# Patient Record
Sex: MALE | Race: WHITE | Employment: OTHER | URBAN - METROPOLITAN AREA
[De-identification: names, ages, dates, MRNs, and addresses within clinical notes are randomized per-mention and may not be internally consistent; named-entity substitution may affect disease eponyms.]

---

## 2017-01-17 RX ORDER — TAMSULOSIN HYDROCHLORIDE 0.4 MG/1
0.4 CAPSULE ORAL DAILY
Qty: 30 CAPSULE | Refills: 3 | Status: SHIPPED | OUTPATIENT
Start: 2017-01-17

## 2017-01-19 RX ORDER — SIMVASTATIN 40 MG
40 TABLET ORAL EVERY EVENING
Qty: 30 TABLET | Refills: 3 | Status: SHIPPED | OUTPATIENT
Start: 2017-01-19

## 2017-03-20 RX ORDER — SILDENAFIL CITRATE 100 MG
TABLET ORAL
Qty: 6 TABLET | Refills: 4 | Status: SHIPPED | OUTPATIENT
Start: 2017-03-20 | End: 2017-09-01 | Stop reason: SDUPTHER

## 2017-09-01 RX ORDER — SILDENAFIL CITRATE 100 MG
TABLET ORAL
Qty: 6 TABLET | Refills: 3 | Status: SHIPPED | OUTPATIENT
Start: 2017-09-01 | End: 2018-05-17 | Stop reason: SDUPTHER

## 2018-05-23 RX ORDER — SILDENAFIL 100 MG/1
TABLET, FILM COATED ORAL
Qty: 6 TABLET | Refills: 0 | Status: SHIPPED | OUTPATIENT
Start: 2018-05-23

## 2021-01-01 ENCOUNTER — HOSPITAL ENCOUNTER (EMERGENCY)
Age: 76
Discharge: HOME | End: 2021-10-27
Attending: EMERGENCY MEDICINE
Payer: MEDICARE

## 2021-01-01 ENCOUNTER — APPOINTMENT (OUTPATIENT)
Dept: GENERAL RADIOLOGY | Age: 76
End: 2021-01-01
Payer: MEDICARE

## 2021-01-01 VITALS
OXYGEN SATURATION: 98 % | SYSTOLIC BLOOD PRESSURE: 99 MMHG | TEMPERATURE: 97.5 F | HEIGHT: 67 IN | DIASTOLIC BLOOD PRESSURE: 56 MMHG | HEART RATE: 106 BPM | BODY MASS INDEX: 25.11 KG/M2 | RESPIRATION RATE: 22 BRPM | WEIGHT: 160 LBS

## 2021-01-01 DIAGNOSIS — E86.1 HYPOTENSION DUE TO HYPOVOLEMIA: Primary | ICD-10-CM

## 2021-01-01 DIAGNOSIS — I95.89 HYPOTENSION DUE TO HYPOVOLEMIA: Primary | ICD-10-CM

## 2021-01-01 LAB
EKG ATRIAL RATE: 105 BPM
EKG Q-T INTERVAL: 362 MS
EKG QRS DURATION: 158 MS
EKG QTC CALCULATION (BAZETT): 487 MS
EKG R AXIS: 268 DEGREES
EKG T AXIS: 90 DEGREES
EKG VENTRICULAR RATE: 109 BPM

## 2021-01-01 PROCEDURE — 93005 ELECTROCARDIOGRAM TRACING: CPT

## 2021-01-01 PROCEDURE — 71045 X-RAY EXAM CHEST 1 VIEW: CPT

## 2021-01-01 PROCEDURE — 99285 EMERGENCY DEPT VISIT HI MDM: CPT

## 2021-01-01 ASSESSMENT — ENCOUNTER SYMPTOMS
CHEST TIGHTNESS: 0
SHORTNESS OF BREATH: 0
NAUSEA: 0
SORE THROAT: 0
VOMITING: 0
ABDOMINAL PAIN: 0
EYE PAIN: 0

## 2021-10-27 NOTE — ED TRIAGE NOTES
Pt was sent to the ER from HD for a syncopal episode and hypotension, Pt states he did not pass out he just had his eyes closed, Pt received a 500ml bolus after his HD treatment, Pt is A&OX3, calm, afebrile, breathes are equal and unlabored, denies any distress at this time.

## 2021-10-27 NOTE — ED PROVIDER NOTES
[address] [facility] ED  EMERGENCY DEPARTMENT ENCOUNTER      Pt Name: Ernesto Camilo  MRN: [id number]  [place] 1945  Date of evaluation: 10/27/2021  Provider: Ryan Mratínez, [address] [place] [address]       Chief Complaint   Patient presents with    Loss of Consciousness     pt sent tot the ER from HD for a syncopal episode and hypotension         HISTORY OF PRESENT ILLNESS   (Location/Symptom, Timing/Onset, Context/Setting, Quality, Duration, Modifying Factors, Severity)  Note limiting factors. Ernesto Camilo is a 68 y.o. male who presents to the emergency department . She came in with hypotension after dialysis. Patient was given 500 cc of fluid en route to the ER and blood pressures improved. Patient is feeling better. Patient states that this has happened many times after dialysis. No chest pain shortness of breath or palpitations. HPI    Nursing Notes were reviewed. REVIEW OF SYSTEMS    (2-9 systems for level 4, 10 or more for level 5)     Review of Systems   Constitutional: Negative for activity change, appetite change and fatigue. HENT: Negative for congestion and sore throat. Eyes: Negative for pain and visual disturbance. Respiratory: Negative for chest tightness and shortness of breath. Cardiovascular: Negative for chest pain. Gastrointestinal: Negative for abdominal pain, nausea and vomiting. Endocrine: Negative for polydipsia. Genitourinary: Negative for flank pain and urgency. Musculoskeletal: Negative for gait problem and neck stiffness. Skin: Negative for rash. Neurological: Positive for light-headedness. Negative for weakness and headaches. Psychiatric/Behavioral: Negative for confusion and sleep disturbance. Except as noted above the remainder of the review of systems was reviewed and negative.        PAST MEDICAL HISTORY     Past Medical History:   Diagnosis Date    BPH (benign prostatic hypertrophy)     CAD (coronary artery disease)     sees  Adia Hung in [place] annually    Cardiomyopathy [facility])     CHF (congestive heart failure) ([facility] [address] [facility]     LVEF 40% on echo 6/2014    COPD (chronic obstructive pulmonary disease) ([facility])     has O2 rarely uses, has nebulizer    Hypertension     On home oxygen therapy     Type II or unspecified type diabetes mellitus without mention of complication, not stated as uncontrolled          SURGICAL HISTORY     History reviewed. No pertinent surgical history. CURRENT MEDICATIONS       Previous Medications    ALISKIREN (TEKTURNA) 300 MG TABLET    Take 300 mg by mouth daily. ASPIRIN 325 MG TABLET    Take 325 mg by mouth daily. ATORVASTATIN (LIPITOR) 20 MG TABLET    Take 20 mg by mouth daily. CARVEDILOL (COREG) 25 MG TABLET    Take 25 mg by mouth 2 times daily (with meals). CLOTRIMAZOLE-BETAMETHASONE (LOTRISONE) CREAM    Apply topically 2 times daily to feet including in between toes for 2 weeks    ENALAPRIL (VASOTEC) 20 MG TABLET    Take 20 mg by mouth daily. FLUTICASONE-SALMETEROL (ADVAIR DISKUS) 250-50 MCG/DOSE AEPB    Inhale 1 puff into the lungs every 12 hours. FUROSEMIDE (LASIX) 40 MG TABLET        METFORMIN (GLUCOPHAGE) 1000 MG TABLET    TAKE 1 TABLET TWICE A DAY WITH MEALS    NITROSTAT 0.4 MG SL TABLET        SILDENAFIL (VIAGRA) 100 MG TABLET    take 1 tablet by mouth as needed for erectile dysfunction    SIMVASTATIN (ZOCOR) 40 MG TABLET    Take 1 tablet by mouth every evening    SPIRIVA HANDIHALER 18 MCG INHALATION CAPSULE        TAMSULOSIN (FLOMAX) 0.4 MG CAPSULE    Take 0.4 mg by mouth daily. TAMSULOSIN (FLOMAX) 0.4 MG CAPSULE    Take 1 capsule by mouth daily    TICAGRELOR (BRILINTA) 90 MG TABS TABLET    Take 1 tablet by mouth 2 times daily    TIOTROPIUM BROMIDE MONOHYDRATE (SPIRIVA HANDIHALER IN)    Inhale  into the lungs. VENTOLIN  (90 BASE) MCG/ACT INHALER           ALLERGIES     Patient has no known allergies.     FAMILY HISTORY       Family History   Problem Relation Age of Onset    Lung Cancer Mother     Seizures Other           SOCIAL HISTORY       Social History     Socioeconomic History    Marital status:      Spouse name: None    Number of children: None    Years of education: None    Highest education level: None   Occupational History    None   Tobacco Use    Smoking status: Current Every Day Smoker     Packs/day: 1.00     Years: 45.00     Pack years: 45.00    Smokeless tobacco: Never Used   Substance and Sexual Activity    Alcohol use: Yes     Comment: OCCASIONAL    Drug use: No    Sexual activity: None   Other Topics Concern    None   Social History Narrative    None     Social Determinants of Health     Financial Resource Strain:     Difficulty of Paying Living Expenses:    Food Insecurity:     Worried About Running Out of Food in the Last Year:     Ran Out of Food in the Last Year:    Transportation Needs:     Lack of Transportation (Medical):      Lack of Transportation (Non-Medical):    Physical Activity:     Days of Exercise per Week:     Minutes of Exercise per Session:    Stress:     Feeling of Stress :    Social Connections:     Frequency of Communication with Friends and Family:     Frequency of Social Gatherings with Friends and Family:     Attends Latter-day Services:     Active Member of Clubs or Organizations:     Attends Club or Organization Meetings:     Marital Status:    Intimate Partner Violence:     Fear of Current or Ex-Partner:     Emotionally Abused:     Physically Abused:     Sexually Abused:        SCREENINGS        [place] Coma Scale  Eye Opening: Spontaneous  Best Verbal Response: Oriented  Best Motor Response: Obeys commands  [place] Coma Scale Score: 15               PHYSICAL EXAM    (up to 7 for level 4, 8 or more for level 5)     ED Triage Vitals   BP Temp Temp Source Pulse Resp SpO2 Height Weight   10/27/21 1120 10/27/21 1120 10/27/21 1120 10/27/21 1120 10/27/21 1120 10/27/21 1134 10/27/21 1120 10/27/21 1120   (!) 91/42 97.5 °F (36.4 °C) Oral 110 18 95 % 5' 7\" (1.702 m) 160 lb (72.6 kg)       Physical Exam  Vitals and nursing note reviewed. Constitutional:       General: He is not in acute distress. Appearance: He is well-developed. He is not diaphoretic. HENT:      Head: Normocephalic and atraumatic. Right Ear: External ear normal.      Left Ear: External ear normal.      Mouth/Throat:      Pharynx: No oropharyngeal exudate. Eyes:      Conjunctiva/sclera: Conjunctivae normal.      Pupils: Pupils are equal, round, and reactive to light. Neck:      Thyroid: No thyromegaly. Vascular: No JVD. Trachea: No tracheal deviation. Cardiovascular:      Rate and Rhythm: Normal rate. Heart sounds: Normal heart sounds. No murmur heard. Pulmonary:      Effort: Pulmonary effort is normal. No respiratory distress. Breath sounds: Normal breath sounds. No wheezing. Abdominal:      General: Bowel sounds are normal.      Palpations: Abdomen is soft. Tenderness: There is no abdominal tenderness. There is no guarding. Musculoskeletal:         General: Normal range of motion. Cervical back: Normal range of motion and neck supple. Skin:     General: Skin is warm and dry. Findings: No rash. Neurological:      General: No focal deficit present. Mental Status: He is alert and oriented to person, place, and time. Cranial Nerves: No cranial nerve deficit. Psychiatric:         Behavior: Behavior normal.         DIAGNOSTIC RESULTS     EKG: All EKG's are interpreted by the Emergency Department Physician who either signs or Co-signs this chart in the absence of a cardiologist.    Atrial fibrillation with  bpm right bundle branch block. Q waves inferiorly.   No acute ischemia    RADIOLOGY:   Non-plain film images such as CT, Ultrasound and MRI are read by the radiologist. Plain radiographic images are visualized and preliminarily interpreted by the Darcy Dancer, DO (electronically signed)  Attending Emergency Physician            Dagoberto Hernandez DO  10/27/21 [id number]

## 2021-10-27 NOTE — ED NOTES
Bed: 04  Expected date: 10/27/21  Expected time:   Means of arrival:   Comments:  68year old male from dialysis  was 60/30 paced at 40  now 94/57 after iv fluids     Blanche Lopez RN  10/27/21 3600

## 2022-01-01 ENCOUNTER — APPOINTMENT (OUTPATIENT)
Dept: ULTRASOUND IMAGING | Age: 77
DRG: 207 | End: 2022-01-01
Payer: MEDICARE

## 2022-01-01 ENCOUNTER — APPOINTMENT (OUTPATIENT)
Dept: CT IMAGING | Age: 77
DRG: 207 | End: 2022-01-01
Payer: MEDICARE

## 2022-01-01 ENCOUNTER — APPOINTMENT (OUTPATIENT)
Dept: GENERAL RADIOLOGY | Age: 77
DRG: 207 | End: 2022-01-01
Payer: MEDICARE

## 2022-01-01 ENCOUNTER — HOSPITAL ENCOUNTER (INPATIENT)
Age: 77
LOS: 6 days | DRG: 207 | End: 2022-01-30
Attending: EMERGENCY MEDICINE | Admitting: INTERNAL MEDICINE
Payer: MEDICARE

## 2022-01-01 ENCOUNTER — APPOINTMENT (OUTPATIENT)
Dept: INTERVENTIONAL RADIOLOGY/VASCULAR | Age: 77
DRG: 207 | End: 2022-01-01
Payer: MEDICARE

## 2022-01-01 VITALS
OXYGEN SATURATION: 97 % | SYSTOLIC BLOOD PRESSURE: 121 MMHG | TEMPERATURE: 96.3 F | BODY MASS INDEX: 27.89 KG/M2 | DIASTOLIC BLOOD PRESSURE: 52 MMHG | WEIGHT: 177.69 LBS | HEART RATE: 60 BPM | HEIGHT: 67 IN

## 2022-01-01 DIAGNOSIS — S09.90XA INJURY OF HEAD, INITIAL ENCOUNTER: ICD-10-CM

## 2022-01-01 DIAGNOSIS — S02.5XXA FRACTURE OF TOOTH (TRAUMATIC), INITIAL ENCOUNTER FOR CLOSED FRACTURE: ICD-10-CM

## 2022-01-01 DIAGNOSIS — R56.9 SEIZURE (HCC): ICD-10-CM

## 2022-01-01 DIAGNOSIS — I46.9 CARDIAC ARREST (HCC): Primary | ICD-10-CM

## 2022-01-01 DIAGNOSIS — N18.5 CHRONIC RENAL FAILURE SYNDROME, STAGE 5 (HCC): ICD-10-CM

## 2022-01-01 DIAGNOSIS — E87.5 HYPERKALEMIA: ICD-10-CM

## 2022-01-01 LAB
ALBUMIN SERPL-MCNC: 2.9 G/DL (ref 3.5–4.6)
ALBUMIN SERPL-MCNC: 2.9 G/DL (ref 3.5–4.6)
ALBUMIN SERPL-MCNC: 3.7 G/DL (ref 3.5–4.6)
ALP BLD-CCNC: 107 U/L (ref 35–104)
ALP BLD-CCNC: 91 U/L (ref 35–104)
ALP BLD-CCNC: 94 U/L (ref 35–104)
ALT SERPL-CCNC: 18 U/L (ref 0–41)
ALT SERPL-CCNC: 21 U/L (ref 0–41)
ALT SERPL-CCNC: 24 U/L (ref 0–41)
ANION GAP SERPL CALCULATED.3IONS-SCNC: 14 MEQ/L (ref 9–15)
ANION GAP SERPL CALCULATED.3IONS-SCNC: 14 MEQ/L (ref 9–15)
ANION GAP SERPL CALCULATED.3IONS-SCNC: 16 MEQ/L (ref 9–15)
ANION GAP SERPL CALCULATED.3IONS-SCNC: 16 MEQ/L (ref 9–15)
ANION GAP SERPL CALCULATED.3IONS-SCNC: 17 MEQ/L (ref 9–15)
ANION GAP SERPL CALCULATED.3IONS-SCNC: 18 MEQ/L (ref 9–15)
ANION GAP SERPL CALCULATED.3IONS-SCNC: 18 MEQ/L (ref 9–15)
ANION GAP SERPL CALCULATED.3IONS-SCNC: 20 MEQ/L (ref 9–15)
ANION GAP SERPL CALCULATED.3IONS-SCNC: 20 MEQ/L (ref 9–15)
ANISOCYTOSIS: ABNORMAL
ANISOCYTOSIS: ABNORMAL
ANTI-XA UNFRAC HEPARIN: 0.11 IU/ML
ANTI-XA UNFRAC HEPARIN: 0.15 IU/ML
ANTI-XA UNFRAC HEPARIN: 0.19 IU/ML
ANTI-XA UNFRAC HEPARIN: 0.22 IU/ML
ANTI-XA UNFRAC HEPARIN: 0.24 IU/ML
ANTI-XA UNFRAC HEPARIN: 0.28 IU/ML
ANTI-XA UNFRAC HEPARIN: 0.3 IU/ML
ANTI-XA UNFRAC HEPARIN: 0.32 IU/ML
ANTI-XA UNFRAC HEPARIN: 0.44 IU/ML
ANTI-XA UNFRAC HEPARIN: 0.56 IU/ML
ANTI-XA UNFRAC HEPARIN: 1.31 IU/ML
ANTI-XA UNFRAC HEPARIN: 1.67 IU/ML
ANTI-XA UNFRAC HEPARIN: >=2 IU/ML
APTT: 45.1 SEC (ref 24.4–36.8)
APTT: >150 SEC (ref 24.4–36.8)
AST SERPL-CCNC: 16 U/L (ref 0–40)
AST SERPL-CCNC: 17 U/L (ref 0–40)
AST SERPL-CCNC: 21 U/L (ref 0–40)
ATYPICAL LYMPHOCYTE RELATIVE PERCENT: 2 %
BASE EXCESS ARTERIAL: -1 (ref -3–3)
BASE EXCESS ARTERIAL: -2 (ref -3–3)
BASE EXCESS ARTERIAL: -6 (ref -3–3)
BASE EXCESS ARTERIAL: 0 (ref -3–3)
BASE EXCESS ARTERIAL: 1 (ref -3–3)
BASE EXCESS ARTERIAL: 1 (ref -3–3)
BASE EXCESS ARTERIAL: 2 (ref -3–3)
BASOPHILS ABSOLUTE: 0 K/UL (ref 0–0.2)
BASOPHILS ABSOLUTE: 0 K/UL (ref 0–0.2)
BASOPHILS ABSOLUTE: 0.1 K/UL (ref 0–0.2)
BASOPHILS ABSOLUTE: 0.1 K/UL (ref 0–0.2)
BASOPHILS RELATIVE PERCENT: 0 %
BASOPHILS RELATIVE PERCENT: 0.2 %
BASOPHILS RELATIVE PERCENT: 0.3 %
BASOPHILS RELATIVE PERCENT: 1 %
BILIRUB SERPL-MCNC: 0.3 MG/DL (ref 0.2–0.7)
BILIRUB SERPL-MCNC: 0.4 MG/DL (ref 0.2–0.7)
BILIRUB SERPL-MCNC: 0.4 MG/DL (ref 0.2–0.7)
BUN BLDV-MCNC: 32 MG/DL (ref 8–23)
BUN BLDV-MCNC: 39 MG/DL (ref 8–23)
BUN BLDV-MCNC: 42 MG/DL (ref 8–23)
BUN BLDV-MCNC: 43 MG/DL (ref 8–23)
BUN BLDV-MCNC: 46 MG/DL (ref 8–23)
BUN BLDV-MCNC: 49 MG/DL (ref 8–23)
BUN BLDV-MCNC: 52 MG/DL (ref 8–23)
BUN BLDV-MCNC: 55 MG/DL (ref 8–23)
BUN BLDV-MCNC: 59 MG/DL (ref 8–23)
CALCIUM IONIZED, CALC AT PH 7.4: 0.92 MMOL/L (ref 1.11–1.3)
CALCIUM IONIZED, CALC AT PH 7.4: 1.08 MMOL/L (ref 1.11–1.3)
CALCIUM IONIZED, CALC AT PH 7.4: 1.12 MMOL/L (ref 1.11–1.3)
CALCIUM IONIZED, CALC AT PH 7.4: 1.16 MMOL/L (ref 1.11–1.3)
CALCIUM IONIZED, CALC AT PH 7.4: 1.17 MMOL/L (ref 1.11–1.3)
CALCIUM IONIZED: 0.92 MMOL/L (ref 1.11–1.3)
CALCIUM IONIZED: 1 MMOL/L (ref 1.12–1.32)
CALCIUM IONIZED: 1.05 MMOL/L (ref 1.12–1.32)
CALCIUM IONIZED: 1.05 MMOL/L (ref 1.12–1.32)
CALCIUM IONIZED: 1.06 MMOL/L (ref 1.12–1.32)
CALCIUM IONIZED: 1.06 MMOL/L (ref 1.12–1.32)
CALCIUM IONIZED: 1.08 MMOL/L (ref 1.11–1.3)
CALCIUM IONIZED: 1.09 MMOL/L (ref 1.11–1.3)
CALCIUM IONIZED: 1.09 MMOL/L (ref 1.12–1.32)
CALCIUM IONIZED: 1.1 MMOL/L (ref 1.11–1.3)
CALCIUM IONIZED: 1.1 MMOL/L (ref 1.12–1.32)
CALCIUM IONIZED: 1.13 MMOL/L (ref 1.11–1.3)
CALCIUM SERPL-MCNC: 8.1 MG/DL (ref 8.5–9.9)
CALCIUM SERPL-MCNC: 8.4 MG/DL (ref 8.5–9.9)
CALCIUM SERPL-MCNC: 8.5 MG/DL (ref 8.5–9.9)
CALCIUM SERPL-MCNC: 8.5 MG/DL (ref 8.5–9.9)
CALCIUM SERPL-MCNC: 8.7 MG/DL (ref 8.5–9.9)
CALCIUM SERPL-MCNC: 8.8 MG/DL (ref 8.5–9.9)
CHLORIDE BLD-SCNC: 85 MEQ/L (ref 95–107)
CHLORIDE BLD-SCNC: 87 MEQ/L (ref 95–107)
CHLORIDE BLD-SCNC: 89 MEQ/L (ref 95–107)
CHLORIDE BLD-SCNC: 89 MEQ/L (ref 95–107)
CHLORIDE BLD-SCNC: 90 MEQ/L (ref 95–107)
CHLORIDE BLD-SCNC: 93 MEQ/L (ref 95–107)
CHLORIDE BLD-SCNC: 94 MEQ/L (ref 95–107)
CK MB: 21.4 NG/ML (ref 0–6.7)
CK MB: 36.9 NG/ML (ref 0–6.7)
CO2: 20 MEQ/L (ref 20–31)
CO2: 22 MEQ/L (ref 20–31)
CO2: 23 MEQ/L (ref 20–31)
CO2: 23 MEQ/L (ref 20–31)
CO2: 24 MEQ/L (ref 20–31)
CO2: 25 MEQ/L (ref 20–31)
CO2: 28 MEQ/L (ref 20–31)
CREAT SERPL-MCNC: 4.91 MG/DL (ref 0.7–1.2)
CREAT SERPL-MCNC: 5.01 MG/DL (ref 0.7–1.2)
CREAT SERPL-MCNC: 5.47 MG/DL (ref 0.7–1.2)
CREAT SERPL-MCNC: 6.04 MG/DL (ref 0.7–1.2)
CREAT SERPL-MCNC: 6.26 MG/DL (ref 0.7–1.2)
CREAT SERPL-MCNC: 6.63 MG/DL (ref 0.7–1.2)
CREAT SERPL-MCNC: 6.8 MG/DL (ref 0.7–1.2)
CREAT SERPL-MCNC: 7.03 MG/DL (ref 0.7–1.2)
CREAT SERPL-MCNC: 8.62 MG/DL (ref 0.7–1.2)
CREATINE KINASE-MB INDEX: 7.9 % (ref 0–3.5)
CREATINE KINASE-MB INDEX: 8.7 % (ref 0–3.5)
CULTURE, BLOOD 2: NORMAL
CULTURE, RESPIRATORY: ABNORMAL
CULTURE, RESPIRATORY: ABNORMAL
D DIMER: >20 MG/L FEU (ref 0–0.5)
EKG ATRIAL RATE: 35 BPM
EKG ATRIAL RATE: 69 BPM
EKG ATRIAL RATE: 70 BPM
EKG ATRIAL RATE: 96 BPM
EKG P AXIS: 24 DEGREES
EKG P-R INTERVAL: 216 MS
EKG Q-T INTERVAL: 294 MS
EKG Q-T INTERVAL: 390 MS
EKG Q-T INTERVAL: 488 MS
EKG Q-T INTERVAL: 516 MS
EKG QRS DURATION: 146 MS
EKG QRS DURATION: 166 MS
EKG QRS DURATION: 182 MS
EKG QRS DURATION: 184 MS
EKG QTC CALCULATION (BAZETT): 294 MS
EKG QTC CALCULATION (BAZETT): 492 MS
EKG QTC CALCULATION (BAZETT): 503 MS
EKG QTC CALCULATION (BAZETT): 588 MS
EKG R AXIS: 154 DEGREES
EKG R AXIS: 186 DEGREES
EKG R AXIS: 196 DEGREES
EKG R AXIS: 256 DEGREES
EKG T AXIS: -46 DEGREES
EKG T AXIS: -63 DEGREES
EKG T AXIS: 31 DEGREES
EKG T AXIS: 78 DEGREES
EKG VENTRICULAR RATE: 60 BPM
EKG VENTRICULAR RATE: 64 BPM
EKG VENTRICULAR RATE: 78 BPM
EKG VENTRICULAR RATE: 96 BPM
EOSINOPHILS ABSOLUTE: 0 K/UL (ref 0–0.7)
EOSINOPHILS ABSOLUTE: 0.1 K/UL (ref 0–0.7)
EOSINOPHILS ABSOLUTE: 0.2 K/UL (ref 0–0.7)
EOSINOPHILS ABSOLUTE: 0.3 K/UL (ref 0–0.7)
EOSINOPHILS RELATIVE PERCENT: 0.2 %
EOSINOPHILS RELATIVE PERCENT: 0.5 %
EOSINOPHILS RELATIVE PERCENT: 1 %
EOSINOPHILS RELATIVE PERCENT: 2 %
GFR AFRICAN AMERICAN: 10
GFR AFRICAN AMERICAN: 10.6
GFR AFRICAN AMERICAN: 11
GFR AFRICAN AMERICAN: 11
GFR AFRICAN AMERICAN: 12.4
GFR AFRICAN AMERICAN: 13
GFR AFRICAN AMERICAN: 13
GFR AFRICAN AMERICAN: 13.7
GFR AFRICAN AMERICAN: 14
GFR AFRICAN AMERICAN: 7
GFR AFRICAN AMERICAN: 7.3
GFR AFRICAN AMERICAN: 9
GFR AFRICAN AMERICAN: 9
GFR AFRICAN AMERICAN: 9.3
GFR AFRICAN AMERICAN: 9.6
GFR AFRICAN AMERICAN: 9.9
GFR NON-AFRICAN AMERICAN: 10.2
GFR NON-AFRICAN AMERICAN: 11
GFR NON-AFRICAN AMERICAN: 11
GFR NON-AFRICAN AMERICAN: 11.3
GFR NON-AFRICAN AMERICAN: 11.6
GFR NON-AFRICAN AMERICAN: 6
GFR NON-AFRICAN AMERICAN: 6
GFR NON-AFRICAN AMERICAN: 7
GFR NON-AFRICAN AMERICAN: 7.6
GFR NON-AFRICAN AMERICAN: 7.9
GFR NON-AFRICAN AMERICAN: 8
GFR NON-AFRICAN AMERICAN: 8.2
GFR NON-AFRICAN AMERICAN: 8.7
GFR NON-AFRICAN AMERICAN: 9
GFR NON-AFRICAN AMERICAN: 9
GFR NON-AFRICAN AMERICAN: 9.1
GLOBULIN: 2.6 G/DL (ref 2.3–3.5)
GLOBULIN: 2.7 G/DL (ref 2.3–3.5)
GLOBULIN: 3.1 G/DL (ref 2.3–3.5)
GLUCOSE BLD-MCNC: 100 MG/DL (ref 70–99)
GLUCOSE BLD-MCNC: 115 MG/DL (ref 70–99)
GLUCOSE BLD-MCNC: 118 MG/DL (ref 70–99)
GLUCOSE BLD-MCNC: 125 MG/DL (ref 70–99)
GLUCOSE BLD-MCNC: 125 MG/DL (ref 70–99)
GLUCOSE BLD-MCNC: 128 MG/DL (ref 70–99)
GLUCOSE BLD-MCNC: 129 MG/DL (ref 70–99)
GLUCOSE BLD-MCNC: 130 MG/DL (ref 70–99)
GLUCOSE BLD-MCNC: 135 MG/DL (ref 70–99)
GLUCOSE BLD-MCNC: 135 MG/DL (ref 70–99)
GLUCOSE BLD-MCNC: 136 MG/DL (ref 70–99)
GLUCOSE BLD-MCNC: 138 MG/DL (ref 70–99)
GLUCOSE BLD-MCNC: 139 MG/DL (ref 70–99)
GLUCOSE BLD-MCNC: 139 MG/DL (ref 70–99)
GLUCOSE BLD-MCNC: 140 MG/DL (ref 70–99)
GLUCOSE BLD-MCNC: 140 MG/DL (ref 70–99)
GLUCOSE BLD-MCNC: 141 MG/DL (ref 70–99)
GLUCOSE BLD-MCNC: 142 MG/DL (ref 70–99)
GLUCOSE BLD-MCNC: 146 MG/DL (ref 70–99)
GLUCOSE BLD-MCNC: 146 MG/DL (ref 70–99)
GLUCOSE BLD-MCNC: 149 MG/DL (ref 70–99)
GLUCOSE BLD-MCNC: 149 MG/DL (ref 70–99)
GLUCOSE BLD-MCNC: 151 MG/DL (ref 70–99)
GLUCOSE BLD-MCNC: 151 MG/DL (ref 70–99)
GLUCOSE BLD-MCNC: 152 MG/DL (ref 70–99)
GLUCOSE BLD-MCNC: 158 MG/DL (ref 70–99)
GLUCOSE BLD-MCNC: 159 MG/DL (ref 70–99)
GLUCOSE BLD-MCNC: 161 MG/DL (ref 70–99)
GLUCOSE BLD-MCNC: 162 MG/DL (ref 70–99)
GLUCOSE BLD-MCNC: 163 MG/DL (ref 70–99)
GLUCOSE BLD-MCNC: 165 MG/DL (ref 70–99)
GLUCOSE BLD-MCNC: 169 MG/DL (ref 70–99)
GLUCOSE BLD-MCNC: 169 MG/DL (ref 70–99)
GLUCOSE BLD-MCNC: 170 MG/DL (ref 70–99)
GLUCOSE BLD-MCNC: 170 MG/DL (ref 70–99)
GLUCOSE BLD-MCNC: 172 MG/DL (ref 70–99)
GLUCOSE BLD-MCNC: 182 MG/DL (ref 70–99)
GLUCOSE BLD-MCNC: 185 MG/DL (ref 70–99)
GLUCOSE BLD-MCNC: 188 MG/DL (ref 70–99)
GLUCOSE BLD-MCNC: 193 MG/DL (ref 70–99)
GRAM STAIN RESULT: ABNORMAL
HBV SURFACE AB TITR SER: <3.5 MIU/ML
HCO3 ARTERIAL: 23 MMOL/L (ref 21–29)
HCO3 ARTERIAL: 24 MMOL/L (ref 21–29)
HCO3 ARTERIAL: 24.1 MMOL/L (ref 21–29)
HCO3 ARTERIAL: 24.7 MMOL/L (ref 21–29)
HCO3 ARTERIAL: 25.5 MMOL/L (ref 21–29)
HCO3 ARTERIAL: 26.2 MMOL/L (ref 21–29)
HCO3 ARTERIAL: 27.3 MMOL/L (ref 21–29)
HCT VFR BLD CALC: 25.6 % (ref 42–52)
HCT VFR BLD CALC: 25.8 % (ref 42–52)
HCT VFR BLD CALC: 26.4 % (ref 42–52)
HCT VFR BLD CALC: 29.4 % (ref 42–52)
HCT VFR BLD CALC: 30.7 % (ref 42–52)
HEMOGLOBIN: 10.1 GM/DL (ref 13.5–17.5)
HEMOGLOBIN: 8.5 G/DL (ref 14–18)
HEMOGLOBIN: 8.5 GM/DL (ref 13.5–17.5)
HEMOGLOBIN: 8.6 G/DL (ref 14–18)
HEMOGLOBIN: 8.6 G/DL (ref 14–18)
HEMOGLOBIN: 9.2 GM/DL (ref 13.5–17.5)
HEMOGLOBIN: 9.3 G/DL (ref 14–18)
HEMOGLOBIN: 9.4 G/DL (ref 14–18)
HEMOGLOBIN: 9.4 GM/DL (ref 13.5–17.5)
HEMOGLOBIN: 9.4 GM/DL (ref 13.5–17.5)
HEMOGLOBIN: 9.5 GM/DL (ref 13.5–17.5)
HEMOGLOBIN: 9.7 GM/DL (ref 13.5–17.5)
HEPATITIS B SURFACE ANTIGEN INTERPRETATION: NORMAL
HYPERCHROMASIA: ABNORMAL
INR BLD: 1.4
INR BLD: 1.5
INR BLD: 1.9
LACTATE: 0.56 MMOL/L (ref 0.4–2)
LACTATE: 1.1 MMOL/L (ref 0.4–2)
LACTATE: 1.25 MMOL/L (ref 0.4–2)
LACTATE: 2.62 MMOL/L (ref 0.4–2)
LACTATE: 4.32 MMOL/L (ref 0.4–2)
LACTIC ACID: 1.2 MMOL/L (ref 0.5–2.2)
LACTIC ACID: 1.4 MMOL/L (ref 0.5–2.2)
LACTIC ACID: 1.4 MMOL/L (ref 0.5–2.2)
LACTIC ACID: 1.7 MMOL/L (ref 0.5–2.2)
LACTIC ACID: 3 MMOL/L (ref 0.5–2.2)
LV EF: 15 %
LVEF MODALITY: NORMAL
LYMPHOCYTES ABSOLUTE: 0.6 K/UL (ref 1–4.8)
LYMPHOCYTES ABSOLUTE: 1.1 K/UL (ref 1–4.8)
LYMPHOCYTES ABSOLUTE: 1.9 K/UL (ref 1–4.8)
LYMPHOCYTES ABSOLUTE: 2.8 K/UL (ref 1–4.8)
LYMPHOCYTES RELATIVE PERCENT: 18 %
LYMPHOCYTES RELATIVE PERCENT: 2.5 %
LYMPHOCYTES RELATIVE PERCENT: 7 %
LYMPHOCYTES RELATIVE PERCENT: 9 %
MAGNESIUM: 2.2 MG/DL (ref 1.7–2.4)
MAGNESIUM: 2.3 MG/DL (ref 1.7–2.4)
MAGNESIUM: 2.3 MG/DL (ref 1.7–2.4)
MAGNESIUM: 2.4 MG/DL (ref 1.7–2.4)
MAGNESIUM: 3.4 MG/DL (ref 1.7–2.4)
MCH RBC QN AUTO: 31.1 PG (ref 27–31.3)
MCH RBC QN AUTO: 31.6 PG (ref 27–31.3)
MCH RBC QN AUTO: 32 PG (ref 27–31.3)
MCH RBC QN AUTO: 32 PG (ref 27–31.3)
MCH RBC QN AUTO: 32.6 PG (ref 27–31.3)
MCHC RBC AUTO-ENTMCNC: 30.8 % (ref 33–37)
MCHC RBC AUTO-ENTMCNC: 31.5 % (ref 33–37)
MCHC RBC AUTO-ENTMCNC: 32.6 % (ref 33–37)
MCHC RBC AUTO-ENTMCNC: 33.1 % (ref 33–37)
MCHC RBC AUTO-ENTMCNC: 33.7 % (ref 33–37)
MCV RBC AUTO: 102.8 FL (ref 80–100)
MCV RBC AUTO: 96.6 FL (ref 80–100)
MCV RBC AUTO: 96.8 FL (ref 80–100)
MCV RBC AUTO: 98 FL (ref 80–100)
MCV RBC AUTO: 98.7 FL (ref 80–100)
MONOCYTES ABSOLUTE: 0.6 K/UL (ref 0.2–0.8)
MONOCYTES ABSOLUTE: 0.7 K/UL (ref 0.2–0.8)
MONOCYTES ABSOLUTE: 0.8 K/UL (ref 0.2–0.8)
MONOCYTES ABSOLUTE: 0.8 K/UL (ref 0.2–0.8)
MONOCYTES RELATIVE PERCENT: 3.1 %
MONOCYTES RELATIVE PERCENT: 3.7 %
MONOCYTES RELATIVE PERCENT: 4 %
MONOCYTES RELATIVE PERCENT: 5.8 %
NEUTROPHILS ABSOLUTE: 10 K/UL (ref 1.4–6.5)
NEUTROPHILS ABSOLUTE: 14.2 K/UL (ref 1.4–6.5)
NEUTROPHILS ABSOLUTE: 18.3 K/UL (ref 1.4–6.5)
NEUTROPHILS ABSOLUTE: 22.3 K/UL (ref 1.4–6.5)
NEUTROPHILS RELATIVE PERCENT: 72 %
NEUTROPHILS RELATIVE PERCENT: 86.6 %
NEUTROPHILS RELATIVE PERCENT: 88 %
NEUTROPHILS RELATIVE PERCENT: 93.9 %
O2 SAT, ARTERIAL: 100 % (ref 93–100)
O2 SAT, ARTERIAL: 94 % (ref 93–100)
O2 SAT, ARTERIAL: 95 % (ref 93–100)
O2 SAT, ARTERIAL: 95 % (ref 93–100)
O2 SAT, ARTERIAL: 97 % (ref 93–100)
ORGANISM: ABNORMAL
OVALOCYTES: ABNORMAL
PCO2 ARTERIAL: 38 MM HG (ref 35–45)
PCO2 ARTERIAL: 40 MM HG (ref 35–45)
PCO2 ARTERIAL: 41 MM HG (ref 35–45)
PCO2 ARTERIAL: 42 MM HG (ref 35–45)
PCO2 ARTERIAL: 44 MM HG (ref 35–45)
PCO2 ARTERIAL: 51 MM HG (ref 35–45)
PCO2 ARTERIAL: 66 MM HG (ref 35–45)
PDW BLD-RTO: 17.1 % (ref 11.5–14.5)
PDW BLD-RTO: 17.3 % (ref 11.5–14.5)
PDW BLD-RTO: 17.4 % (ref 11.5–14.5)
PDW BLD-RTO: 17.4 % (ref 11.5–14.5)
PDW BLD-RTO: 17.5 % (ref 11.5–14.5)
PERFORMED ON: ABNORMAL
PH ARTERIAL: 7.15 (ref 7.35–7.45)
PH ARTERIAL: 7.34 (ref 7.35–7.45)
PH ARTERIAL: 7.35 (ref 7.35–7.45)
PH ARTERIAL: 7.37 (ref 7.35–7.45)
PH ARTERIAL: 7.41 (ref 7.35–7.45)
PH ARTERIAL: 7.42 (ref 7.35–7.45)
PH ARTERIAL: 7.42 (ref 7.35–7.45)
PHOSPHORUS: 4.5 MG/DL (ref 2.3–4.8)
PHOSPHORUS: 4.6 MG/DL (ref 2.3–4.8)
PHOSPHORUS: 4.7 MG/DL (ref 2.3–4.8)
PHOSPHORUS: 4.9 MG/DL (ref 2.3–4.8)
PHOSPHORUS: 5 MG/DL (ref 2.3–4.8)
PHOSPHORUS: 5.5 MG/DL (ref 2.3–4.8)
PLATELET # BLD: 140 K/UL (ref 130–400)
PLATELET # BLD: 151 K/UL (ref 130–400)
PLATELET # BLD: 172 K/UL (ref 130–400)
PLATELET # BLD: 173 K/UL (ref 130–400)
PLATELET # BLD: 173 K/UL (ref 130–400)
PLATELET SLIDE REVIEW: ADEQUATE
PLATELET SLIDE REVIEW: ADEQUATE
PO2 ARTERIAL: 413 MM HG (ref 75–108)
PO2 ARTERIAL: 69 MM HG (ref 75–108)
PO2 ARTERIAL: 75 MM HG (ref 75–108)
PO2 ARTERIAL: 75 MM HG (ref 75–108)
PO2 ARTERIAL: 77 MM HG (ref 75–108)
PO2 ARTERIAL: 77 MM HG (ref 75–108)
PO2 ARTERIAL: 90 MM HG (ref 75–108)
POC CHLORIDE: 101 MEQ/L (ref 99–110)
POC CHLORIDE: 92 MEQ/L (ref 99–110)
POC CHLORIDE: 92 MEQ/L (ref 99–110)
POC CHLORIDE: 94 MEQ/L (ref 99–110)
POC CHLORIDE: 96 MEQ/L (ref 99–110)
POC CREATININE: 5.3 MG/DL (ref 0.8–1.3)
POC CREATININE: 5.3 MG/DL (ref 0.8–1.3)
POC CREATININE: 6.2 MG/DL (ref 0.8–1.3)
POC CREATININE: 6.3 MG/DL (ref 0.8–1.3)
POC CREATININE: 6.9 MG/DL (ref 0.8–1.3)
POC CREATININE: 7.2 MG/DL (ref 0.8–1.3)
POC CREATININE: 8.5 MG/DL (ref 0.8–1.3)
POC FIO2: 40
POC FIO2: 50
POC FIO2: 60
POC FIO2: 60
POC FIO2: 90
POC HEMATOCRIT: 25 % (ref 41–53)
POC HEMATOCRIT: 27 % (ref 41–53)
POC HEMATOCRIT: 28 % (ref 41–53)
POC HEMATOCRIT: 29 % (ref 41–53)
POC HEMATOCRIT: 30 % (ref 41–53)
POC POTASSIUM: 4.5 MEQ/L (ref 3.5–5.1)
POC POTASSIUM: 4.5 MEQ/L (ref 3.5–5.1)
POC POTASSIUM: 5.2 MEQ/L (ref 3.5–5.1)
POC POTASSIUM: 5.3 MEQ/L (ref 3.5–5.1)
POC POTASSIUM: 5.3 MEQ/L (ref 3.5–5.1)
POC POTASSIUM: 5.6 MEQ/L (ref 3.5–5.1)
POC POTASSIUM: 7.1 MEQ/L (ref 3.5–5.1)
POC SAMPLE TYPE: ABNORMAL
POC SODIUM: 123 MEQ/L (ref 136–145)
POC SODIUM: 126 MEQ/L (ref 136–145)
POC SODIUM: 127 MEQ/L (ref 136–145)
POC SODIUM: 128 MEQ/L (ref 136–145)
POC SODIUM: 128 MEQ/L (ref 136–145)
POC SODIUM: 132 MEQ/L (ref 136–145)
POC SODIUM: 134 MEQ/L (ref 136–145)
POIKILOCYTES: ABNORMAL
POTASSIUM SERPL-SCNC: 4.2 MEQ/L (ref 3.4–4.9)
POTASSIUM SERPL-SCNC: 4.5 MEQ/L (ref 3.4–4.9)
POTASSIUM SERPL-SCNC: 4.7 MEQ/L (ref 3.4–4.9)
POTASSIUM SERPL-SCNC: 5.4 MEQ/L (ref 3.4–4.9)
POTASSIUM SERPL-SCNC: 5.5 MEQ/L (ref 3.4–4.9)
POTASSIUM SERPL-SCNC: 5.6 MEQ/L (ref 3.4–4.9)
POTASSIUM SERPL-SCNC: 5.6 MEQ/L (ref 3.4–4.9)
POTASSIUM SERPL-SCNC: 5.7 MEQ/L (ref 3.4–4.9)
POTASSIUM SERPL-SCNC: 6.6 MEQ/L (ref 3.4–4.9)
PROTHROMBIN TIME: 16.9 SEC (ref 12.3–14.9)
PROTHROMBIN TIME: 17.9 SEC (ref 12.3–14.9)
PROTHROMBIN TIME: 21.6 SEC (ref 12.3–14.9)
RBC # BLD: 2.64 M/UL (ref 4.7–6.1)
RBC # BLD: 2.67 M/UL (ref 4.7–6.1)
RBC # BLD: 2.7 M/UL (ref 4.7–6.1)
RBC # BLD: 2.98 M/UL (ref 4.7–6.1)
RBC # BLD: 2.98 M/UL (ref 4.7–6.1)
REASON FOR REJECTION: NORMAL
REJECTED TEST: NORMAL
SARS-COV-2, NAAT: DETECTED
SMUDGE CELLS: 1
SODIUM BLD-SCNC: 125 MEQ/L (ref 135–144)
SODIUM BLD-SCNC: 127 MEQ/L (ref 135–144)
SODIUM BLD-SCNC: 128 MEQ/L (ref 135–144)
SODIUM BLD-SCNC: 128 MEQ/L (ref 135–144)
SODIUM BLD-SCNC: 129 MEQ/L (ref 135–144)
SODIUM BLD-SCNC: 131 MEQ/L (ref 135–144)
SODIUM BLD-SCNC: 132 MEQ/L (ref 135–144)
SODIUM BLD-SCNC: 134 MEQ/L (ref 135–144)
SODIUM BLD-SCNC: 135 MEQ/L (ref 135–144)
TCO2 ARTERIAL: 25 MMOL/L (ref 21–32)
TCO2 ARTERIAL: 26 MMOL/L (ref 21–32)
TCO2 ARTERIAL: 27 MMOL/L (ref 21–32)
TCO2 ARTERIAL: 28 MMOL/L (ref 21–32)
TCO2 ARTERIAL: 29 MMOL/L (ref 21–32)
TOTAL CK: 105 U/L (ref 0–190)
TOTAL CK: 108 U/L (ref 0–190)
TOTAL CK: 116 U/L (ref 0–190)
TOTAL CK: 141 U/L (ref 0–190)
TOTAL CK: 245 U/L (ref 0–190)
TOTAL CK: 467 U/L (ref 0–190)
TOTAL PROTEIN: 5.6 G/DL (ref 6.3–8)
TOTAL PROTEIN: 6 G/DL (ref 6.3–8)
TOTAL PROTEIN: 6.3 G/DL (ref 6.3–8)
TROPONIN: 0.13 NG/ML (ref 0–0.01)
TROPONIN: 0.2 NG/ML (ref 0–0.01)
TROPONIN: 0.31 NG/ML (ref 0–0.01)
TROPONIN: 0.32 NG/ML (ref 0–0.01)
TROPONIN: 0.32 NG/ML (ref 0–0.01)
VANCOMYCIN RANDOM: 11.3 UG/ML (ref 10–40)
VANCOMYCIN RANDOM: 18.1 UG/ML (ref 10–40)
WBC # BLD: 13.9 K/UL (ref 4.8–10.8)
WBC # BLD: 15.8 K/UL (ref 4.8–10.8)
WBC # BLD: 16.1 K/UL (ref 4.8–10.8)
WBC # BLD: 21.2 K/UL (ref 4.8–10.8)
WBC # BLD: 23.7 K/UL (ref 4.8–10.8)

## 2022-01-01 PROCEDURE — 2709999900 IR PICC WO SQ PORT/PUMP > 5 YEARS

## 2022-01-01 PROCEDURE — 85610 PROTHROMBIN TIME: CPT

## 2022-01-01 PROCEDURE — 99291 CRITICAL CARE FIRST HOUR: CPT

## 2022-01-01 PROCEDURE — 99233 SBSQ HOSP IP/OBS HIGH 50: CPT | Performed by: INTERNAL MEDICINE

## 2022-01-01 PROCEDURE — 6360000002 HC RX W HCPCS: Performed by: NURSE PRACTITIONER

## 2022-01-01 PROCEDURE — 2580000003 HC RX 258: Performed by: INTERNAL MEDICINE

## 2022-01-01 PROCEDURE — 6370000000 HC RX 637 (ALT 250 FOR IP): Performed by: NURSE PRACTITIONER

## 2022-01-01 PROCEDURE — 2580000003 HC RX 258

## 2022-01-01 PROCEDURE — 5A1D70Z PERFORMANCE OF URINARY FILTRATION, INTERMITTENT, LESS THAN 6 HOURS PER DAY: ICD-10-PCS | Performed by: INTERNAL MEDICINE

## 2022-01-01 PROCEDURE — 2580000003 HC RX 258: Performed by: NURSE PRACTITIONER

## 2022-01-01 PROCEDURE — 80202 ASSAY OF VANCOMYCIN: CPT

## 2022-01-01 PROCEDURE — 2000000000 HC ICU R&B

## 2022-01-01 PROCEDURE — 87040 BLOOD CULTURE FOR BACTERIA: CPT

## 2022-01-01 PROCEDURE — 84132 ASSAY OF SERUM POTASSIUM: CPT

## 2022-01-01 PROCEDURE — 82553 CREATINE MB FRACTION: CPT

## 2022-01-01 PROCEDURE — 6360000002 HC RX W HCPCS: Performed by: INTERNAL MEDICINE

## 2022-01-01 PROCEDURE — 36600 WITHDRAWAL OF ARTERIAL BLOOD: CPT

## 2022-01-01 PROCEDURE — 2500000003 HC RX 250 WO HCPCS: Performed by: STUDENT IN AN ORGANIZED HEALTH CARE EDUCATION/TRAINING PROGRAM

## 2022-01-01 PROCEDURE — 90935 HEMODIALYSIS ONE EVALUATION: CPT

## 2022-01-01 PROCEDURE — 82435 ASSAY OF BLOOD CHLORIDE: CPT

## 2022-01-01 PROCEDURE — C9254 INJECTION, LACOSAMIDE: HCPCS

## 2022-01-01 PROCEDURE — 93005 ELECTROCARDIOGRAM TRACING: CPT | Performed by: EMERGENCY MEDICINE

## 2022-01-01 PROCEDURE — 36556 INSERT NON-TUNNEL CV CATH: CPT

## 2022-01-01 PROCEDURE — 93325 DOPPLER ECHO COLOR FLOW MAPG: CPT

## 2022-01-01 PROCEDURE — 71045 X-RAY EXAM CHEST 1 VIEW: CPT

## 2022-01-01 PROCEDURE — 70450 CT HEAD/BRAIN W/O DYE: CPT

## 2022-01-01 PROCEDURE — 84100 ASSAY OF PHOSPHORUS: CPT

## 2022-01-01 PROCEDURE — 85025 COMPLETE CBC W/AUTO DIFF WBC: CPT

## 2022-01-01 PROCEDURE — 99285 EMERGENCY DEPT VISIT HI MDM: CPT

## 2022-01-01 PROCEDURE — 36573 INSJ PICC RS&I 5 YR+: CPT

## 2022-01-01 PROCEDURE — 83605 ASSAY OF LACTIC ACID: CPT

## 2022-01-01 PROCEDURE — A4216 STERILE WATER/SALINE, 10 ML: HCPCS | Performed by: NURSE PRACTITIONER

## 2022-01-01 PROCEDURE — 82803 BLOOD GASES ANY COMBINATION: CPT

## 2022-01-01 PROCEDURE — 85520 HEPARIN ASSAY: CPT

## 2022-01-01 PROCEDURE — 82330 ASSAY OF CALCIUM: CPT

## 2022-01-01 PROCEDURE — 82550 ASSAY OF CK (CPK): CPT

## 2022-01-01 PROCEDURE — 6370000000 HC RX 637 (ALT 250 FOR IP): Performed by: INTERNAL MEDICINE

## 2022-01-01 PROCEDURE — 37799 UNLISTED PX VASCULAR SURGERY: CPT

## 2022-01-01 PROCEDURE — 93005 ELECTROCARDIOGRAM TRACING: CPT | Performed by: INTERNAL MEDICINE

## 2022-01-01 PROCEDURE — 93010 ELECTROCARDIOGRAM REPORT: CPT | Performed by: INTERNAL MEDICINE

## 2022-01-01 PROCEDURE — 80048 BASIC METABOLIC PNL TOTAL CA: CPT

## 2022-01-01 PROCEDURE — 6360000002 HC RX W HCPCS

## 2022-01-01 PROCEDURE — 94003 VENT MGMT INPAT SUBQ DAY: CPT

## 2022-01-01 PROCEDURE — 99291 CRITICAL CARE FIRST HOUR: CPT | Performed by: INTERNAL MEDICINE

## 2022-01-01 PROCEDURE — 2580000003 HC RX 258: Performed by: EMERGENCY MEDICINE

## 2022-01-01 PROCEDURE — 2500000003 HC RX 250 WO HCPCS: Performed by: EMERGENCY MEDICINE

## 2022-01-01 PROCEDURE — 36620 INSERTION CATHETER ARTERY: CPT | Performed by: INTERNAL MEDICINE

## 2022-01-01 PROCEDURE — 93971 EXTREMITY STUDY: CPT

## 2022-01-01 PROCEDURE — 83735 ASSAY OF MAGNESIUM: CPT

## 2022-01-01 PROCEDURE — 82565 ASSAY OF CREATININE: CPT

## 2022-01-01 PROCEDURE — 99233 SBSQ HOSP IP/OBS HIGH 50: CPT | Performed by: NURSE PRACTITIONER

## 2022-01-01 PROCEDURE — 72125 CT NECK SPINE W/O DYE: CPT

## 2022-01-01 PROCEDURE — 84295 ASSAY OF SERUM SODIUM: CPT

## 2022-01-01 PROCEDURE — 36592 COLLECT BLOOD FROM PICC: CPT

## 2022-01-01 PROCEDURE — 36415 COLL VENOUS BLD VENIPUNCTURE: CPT

## 2022-01-01 PROCEDURE — C9113 INJ PANTOPRAZOLE SODIUM, VIA: HCPCS | Performed by: NURSE PRACTITIONER

## 2022-01-01 PROCEDURE — 6370000000 HC RX 637 (ALT 250 FOR IP): Performed by: EMERGENCY MEDICINE

## 2022-01-01 PROCEDURE — 85014 HEMATOCRIT: CPT

## 2022-01-01 PROCEDURE — 87340 HEPATITIS B SURFACE AG IA: CPT

## 2022-01-01 PROCEDURE — 84484 ASSAY OF TROPONIN QUANT: CPT

## 2022-01-01 PROCEDURE — 02HV33Z INSERTION OF INFUSION DEVICE INTO SUPERIOR VENA CAVA, PERCUTANEOUS APPROACH: ICD-10-PCS | Performed by: RADIOLOGY

## 2022-01-01 PROCEDURE — 06HY33Z INSERTION OF INFUSION DEVICE INTO LOWER VEIN, PERCUTANEOUS APPROACH: ICD-10-PCS | Performed by: EMERGENCY MEDICINE

## 2022-01-01 PROCEDURE — 80053 COMPREHEN METABOLIC PANEL: CPT

## 2022-01-01 PROCEDURE — 2700000000 HC OXYGEN THERAPY PER DAY

## 2022-01-01 PROCEDURE — 31500 INSERT EMERGENCY AIRWAY: CPT

## 2022-01-01 PROCEDURE — 85379 FIBRIN DEGRADATION QUANT: CPT

## 2022-01-01 PROCEDURE — 85027 COMPLETE CBC AUTOMATED: CPT

## 2022-01-01 PROCEDURE — 95816 EEG AWAKE AND DROWSY: CPT

## 2022-01-01 PROCEDURE — 87070 CULTURE OTHR SPECIMN AEROBIC: CPT

## 2022-01-01 PROCEDURE — 89220 SPUTUM SPECIMEN COLLECTION: CPT

## 2022-01-01 PROCEDURE — 36573 INSJ PICC RS&I 5 YR+: CPT | Performed by: RADIOLOGY

## 2022-01-01 PROCEDURE — 2500000003 HC RX 250 WO HCPCS: Performed by: INTERNAL MEDICINE

## 2022-01-01 PROCEDURE — 2500000003 HC RX 250 WO HCPCS

## 2022-01-01 PROCEDURE — 6360000002 HC RX W HCPCS: Performed by: EMERGENCY MEDICINE

## 2022-01-01 PROCEDURE — 85730 THROMBOPLASTIN TIME PARTIAL: CPT

## 2022-01-01 PROCEDURE — 0BH17EZ INSERTION OF ENDOTRACHEAL AIRWAY INTO TRACHEA, VIA NATURAL OR ARTIFICIAL OPENING: ICD-10-PCS | Performed by: EMERGENCY MEDICINE

## 2022-01-01 PROCEDURE — 93970 EXTREMITY STUDY: CPT

## 2022-01-01 PROCEDURE — 87205 SMEAR GRAM STAIN: CPT

## 2022-01-01 PROCEDURE — 5A1955Z RESPIRATORY VENTILATION, GREATER THAN 96 CONSECUTIVE HOURS: ICD-10-PCS | Performed by: INTERNAL MEDICINE

## 2022-01-01 PROCEDURE — 93283 PRGRMG EVAL IMPLANTABLE DFB: CPT

## 2022-01-01 PROCEDURE — 93320 DOPPLER ECHO COMPLETE: CPT

## 2022-01-01 PROCEDURE — 2580000003 HC RX 258: Performed by: STUDENT IN AN ORGANIZED HEALTH CARE EDUCATION/TRAINING PROGRAM

## 2022-01-01 PROCEDURE — 99254 IP/OBS CNSLTJ NEW/EST MOD 60: CPT | Performed by: NURSE PRACTITIONER

## 2022-01-01 PROCEDURE — 94761 N-INVAS EAR/PLS OXIMETRY MLT: CPT

## 2022-01-01 PROCEDURE — 96374 THER/PROPH/DIAG INJ IV PUSH: CPT

## 2022-01-01 PROCEDURE — 86706 HEP B SURFACE ANTIBODY: CPT

## 2022-01-01 PROCEDURE — 36620 INSERTION CATHETER ARTERY: CPT

## 2022-01-01 PROCEDURE — 99254 IP/OBS CNSLTJ NEW/EST MOD 60: CPT | Performed by: INTERNAL MEDICINE

## 2022-01-01 PROCEDURE — 87635 SARS-COV-2 COVID-19 AMP PRB: CPT

## 2022-01-01 PROCEDURE — 94002 VENT MGMT INPAT INIT DAY: CPT

## 2022-01-01 PROCEDURE — 93308 TTE F-UP OR LMTD: CPT

## 2022-01-01 PROCEDURE — 51702 INSERT TEMP BLADDER CATH: CPT

## 2022-01-01 PROCEDURE — 82948 REAGENT STRIP/BLOOD GLUCOSE: CPT

## 2022-01-01 RX ORDER — PANTOPRAZOLE SODIUM 40 MG/10ML
40 INJECTION, POWDER, LYOPHILIZED, FOR SOLUTION INTRAVENOUS DAILY
Status: DISCONTINUED | OUTPATIENT
Start: 2022-01-01 | End: 2022-01-01 | Stop reason: HOSPADM

## 2022-01-01 RX ORDER — HEPARIN SODIUM 1000 [USP'U]/ML
4000 INJECTION, SOLUTION INTRAVENOUS; SUBCUTANEOUS PRN
Status: DISCONTINUED | OUTPATIENT
Start: 2022-01-01 | End: 2022-01-01 | Stop reason: HOSPADM

## 2022-01-01 RX ORDER — SODIUM CHLORIDE 0.9 % (FLUSH) 0.9 %
5-40 SYRINGE (ML) INJECTION PRN
Status: DISCONTINUED | OUTPATIENT
Start: 2022-01-01 | End: 2022-01-01 | Stop reason: HOSPADM

## 2022-01-01 RX ORDER — NOREPINEPHRINE BITARTRATE 1 MG/ML
INJECTION, SOLUTION INTRAVENOUS
Status: COMPLETED
Start: 2022-01-01 | End: 2022-01-01

## 2022-01-01 RX ORDER — ONDANSETRON 4 MG/1
4 TABLET, ORALLY DISINTEGRATING ORAL EVERY 8 HOURS PRN
Status: DISCONTINUED | OUTPATIENT
Start: 2022-01-01 | End: 2022-01-01

## 2022-01-01 RX ORDER — SODIUM CHLORIDE 9 MG/ML
250 INJECTION, SOLUTION INTRAVENOUS ONCE
Status: DISCONTINUED | OUTPATIENT
Start: 2022-01-01 | End: 2022-01-01 | Stop reason: HOSPADM

## 2022-01-01 RX ORDER — ALLOPURINOL 100 MG/1
100 TABLET ORAL DAILY
COMMUNITY
Start: 2021-01-01

## 2022-01-01 RX ORDER — LORAZEPAM 2 MG/ML
0.5 INJECTION INTRAMUSCULAR
Status: DISCONTINUED | OUTPATIENT
Start: 2022-01-01 | End: 2022-01-01 | Stop reason: HOSPADM

## 2022-01-01 RX ORDER — ONDANSETRON 2 MG/ML
4 INJECTION INTRAMUSCULAR; INTRAVENOUS EVERY 6 HOURS PRN
Status: DISCONTINUED | OUTPATIENT
Start: 2022-01-01 | End: 2022-01-01

## 2022-01-01 RX ORDER — CALCIUM CHLORIDE 100 MG/ML
1000 INJECTION INTRAVENOUS; INTRAVENTRICULAR ONCE
Status: COMPLETED | OUTPATIENT
Start: 2022-01-01 | End: 2022-01-01

## 2022-01-01 RX ORDER — NICOTINE POLACRILEX 4 MG
15 LOZENGE BUCCAL PRN
Status: DISCONTINUED | OUTPATIENT
Start: 2022-01-01 | End: 2022-01-01 | Stop reason: HOSPADM

## 2022-01-01 RX ORDER — POLYETHYLENE GLYCOL 3350 17 G/17G
17 POWDER, FOR SOLUTION ORAL DAILY PRN
Status: DISCONTINUED | OUTPATIENT
Start: 2022-01-01 | End: 2022-01-01 | Stop reason: HOSPADM

## 2022-01-01 RX ORDER — HEPARIN SODIUM 5000 [USP'U]/ML
5000 INJECTION, SOLUTION INTRAVENOUS; SUBCUTANEOUS EVERY 8 HOURS SCHEDULED
Status: DISCONTINUED | OUTPATIENT
Start: 2022-01-01 | End: 2022-01-01 | Stop reason: ALTCHOICE

## 2022-01-01 RX ORDER — CHLORHEXIDINE GLUCONATE 0.12 MG/ML
15 RINSE ORAL 2 TIMES DAILY
Status: DISCONTINUED | OUTPATIENT
Start: 2022-01-01 | End: 2022-01-01 | Stop reason: HOSPADM

## 2022-01-01 RX ORDER — DEXTROSE MONOHYDRATE 50 MG/ML
100 INJECTION, SOLUTION INTRAVENOUS PRN
Status: DISCONTINUED | OUTPATIENT
Start: 2022-01-01 | End: 2022-01-01 | Stop reason: HOSPADM

## 2022-01-01 RX ORDER — CHLORHEXIDINE GLUCONATE 0.12 MG/ML
15 RINSE ORAL 2 TIMES DAILY
Status: DISCONTINUED | OUTPATIENT
Start: 2022-01-01 | End: 2022-01-01 | Stop reason: SDUPTHER

## 2022-01-01 RX ORDER — LORAZEPAM 2 MG/ML
2 INJECTION INTRAMUSCULAR ONCE
Status: COMPLETED | OUTPATIENT
Start: 2022-01-01 | End: 2022-01-01

## 2022-01-01 RX ORDER — GUAIFENESIN 100 MG/5ML
200 SYRUP ORAL 3 TIMES DAILY PRN
Status: ON HOLD | COMMUNITY
Start: 2022-01-01 | End: 2022-01-01

## 2022-01-01 RX ORDER — SODIUM CHLORIDE 9 MG/ML
25 INJECTION, SOLUTION INTRAVENOUS PRN
Status: DISCONTINUED | OUTPATIENT
Start: 2022-01-01 | End: 2022-01-01 | Stop reason: HOSPADM

## 2022-01-01 RX ORDER — MAGNESIUM SULFATE IN WATER 40 MG/ML
2000 INJECTION, SOLUTION INTRAVENOUS ONCE
Status: DISCONTINUED | OUTPATIENT
Start: 2022-01-01 | End: 2022-01-01

## 2022-01-01 RX ORDER — COLCHICINE 0.6 MG/1
0.6 TABLET ORAL DAILY
COMMUNITY

## 2022-01-01 RX ORDER — SODIUM CHLORIDE 9 MG/ML
10 INJECTION INTRAVENOUS DAILY
Status: DISCONTINUED | OUTPATIENT
Start: 2022-01-01 | End: 2022-01-01 | Stop reason: HOSPADM

## 2022-01-01 RX ORDER — ONDANSETRON 4 MG/1
4 TABLET, ORALLY DISINTEGRATING ORAL EVERY 8 HOURS PRN
Status: DISCONTINUED | OUTPATIENT
Start: 2022-01-01 | End: 2022-01-01 | Stop reason: HOSPADM

## 2022-01-01 RX ORDER — GLYCOPYRROLATE 1 MG/5 ML
0.2 SYRINGE (ML) INTRAVENOUS EVERY 4 HOURS PRN
Status: DISCONTINUED | OUTPATIENT
Start: 2022-01-01 | End: 2022-01-01 | Stop reason: HOSPADM

## 2022-01-01 RX ORDER — LORAZEPAM 2 MG/ML
2 INJECTION INTRAMUSCULAR EVERY 6 HOURS PRN
Status: DISCONTINUED | OUTPATIENT
Start: 2022-01-01 | End: 2022-01-01 | Stop reason: HOSPADM

## 2022-01-01 RX ORDER — EPINEPHRINE 0.1 MG/ML
1 SYRINGE (ML) INJECTION ONCE
Status: COMPLETED | OUTPATIENT
Start: 2022-01-01 | End: 2022-01-01

## 2022-01-01 RX ORDER — 3% SODIUM CHLORIDE 3 G/100ML
50 INJECTION, SOLUTION INTRAVENOUS CONTINUOUS
Status: DISPENSED | OUTPATIENT
Start: 2022-01-01 | End: 2022-01-01

## 2022-01-01 RX ORDER — SUCCINYLCHOLINE CHLORIDE 20 MG/ML
100 INJECTION INTRAMUSCULAR; INTRAVENOUS ONCE
Status: COMPLETED | OUTPATIENT
Start: 2022-01-01 | End: 2022-01-01

## 2022-01-01 RX ORDER — HEPARIN SODIUM 1000 [USP'U]/ML
4000 INJECTION, SOLUTION INTRAVENOUS; SUBCUTANEOUS ONCE
Status: COMPLETED | OUTPATIENT
Start: 2022-01-01 | End: 2022-01-01

## 2022-01-01 RX ORDER — LIDOCAINE HYDROCHLORIDE 20 MG/ML
5 INJECTION, SOLUTION INFILTRATION; PERINEURAL ONCE
Status: DISCONTINUED | OUTPATIENT
Start: 2022-01-01 | End: 2022-01-01 | Stop reason: HOSPADM

## 2022-01-01 RX ORDER — DEXAMETHASONE SODIUM PHOSPHATE 4 MG/ML
6 INJECTION, SOLUTION INTRA-ARTICULAR; INTRALESIONAL; INTRAMUSCULAR; INTRAVENOUS; SOFT TISSUE EVERY 12 HOURS
Status: DISCONTINUED | OUTPATIENT
Start: 2022-01-01 | End: 2022-01-01 | Stop reason: RX

## 2022-01-01 RX ORDER — DIGOXIN 125 MCG
0.12 TABLET ORAL
COMMUNITY
Start: 2021-01-01

## 2022-01-01 RX ORDER — HEPARIN SODIUM 1000 [USP'U]/ML
2000 INJECTION, SOLUTION INTRAVENOUS; SUBCUTANEOUS PRN
Status: DISCONTINUED | OUTPATIENT
Start: 2022-01-01 | End: 2022-01-01 | Stop reason: HOSPADM

## 2022-01-01 RX ORDER — PANTOPRAZOLE SODIUM 40 MG/1
40 TABLET, DELAYED RELEASE ORAL
COMMUNITY
Start: 2021-01-01 | End: 2022-12-16

## 2022-01-01 RX ORDER — SODIUM CHLORIDE 0.9 % (FLUSH) 0.9 %
5-40 SYRINGE (ML) INJECTION EVERY 12 HOURS SCHEDULED
Status: DISCONTINUED | OUTPATIENT
Start: 2022-01-01 | End: 2022-01-01 | Stop reason: HOSPADM

## 2022-01-01 RX ORDER — ACETAMINOPHEN 325 MG/1
650 TABLET ORAL EVERY 6 HOURS PRN
Status: DISCONTINUED | OUTPATIENT
Start: 2022-01-01 | End: 2022-01-01 | Stop reason: HOSPADM

## 2022-01-01 RX ORDER — HEPARIN SODIUM AND DEXTROSE 10000; 5 [USP'U]/100ML; G/100ML
5-30 INJECTION INTRAVENOUS CONTINUOUS
Status: DISCONTINUED | OUTPATIENT
Start: 2022-01-01 | End: 2022-01-01 | Stop reason: RX

## 2022-01-01 RX ORDER — DEXAMETHASONE SODIUM PHOSPHATE 10 MG/ML
6 INJECTION INTRAMUSCULAR; INTRAVENOUS EVERY 12 HOURS
Status: DISCONTINUED | OUTPATIENT
Start: 2022-01-01 | End: 2022-01-01 | Stop reason: HOSPADM

## 2022-01-01 RX ORDER — ASPIRIN 81 MG/1
81 TABLET ORAL DAILY
COMMUNITY

## 2022-01-01 RX ORDER — HEPARIN SODIUM 5000 [USP'U]/ML
5000 INJECTION, SOLUTION INTRAVENOUS; SUBCUTANEOUS EVERY 8 HOURS SCHEDULED
Status: DISCONTINUED | OUTPATIENT
Start: 2022-01-01 | End: 2022-01-01

## 2022-01-01 RX ORDER — CARVEDILOL 3.12 MG/1
3.12 TABLET ORAL 2 TIMES DAILY WITH MEALS
COMMUNITY

## 2022-01-01 RX ORDER — ACETAMINOPHEN 650 MG/1
650 SUPPOSITORY RECTAL EVERY 6 HOURS PRN
Status: DISCONTINUED | OUTPATIENT
Start: 2022-01-01 | End: 2022-01-01 | Stop reason: HOSPADM

## 2022-01-01 RX ORDER — PROPOFOL 10 MG/ML
5-50 INJECTION, EMULSION INTRAVENOUS
Status: DISCONTINUED | OUTPATIENT
Start: 2022-01-01 | End: 2022-01-01 | Stop reason: DRUGHIGH

## 2022-01-01 RX ORDER — SUCCINYLCHOLINE CHLORIDE 20 MG/ML
INJECTION INTRAMUSCULAR; INTRAVENOUS
Status: COMPLETED
Start: 2022-01-01 | End: 2022-01-01

## 2022-01-01 RX ORDER — FERROUS SULFATE 325(65) MG
325 TABLET ORAL
COMMUNITY

## 2022-01-01 RX ORDER — PROPOFOL 10 MG/ML
5-50 INJECTION, EMULSION INTRAVENOUS
Status: DISCONTINUED | OUTPATIENT
Start: 2022-01-01 | End: 2022-01-01 | Stop reason: HOSPADM

## 2022-01-01 RX ORDER — HEPARIN SODIUM 5000 [USP'U]/ML
5000 INJECTION, SOLUTION INTRAVENOUS; SUBCUTANEOUS EVERY 8 HOURS SCHEDULED
Status: DISCONTINUED | OUTPATIENT
Start: 2022-01-01 | End: 2022-01-01 | Stop reason: SDUPTHER

## 2022-01-01 RX ORDER — SENNA AND DOCUSATE SODIUM 50; 8.6 MG/1; MG/1
2 TABLET, FILM COATED ORAL 2 TIMES DAILY
Status: DISCONTINUED | OUTPATIENT
Start: 2022-01-01 | End: 2022-01-01 | Stop reason: HOSPADM

## 2022-01-01 RX ORDER — FINASTERIDE 5 MG/1
TABLET, FILM COATED ORAL
COMMUNITY
Start: 2021-01-01

## 2022-01-01 RX ORDER — ONDANSETRON 2 MG/ML
4 INJECTION INTRAMUSCULAR; INTRAVENOUS EVERY 6 HOURS PRN
Status: DISCONTINUED | OUTPATIENT
Start: 2022-01-01 | End: 2022-01-01 | Stop reason: HOSPADM

## 2022-01-01 RX ORDER — DEXTROSE MONOHYDRATE 25 G/50ML
12.5 INJECTION, SOLUTION INTRAVENOUS PRN
Status: DISCONTINUED | OUTPATIENT
Start: 2022-01-01 | End: 2022-01-01 | Stop reason: HOSPADM

## 2022-01-01 RX ORDER — DEXTROSE MONOHYDRATE 25 G/50ML
25 INJECTION, SOLUTION INTRAVENOUS ONCE
Status: COMPLETED | OUTPATIENT
Start: 2022-01-01 | End: 2022-01-01

## 2022-01-01 RX ADMIN — LEVETIRACETAM 500 MG: 100 INJECTION INTRAVENOUS at 09:00

## 2022-01-01 RX ADMIN — INSULIN LISPRO 2 UNITS: 100 INJECTION, SOLUTION INTRAVENOUS; SUBCUTANEOUS at 05:52

## 2022-01-01 RX ADMIN — PROPOFOL 50 MCG/KG/MIN: 10 INJECTION, EMULSION INTRAVENOUS at 06:19

## 2022-01-01 RX ADMIN — PANTOPRAZOLE SODIUM 40 MG: 40 INJECTION, POWDER, FOR SOLUTION INTRAVENOUS at 08:09

## 2022-01-01 RX ADMIN — PROPOFOL 50 MCG/KG/MIN: 10 INJECTION, EMULSION INTRAVENOUS at 18:00

## 2022-01-01 RX ADMIN — NOREPINEPHRINE BITARTRATE 4 MG: 1 INJECTION INTRAVENOUS at 19:06

## 2022-01-01 RX ADMIN — HEPARIN SODIUM 15 UNITS/KG/HR: 10000 INJECTION INTRAVENOUS; SUBCUTANEOUS at 11:01

## 2022-01-01 RX ADMIN — DEXTROSE MONOHYDRATE 200 MG: 50 INJECTION, SOLUTION INTRAVENOUS at 14:18

## 2022-01-01 RX ADMIN — PROPOFOL 50 MCG/KG/MIN: 10 INJECTION, EMULSION INTRAVENOUS at 03:11

## 2022-01-01 RX ADMIN — FENTANYL CITRATE 200 MCG/HR: 0.05 INJECTION, SOLUTION INTRAMUSCULAR; INTRAVENOUS at 21:33

## 2022-01-01 RX ADMIN — DEXTROSE MONOHYDRATE 100 MG: 50 INJECTION, SOLUTION INTRAVENOUS at 21:00

## 2022-01-01 RX ADMIN — PROPOFOL 50 MCG/KG/MIN: 10 INJECTION, EMULSION INTRAVENOUS at 23:27

## 2022-01-01 RX ADMIN — INSULIN HUMAN 10 UNITS: 100 INJECTION, SOLUTION PARENTERAL at 19:29

## 2022-01-01 RX ADMIN — LORAZEPAM 0.5 MG: 2 INJECTION, SOLUTION INTRAMUSCULAR; INTRAVENOUS at 14:27

## 2022-01-01 RX ADMIN — PROPOFOL 50 MCG/KG/MIN: 10 INJECTION, EMULSION INTRAVENOUS at 12:16

## 2022-01-01 RX ADMIN — DEXAMETHASONE SODIUM PHOSPHATE 6 MG: 4 INJECTION, SOLUTION INTRAMUSCULAR; INTRAVENOUS at 11:28

## 2022-01-01 RX ADMIN — DEXTROSE MONOHYDRATE 150 MG: 50 INJECTION, SOLUTION INTRAVENOUS at 18:57

## 2022-01-01 RX ADMIN — VANCOMYCIN HYDROCHLORIDE 1000 MG: 1 INJECTION, POWDER, LYOPHILIZED, FOR SOLUTION INTRAVENOUS at 02:14

## 2022-01-01 RX ADMIN — FENTANYL CITRATE 200 MCG/HR: 0.05 INJECTION, SOLUTION INTRAMUSCULAR; INTRAVENOUS at 15:52

## 2022-01-01 RX ADMIN — Medication 10 ML: at 20:58

## 2022-01-01 RX ADMIN — VANCOMYCIN HYDROCHLORIDE 1250 MG: 5 INJECTION, POWDER, LYOPHILIZED, FOR SOLUTION INTRAVENOUS at 14:52

## 2022-01-01 RX ADMIN — Medication 15 MCG/MIN: at 18:34

## 2022-01-01 RX ADMIN — MIDAZOLAM 6 MG/HR: 5 INJECTION INTRAMUSCULAR; INTRAVENOUS at 01:34

## 2022-01-01 RX ADMIN — LORAZEPAM 2 MG: 2 INJECTION INTRAMUSCULAR; INTRAVENOUS at 13:08

## 2022-01-01 RX ADMIN — DEXAMETHASONE SODIUM PHOSPHATE 6 MG: 4 INJECTION, SOLUTION INTRAMUSCULAR; INTRAVENOUS at 01:49

## 2022-01-01 RX ADMIN — PROPOFOL 50 MCG/KG/MIN: 10 INJECTION, EMULSION INTRAVENOUS at 05:34

## 2022-01-01 RX ADMIN — FENTANYL CITRATE 200 MCG/HR: 0.05 INJECTION, SOLUTION INTRAMUSCULAR; INTRAVENOUS at 12:14

## 2022-01-01 RX ADMIN — DEXTROSE MONOHYDRATE 100 MG: 50 INJECTION, SOLUTION INTRAVENOUS at 21:17

## 2022-01-01 RX ADMIN — SODIUM CHLORIDE, PRESERVATIVE FREE 10 ML: 5 INJECTION INTRAVENOUS at 09:16

## 2022-01-01 RX ADMIN — SODIUM CHLORIDE, PRESERVATIVE FREE 10 ML: 5 INJECTION INTRAVENOUS at 20:41

## 2022-01-01 RX ADMIN — HEPARIN SODIUM 5000 UNITS: 5000 INJECTION, SOLUTION INTRAVENOUS; SUBCUTANEOUS at 02:15

## 2022-01-01 RX ADMIN — Medication 18 MCG/MIN: at 20:59

## 2022-01-01 RX ADMIN — FENTANYL CITRATE 200 MCG/HR: 0.05 INJECTION, SOLUTION INTRAMUSCULAR; INTRAVENOUS at 00:39

## 2022-01-01 RX ADMIN — PIPERACILLIN SODIUM AND TAZOBACTAM SODIUM 2250 MG: 2; 250 INJECTION, POWDER, FOR SOLUTION INTRAVENOUS at 20:00

## 2022-01-01 RX ADMIN — PROPOFOL 50 MCG/KG/MIN: 10 INJECTION, EMULSION INTRAVENOUS at 23:56

## 2022-01-01 RX ADMIN — HYDROMORPHONE HYDROCHLORIDE 0.5 MG: 1 INJECTION, SOLUTION INTRAMUSCULAR; INTRAVENOUS; SUBCUTANEOUS at 14:27

## 2022-01-01 RX ADMIN — INSULIN LISPRO 2 UNITS: 100 INJECTION, SOLUTION INTRAVENOUS; SUBCUTANEOUS at 10:09

## 2022-01-01 RX ADMIN — Medication 10 ML: at 08:26

## 2022-01-01 RX ADMIN — MIDAZOLAM 1 MG/HR: 5 INJECTION INTRAMUSCULAR; INTRAVENOUS at 14:15

## 2022-01-01 RX ADMIN — EPINEPHRINE 1 MG: 0.1 INJECTION INTRACARDIAC; INTRAVENOUS at 19:53

## 2022-01-01 RX ADMIN — PROPOFOL 50 MCG/KG/MIN: 10 INJECTION, EMULSION INTRAVENOUS at 09:26

## 2022-01-01 RX ADMIN — FENTANYL CITRATE 200 MCG/HR: 0.05 INJECTION, SOLUTION INTRAMUSCULAR; INTRAVENOUS at 02:26

## 2022-01-01 RX ADMIN — INSULIN LISPRO 2 UNITS: 100 INJECTION, SOLUTION INTRAVENOUS; SUBCUTANEOUS at 11:10

## 2022-01-01 RX ADMIN — DEXAMETHASONE SODIUM PHOSPHATE 6 MG: 4 INJECTION, SOLUTION INTRAMUSCULAR; INTRAVENOUS at 22:00

## 2022-01-01 RX ADMIN — CHLORHEXIDINE GLUCONATE 0.12% ORAL RINSE 15 ML: 1.2 LIQUID ORAL at 20:39

## 2022-01-01 RX ADMIN — HEPARIN SODIUM 4000 UNITS: 1000 INJECTION, SOLUTION INTRAVENOUS; SUBCUTANEOUS at 05:30

## 2022-01-01 RX ADMIN — LORAZEPAM 2 MG: 2 INJECTION INTRAMUSCULAR; INTRAVENOUS at 17:41

## 2022-01-01 RX ADMIN — CHLORHEXIDINE GLUCONATE 0.12% ORAL RINSE 15 ML: 1.2 LIQUID ORAL at 20:54

## 2022-01-01 RX ADMIN — INSULIN LISPRO 2 UNITS: 100 INJECTION, SOLUTION INTRAVENOUS; SUBCUTANEOUS at 04:15

## 2022-01-01 RX ADMIN — LORAZEPAM 2 MG: 2 INJECTION INTRAMUSCULAR; INTRAVENOUS at 08:41

## 2022-01-01 RX ADMIN — SODIUM BICARBONATE 50 MEQ: 84 INJECTION, SOLUTION INTRAVENOUS at 19:47

## 2022-01-01 RX ADMIN — SODIUM BICARBONATE 50 MEQ: 84 INJECTION, SOLUTION INTRAVENOUS at 18:04

## 2022-01-01 RX ADMIN — PROPOFOL 50 MCG/KG/MIN: 10 INJECTION, EMULSION INTRAVENOUS at 20:12

## 2022-01-01 RX ADMIN — HEPARIN SODIUM 4000 UNITS: 1000 INJECTION INTRAVENOUS; SUBCUTANEOUS at 02:23

## 2022-01-01 RX ADMIN — FENTANYL CITRATE 200 MCG/HR: 0.05 INJECTION, SOLUTION INTRAMUSCULAR; INTRAVENOUS at 22:16

## 2022-01-01 RX ADMIN — LORAZEPAM 2 MG: 2 INJECTION INTRAMUSCULAR; INTRAVENOUS at 18:30

## 2022-01-01 RX ADMIN — PROPOFOL 50 MCG/KG/MIN: 10 INJECTION, EMULSION INTRAVENOUS at 23:38

## 2022-01-01 RX ADMIN — PIPERACILLIN SODIUM AND TAZOBACTAM SODIUM 2250 MG: 2; 250 INJECTION, POWDER, FOR SOLUTION INTRAVENOUS at 01:48

## 2022-01-01 RX ADMIN — SENNOSIDES AND DOCUSATE SODIUM 2 TABLET: 50; 8.6 TABLET ORAL at 08:09

## 2022-01-01 RX ADMIN — HEPARIN SODIUM 2000 UNITS: 1000 INJECTION INTRAVENOUS; SUBCUTANEOUS at 19:18

## 2022-01-01 RX ADMIN — PROPOFOL 50 MCG/KG/MIN: 10 INJECTION, EMULSION INTRAVENOUS at 08:23

## 2022-01-01 RX ADMIN — HEPARIN SODIUM 3 UNITS/KG/HR: 10000 INJECTION INTRAVENOUS; SUBCUTANEOUS at 21:08

## 2022-01-01 RX ADMIN — Medication 10 ML: at 20:05

## 2022-01-01 RX ADMIN — MIDAZOLAM 6 MG/HR: 5 INJECTION INTRAMUSCULAR; INTRAVENOUS at 08:57

## 2022-01-01 RX ADMIN — PANTOPRAZOLE SODIUM 40 MG: 40 INJECTION, POWDER, FOR SOLUTION INTRAVENOUS at 10:03

## 2022-01-01 RX ADMIN — PIPERACILLIN SODIUM AND TAZOBACTAM SODIUM 2250 MG: 2; 250 INJECTION, POWDER, FOR SOLUTION INTRAVENOUS at 20:08

## 2022-01-01 RX ADMIN — FENTANYL CITRATE 200 MCG/HR: 0.05 INJECTION, SOLUTION INTRAMUSCULAR; INTRAVENOUS at 04:10

## 2022-01-01 RX ADMIN — INSULIN LISPRO 2 UNITS: 100 INJECTION, SOLUTION INTRAVENOUS; SUBCUTANEOUS at 08:47

## 2022-01-01 RX ADMIN — PANTOPRAZOLE SODIUM 40 MG: 40 INJECTION, POWDER, FOR SOLUTION INTRAVENOUS at 12:16

## 2022-01-01 RX ADMIN — HEPARIN SODIUM 12 UNITS/KG/HR: 10000 INJECTION INTRAVENOUS; SUBCUTANEOUS at 05:32

## 2022-01-01 RX ADMIN — NOREPINEPHRINE BITARTRATE 20 MCG/MIN: 1 INJECTION INTRAVENOUS at 20:09

## 2022-01-01 RX ADMIN — CHLORHEXIDINE GLUCONATE 0.12% ORAL RINSE 15 ML: 1.2 LIQUID ORAL at 03:45

## 2022-01-01 RX ADMIN — HEPARIN SODIUM 11 UNITS/KG/HR: 10000 INJECTION INTRAVENOUS; SUBCUTANEOUS at 10:29

## 2022-01-01 RX ADMIN — PROPOFOL 50 MCG/KG/MIN: 10 INJECTION, EMULSION INTRAVENOUS at 12:43

## 2022-01-01 RX ADMIN — LEVETIRACETAM 500 MG: 100 INJECTION INTRAVENOUS at 08:44

## 2022-01-01 RX ADMIN — INSULIN LISPRO 2 UNITS: 100 INJECTION, SOLUTION INTRAVENOUS; SUBCUTANEOUS at 20:19

## 2022-01-01 RX ADMIN — SODIUM CHLORIDE, PRESERVATIVE FREE 10 ML: 5 INJECTION INTRAVENOUS at 11:30

## 2022-01-01 RX ADMIN — LEVETIRACETAM 500 MG: 100 INJECTION INTRAVENOUS at 13:19

## 2022-01-01 RX ADMIN — NOREPINEPHRINE BITARTRATE 5 MCG/MIN: 1 INJECTION INTRAVENOUS at 19:12

## 2022-01-01 RX ADMIN — DEXAMETHASONE SODIUM PHOSPHATE 6 MG: 4 INJECTION, SOLUTION INTRAMUSCULAR; INTRAVENOUS at 23:02

## 2022-01-01 RX ADMIN — SUCCINYLCHOLINE CHLORIDE 100 MG: 20 INJECTION INTRAMUSCULAR; INTRAVENOUS at 17:47

## 2022-01-01 RX ADMIN — DEXTROSE MONOHYDRATE 100 MG: 50 INJECTION, SOLUTION INTRAVENOUS at 09:13

## 2022-01-01 RX ADMIN — DEXTROSE MONOHYDRATE 100 MG: 50 INJECTION, SOLUTION INTRAVENOUS at 09:10

## 2022-01-01 RX ADMIN — DEXTROSE MONOHYDRATE 100 MG: 50 INJECTION, SOLUTION INTRAVENOUS at 09:00

## 2022-01-01 RX ADMIN — FENTANYL CITRATE 200 MCG/HR: 0.05 INJECTION, SOLUTION INTRAMUSCULAR; INTRAVENOUS at 13:19

## 2022-01-01 RX ADMIN — SENNOSIDES AND DOCUSATE SODIUM 2 TABLET: 50; 8.6 TABLET ORAL at 12:16

## 2022-01-01 RX ADMIN — CHLORHEXIDINE GLUCONATE 0.12% ORAL RINSE 15 ML: 1.2 LIQUID ORAL at 08:33

## 2022-01-01 RX ADMIN — PIPERACILLIN SODIUM AND TAZOBACTAM SODIUM 2250 MG: 2; 250 INJECTION, POWDER, FOR SOLUTION INTRAVENOUS at 20:04

## 2022-01-01 RX ADMIN — DEXAMETHASONE SODIUM PHOSPHATE 6 MG: 4 INJECTION, SOLUTION INTRAMUSCULAR; INTRAVENOUS at 12:15

## 2022-01-01 RX ADMIN — PIPERACILLIN SODIUM AND TAZOBACTAM SODIUM 2250 MG: 2; 250 INJECTION, POWDER, FOR SOLUTION INTRAVENOUS at 03:54

## 2022-01-01 RX ADMIN — PROPOFOL 50 MCG/KG/MIN: 10 INJECTION, EMULSION INTRAVENOUS at 01:14

## 2022-01-01 RX ADMIN — PROPOFOL 50 MCG/KG/MIN: 10 INJECTION, EMULSION INTRAVENOUS at 04:16

## 2022-01-01 RX ADMIN — VASOPRESSIN 0.04 UNITS/MIN: 20 INJECTION INTRAVENOUS at 03:33

## 2022-01-01 RX ADMIN — INSULIN LISPRO 2 UNITS: 100 INJECTION, SOLUTION INTRAVENOUS; SUBCUTANEOUS at 16:42

## 2022-01-01 RX ADMIN — DEXAMETHASONE SODIUM PHOSPHATE 6 MG: 4 INJECTION, SOLUTION INTRAMUSCULAR; INTRAVENOUS at 21:35

## 2022-01-01 RX ADMIN — INSULIN LISPRO 2 UNITS: 100 INJECTION, SOLUTION INTRAVENOUS; SUBCUTANEOUS at 13:27

## 2022-01-01 RX ADMIN — Medication 15 MCG/MIN: at 17:00

## 2022-01-01 RX ADMIN — NOREPINEPHRINE BITARTRATE 14 MCG/MIN: 1 INJECTION INTRAVENOUS at 16:51

## 2022-01-01 RX ADMIN — DEXTROSE MONOHYDRATE 100 MG: 50 INJECTION, SOLUTION INTRAVENOUS at 21:59

## 2022-01-01 RX ADMIN — PROPOFOL 50 MCG/KG/MIN: 10 INJECTION, EMULSION INTRAVENOUS at 05:48

## 2022-01-01 RX ADMIN — CHLORHEXIDINE GLUCONATE 0.12% ORAL RINSE 15 ML: 1.2 LIQUID ORAL at 09:14

## 2022-01-01 RX ADMIN — CHLORHEXIDINE GLUCONATE 0.12% ORAL RINSE 15 ML: 1.2 LIQUID ORAL at 10:02

## 2022-01-01 RX ADMIN — DEXTROSE MONOHYDRATE 100 MG: 50 INJECTION, SOLUTION INTRAVENOUS at 12:32

## 2022-01-01 RX ADMIN — MIDAZOLAM 6 MG/HR: 5 INJECTION INTRAMUSCULAR; INTRAVENOUS at 13:31

## 2022-01-01 RX ADMIN — LEVETIRACETAM 500 MG: 100 INJECTION INTRAVENOUS at 09:47

## 2022-01-01 RX ADMIN — SENNOSIDES AND DOCUSATE SODIUM 2 TABLET: 50; 8.6 TABLET ORAL at 20:00

## 2022-01-01 RX ADMIN — SODIUM CHLORIDE, PRESERVATIVE FREE 10 ML: 5 INJECTION INTRAVENOUS at 12:16

## 2022-01-01 RX ADMIN — Medication 10 ML: at 20:17

## 2022-01-01 RX ADMIN — PROPOFOL 50 MCG/KG/MIN: 10 INJECTION, EMULSION INTRAVENOUS at 20:39

## 2022-01-01 RX ADMIN — INSULIN LISPRO 2 UNITS: 100 INJECTION, SOLUTION INTRAVENOUS; SUBCUTANEOUS at 13:32

## 2022-01-01 RX ADMIN — INSULIN LISPRO 2 UNITS: 100 INJECTION, SOLUTION INTRAVENOUS; SUBCUTANEOUS at 04:10

## 2022-01-01 RX ADMIN — PROPOFOL 50 MCG/KG/MIN: 10 INJECTION, EMULSION INTRAVENOUS at 20:00

## 2022-01-01 RX ADMIN — LEVETIRACETAM 500 MG: 100 INJECTION INTRAVENOUS at 10:02

## 2022-01-01 RX ADMIN — MIDAZOLAM 5 MG/HR: 5 INJECTION INTRAMUSCULAR; INTRAVENOUS at 20:31

## 2022-01-01 RX ADMIN — LORAZEPAM 2 MG: 2 INJECTION INTRAMUSCULAR; INTRAVENOUS at 09:48

## 2022-01-01 RX ADMIN — NOREPINEPHRINE BITARTRATE 15 MCG/MIN: 1 INJECTION INTRAVENOUS at 19:31

## 2022-01-01 RX ADMIN — SODIUM CHLORIDE, PRESERVATIVE FREE 10 ML: 5 INJECTION INTRAVENOUS at 20:59

## 2022-01-01 RX ADMIN — PIPERACILLIN SODIUM AND TAZOBACTAM SODIUM 2250 MG: 2; 250 INJECTION, POWDER, FOR SOLUTION INTRAVENOUS at 11:06

## 2022-01-01 RX ADMIN — PIPERACILLIN SODIUM AND TAZOBACTAM SODIUM 2250 MG: 2; 250 INJECTION, POWDER, FOR SOLUTION INTRAVENOUS at 03:24

## 2022-01-01 RX ADMIN — PANTOPRAZOLE SODIUM 40 MG: 40 INJECTION, POWDER, FOR SOLUTION INTRAVENOUS at 09:14

## 2022-01-01 RX ADMIN — INSULIN LISPRO 2 UNITS: 100 INJECTION, SOLUTION INTRAVENOUS; SUBCUTANEOUS at 08:53

## 2022-01-01 RX ADMIN — Medication 10 ML: at 20:41

## 2022-01-01 RX ADMIN — Medication 16 MG: at 21:34

## 2022-01-01 RX ADMIN — PROPOFOL 50 MCG/KG/MIN: 10 INJECTION, EMULSION INTRAVENOUS at 03:10

## 2022-01-01 RX ADMIN — PIPERACILLIN SODIUM AND TAZOBACTAM SODIUM 2250 MG: 2; 250 INJECTION, POWDER, FOR SOLUTION INTRAVENOUS at 03:21

## 2022-01-01 RX ADMIN — FENTANYL CITRATE 200 MCG/HR: 0.05 INJECTION, SOLUTION INTRAMUSCULAR; INTRAVENOUS at 08:16

## 2022-01-01 RX ADMIN — PIPERACILLIN SODIUM AND TAZOBACTAM SODIUM 2250 MG: 2; 250 INJECTION, POWDER, FOR SOLUTION INTRAVENOUS at 05:39

## 2022-01-01 RX ADMIN — PROPOFOL 50 MCG/KG/MIN: 10 INJECTION, EMULSION INTRAVENOUS at 08:44

## 2022-01-01 RX ADMIN — NOREPINEPHRINE BITARTRATE 28 MCG/MIN: 1 INJECTION INTRAVENOUS at 10:23

## 2022-01-01 RX ADMIN — INSULIN LISPRO 2 UNITS: 100 INJECTION, SOLUTION INTRAVENOUS; SUBCUTANEOUS at 21:12

## 2022-01-01 RX ADMIN — ONDANSETRON 4 MG: 2 INJECTION INTRAMUSCULAR; INTRAVENOUS at 19:47

## 2022-01-01 RX ADMIN — HEPARIN SODIUM 5 UNITS/KG/HR: 10000 INJECTION INTRAVENOUS; SUBCUTANEOUS at 07:37

## 2022-01-01 RX ADMIN — FENTANYL CITRATE 200 MCG/HR: 0.05 INJECTION, SOLUTION INTRAMUSCULAR; INTRAVENOUS at 02:44

## 2022-01-01 RX ADMIN — PROPOFOL 50 MCG/KG/MIN: 10 INJECTION, EMULSION INTRAVENOUS at 15:52

## 2022-01-01 RX ADMIN — SENNOSIDES AND DOCUSATE SODIUM 2 TABLET: 50; 8.6 TABLET ORAL at 08:33

## 2022-01-01 RX ADMIN — NOREPINEPHRINE BITARTRATE 30 MCG/MIN: 1 INJECTION INTRAVENOUS at 12:09

## 2022-01-01 RX ADMIN — NOREPINEPHRINE BITARTRATE 60 MCG/MIN: 1 INJECTION INTRAVENOUS at 03:32

## 2022-01-01 RX ADMIN — FENTANYL CITRATE 200 MCG/HR: 0.05 INJECTION, SOLUTION INTRAMUSCULAR; INTRAVENOUS at 12:00

## 2022-01-01 RX ADMIN — SODIUM CHLORIDE 1 MCG/KG/HR: 9 INJECTION, SOLUTION INTRAVENOUS at 18:44

## 2022-01-01 RX ADMIN — CHLORHEXIDINE GLUCONATE 0.12% ORAL RINSE 15 ML: 1.2 LIQUID ORAL at 20:38

## 2022-01-01 RX ADMIN — HEPARIN SODIUM 2000 UNITS: 1000 INJECTION INTRAVENOUS; SUBCUTANEOUS at 16:44

## 2022-01-01 RX ADMIN — DEXTROSE MONOHYDRATE 25 G: 25 INJECTION, SOLUTION INTRAVENOUS at 19:30

## 2022-01-01 RX ADMIN — FENTANYL CITRATE 175 MCG/HR: 0.05 INJECTION, SOLUTION INTRAMUSCULAR; INTRAVENOUS at 10:04

## 2022-01-01 RX ADMIN — MIDAZOLAM 6 MG/HR: 5 INJECTION INTRAMUSCULAR; INTRAVENOUS at 06:17

## 2022-01-01 RX ADMIN — FENTANYL CITRATE 200 MCG/HR: 0.05 INJECTION, SOLUTION INTRAMUSCULAR; INTRAVENOUS at 05:37

## 2022-01-01 RX ADMIN — SODIUM CHLORIDE, PRESERVATIVE FREE 10 ML: 5 INJECTION INTRAVENOUS at 08:46

## 2022-01-01 RX ADMIN — PROPOFOL 50 MCG/KG/MIN: 10 INJECTION, EMULSION INTRAVENOUS at 12:10

## 2022-01-01 RX ADMIN — CALCIUM CHLORIDE INJECTION 1000 MG: 100 INJECTION, SOLUTION INTRAVENOUS at 19:20

## 2022-01-01 RX ADMIN — FENTANYL CITRATE 200 MCG/HR: 0.05 INJECTION, SOLUTION INTRAMUSCULAR; INTRAVENOUS at 17:56

## 2022-01-01 RX ADMIN — PROPOFOL 50 MCG/KG/MIN: 10 INJECTION, EMULSION INTRAVENOUS at 20:05

## 2022-01-01 RX ADMIN — INSULIN LISPRO 2 UNITS: 100 INJECTION, SOLUTION INTRAVENOUS; SUBCUTANEOUS at 16:27

## 2022-01-01 RX ADMIN — SENNOSIDES AND DOCUSATE SODIUM 2 TABLET: 50; 8.6 TABLET ORAL at 08:39

## 2022-01-01 RX ADMIN — SODIUM CHLORIDE, PRESERVATIVE FREE 10 ML: 5 INJECTION INTRAVENOUS at 20:18

## 2022-01-01 RX ADMIN — INSULIN LISPRO 2 UNITS: 100 INJECTION, SOLUTION INTRAVENOUS; SUBCUTANEOUS at 20:55

## 2022-01-01 RX ADMIN — DEXTROSE MONOHYDRATE 100 MG: 50 INJECTION, SOLUTION INTRAVENOUS at 23:37

## 2022-01-01 RX ADMIN — DEXAMETHASONE SODIUM PHOSPHATE 6 MG: 4 INJECTION, SOLUTION INTRAMUSCULAR; INTRAVENOUS at 22:16

## 2022-01-01 RX ADMIN — CHLORHEXIDINE GLUCONATE 0.12% ORAL RINSE 15 ML: 1.2 LIQUID ORAL at 20:01

## 2022-01-01 RX ADMIN — PIPERACILLIN SODIUM AND TAZOBACTAM SODIUM 2250 MG: 2; 250 INJECTION, POWDER, FOR SOLUTION INTRAVENOUS at 20:53

## 2022-01-01 RX ADMIN — EPINEPHRINE 5 MCG/MIN: 1 INJECTION INTRAMUSCULAR; INTRAVENOUS; SUBCUTANEOUS at 19:53

## 2022-01-01 RX ADMIN — INSULIN LISPRO 2 UNITS: 100 INJECTION, SOLUTION INTRAVENOUS; SUBCUTANEOUS at 23:56

## 2022-01-01 RX ADMIN — PROPOFOL 50 MCG/KG/MIN: 10 INJECTION, EMULSION INTRAVENOUS at 22:45

## 2022-01-01 RX ADMIN — FENTANYL CITRATE 175 MCG/HR: 0.05 INJECTION, SOLUTION INTRAMUSCULAR; INTRAVENOUS at 18:00

## 2022-01-01 RX ADMIN — PROPOFOL 50 MCG/KG/MIN: 10 INJECTION, EMULSION INTRAVENOUS at 16:35

## 2022-01-01 RX ADMIN — SODIUM CHLORIDE, PRESERVATIVE FREE 10 ML: 5 INJECTION INTRAVENOUS at 20:08

## 2022-01-01 RX ADMIN — FENTANYL CITRATE 175 MCG/HR: 0.05 INJECTION, SOLUTION INTRAMUSCULAR; INTRAVENOUS at 03:22

## 2022-01-01 RX ADMIN — PIPERACILLIN SODIUM AND TAZOBACTAM SODIUM 2250 MG: 2; 250 INJECTION, POWDER, FOR SOLUTION INTRAVENOUS at 16:10

## 2022-01-01 RX ADMIN — SENNOSIDES AND DOCUSATE SODIUM 2 TABLET: 50; 8.6 TABLET ORAL at 20:53

## 2022-01-01 RX ADMIN — PANTOPRAZOLE SODIUM 40 MG: 40 INJECTION, POWDER, FOR SOLUTION INTRAVENOUS at 08:41

## 2022-01-01 RX ADMIN — PROPOFOL 50 MCG/KG/MIN: 10 INJECTION, EMULSION INTRAVENOUS at 17:55

## 2022-01-01 RX ADMIN — SODIUM CHLORIDE 1 MG/MIN: 0.9 INJECTION, SOLUTION INTRAVENOUS at 19:09

## 2022-01-01 RX ADMIN — FENTANYL CITRATE 200 MCG/HR: 0.05 INJECTION, SOLUTION INTRAMUSCULAR; INTRAVENOUS at 21:09

## 2022-01-01 RX ADMIN — Medication 10 ML: at 09:16

## 2022-01-01 RX ADMIN — NOREPINEPHRINE BITARTRATE 100 MCG/MIN: 1 INJECTION INTRAVENOUS at 23:55

## 2022-01-01 RX ADMIN — VANCOMYCIN HYDROCHLORIDE 1250 MG: 5 INJECTION, POWDER, LYOPHILIZED, FOR SOLUTION INTRAVENOUS at 20:34

## 2022-01-01 RX ADMIN — PROPOFOL 50 MCG/KG/MIN: 10 INJECTION, EMULSION INTRAVENOUS at 21:09

## 2022-01-01 RX ADMIN — PIPERACILLIN SODIUM AND TAZOBACTAM SODIUM 2250 MG: 2; 250 INJECTION, POWDER, FOR SOLUTION INTRAVENOUS at 12:27

## 2022-01-01 RX ADMIN — FENTANYL CITRATE 50 MCG/HR: 0.05 INJECTION, SOLUTION INTRAMUSCULAR; INTRAVENOUS at 05:36

## 2022-01-01 RX ADMIN — SENNOSIDES AND DOCUSATE SODIUM 2 TABLET: 50; 8.6 TABLET ORAL at 20:40

## 2022-01-01 RX ADMIN — FENTANYL CITRATE 175 MCG/HR: 0.05 INJECTION, SOLUTION INTRAMUSCULAR; INTRAVENOUS at 20:41

## 2022-01-01 RX ADMIN — PROPOFOL 50 MCG/KG/MIN: 10 INJECTION, EMULSION INTRAVENOUS at 13:02

## 2022-01-01 RX ADMIN — HEPARIN SODIUM 2000 UNITS: 1000 INJECTION INTRAVENOUS; SUBCUTANEOUS at 01:22

## 2022-01-01 RX ADMIN — INSULIN LISPRO 2 UNITS: 100 INJECTION, SOLUTION INTRAVENOUS; SUBCUTANEOUS at 16:00

## 2022-01-01 RX ADMIN — CHLORHEXIDINE GLUCONATE 0.12% ORAL RINSE 15 ML: 1.2 LIQUID ORAL at 08:26

## 2022-01-01 RX ADMIN — NOREPINEPHRINE BITARTRATE 15 MCG/MIN: 1 INJECTION INTRAVENOUS at 08:33

## 2022-01-01 RX ADMIN — PANTOPRAZOLE SODIUM 40 MG: 40 INJECTION, POWDER, FOR SOLUTION INTRAVENOUS at 08:34

## 2022-01-01 RX ADMIN — DEXAMETHASONE SODIUM PHOSPHATE 6 MG: 10 INJECTION INTRAMUSCULAR; INTRAVENOUS at 08:34

## 2022-01-01 RX ADMIN — PROPOFOL 50 MCG/KG/MIN: 10 INJECTION, EMULSION INTRAVENOUS at 08:42

## 2022-01-01 RX ADMIN — LEVETIRACETAM 1000 MG: 100 INJECTION INTRAVENOUS at 19:00

## 2022-01-01 RX ADMIN — FENTANYL CITRATE 200 MCG/HR: 0.05 INJECTION, SOLUTION INTRAMUSCULAR; INTRAVENOUS at 11:01

## 2022-01-01 RX ADMIN — DEXAMETHASONE SODIUM PHOSPHATE 6 MG: 10 INJECTION INTRAMUSCULAR; INTRAVENOUS at 20:53

## 2022-01-01 RX ADMIN — CHLORHEXIDINE GLUCONATE 0.12% ORAL RINSE 15 ML: 1.2 LIQUID ORAL at 20:19

## 2022-01-01 RX ADMIN — CHLORHEXIDINE GLUCONATE 0.12% ORAL RINSE 15 ML: 1.2 LIQUID ORAL at 08:41

## 2022-01-01 RX ADMIN — DEXAMETHASONE SODIUM PHOSPHATE 6 MG: 10 INJECTION INTRAMUSCULAR; INTRAVENOUS at 08:41

## 2022-01-01 RX ADMIN — PROPOFOL 10 MCG/KG/MIN: 10 INJECTION, EMULSION INTRAVENOUS at 22:00

## 2022-01-01 RX ADMIN — PIPERACILLIN SODIUM AND TAZOBACTAM SODIUM 2250 MG: 2; 250 INJECTION, POWDER, FOR SOLUTION INTRAVENOUS at 13:29

## 2022-01-01 RX ADMIN — HEPARIN SODIUM 2000 UNITS: 1000 INJECTION INTRAVENOUS; SUBCUTANEOUS at 07:36

## 2022-01-01 RX ADMIN — Medication 10 ML: at 20:09

## 2022-01-01 RX ADMIN — CHLORHEXIDINE GLUCONATE 0.12% ORAL RINSE 15 ML: 1.2 LIQUID ORAL at 08:10

## 2022-01-01 RX ADMIN — PIPERACILLIN SODIUM AND TAZOBACTAM SODIUM 2250 MG: 2; 250 INJECTION, POWDER, FOR SOLUTION INTRAVENOUS at 23:02

## 2022-01-01 RX ADMIN — SUCCINYLCHOLINE CHLORIDE 100 MG: 20 INJECTION, SOLUTION INTRAMUSCULAR; INTRAVENOUS at 17:47

## 2022-01-01 RX ADMIN — FENTANYL CITRATE 200 MCG/HR: 0.05 INJECTION, SOLUTION INTRAMUSCULAR; INTRAVENOUS at 16:55

## 2022-01-01 ASSESSMENT — PULMONARY FUNCTION TESTS
PIF_VALUE: 25
PIF_VALUE: 28
PIF_VALUE: 24
PIF_VALUE: 33
PIF_VALUE: 46
PIF_VALUE: 34
PIF_VALUE: 32
PIF_VALUE: 24
PIF_VALUE: 35
PIF_VALUE: 27
PIF_VALUE: 26
PIF_VALUE: 35
PIF_VALUE: 24
PIF_VALUE: 27
PIF_VALUE: 26
PIF_VALUE: 27
PIF_VALUE: 24
PIF_VALUE: 30
PIF_VALUE: 25
PIF_VALUE: 41
PIF_VALUE: 27
PIF_VALUE: 26
PIF_VALUE: 33
PIF_VALUE: 35
PIF_VALUE: 25
PIF_VALUE: 25
PIF_VALUE: 27
PIF_VALUE: 32
PIF_VALUE: 39
PIF_VALUE: 28
PIF_VALUE: 29
PIF_VALUE: 28
PIF_VALUE: 10
PIF_VALUE: 27
PIF_VALUE: 33
PIF_VALUE: 38
PIF_VALUE: 46
PIF_VALUE: 24
PIF_VALUE: 34
PIF_VALUE: 25
PIF_VALUE: 35
PIF_VALUE: 27
PIF_VALUE: 27
PIF_VALUE: 24
PIF_VALUE: 34
PIF_VALUE: 25
PIF_VALUE: 25
PIF_VALUE: 34
PIF_VALUE: 25
PIF_VALUE: 34
PIF_VALUE: 26
PIF_VALUE: 41
PIF_VALUE: 24
PIF_VALUE: 50
PIF_VALUE: 45
PIF_VALUE: 30
PIF_VALUE: 27
PIF_VALUE: 28
PIF_VALUE: 24
PIF_VALUE: 36
PIF_VALUE: 33
PIF_VALUE: 39
PIF_VALUE: 46
PIF_VALUE: 25
PIF_VALUE: 27
PIF_VALUE: 29
PIF_VALUE: 27
PIF_VALUE: 28
PIF_VALUE: 27
PIF_VALUE: 26
PIF_VALUE: 26
PIF_VALUE: 25
PIF_VALUE: 36
PIF_VALUE: 33
PIF_VALUE: 31
PIF_VALUE: 26
PIF_VALUE: 41
PIF_VALUE: 30
PIF_VALUE: 26
PIF_VALUE: 28
PIF_VALUE: 27
PIF_VALUE: 43
PIF_VALUE: 31
PIF_VALUE: 45
PIF_VALUE: 28
PIF_VALUE: 27
PIF_VALUE: 29
PIF_VALUE: 27
PIF_VALUE: 33
PIF_VALUE: 29
PIF_VALUE: 35
PIF_VALUE: 25
PIF_VALUE: 32
PIF_VALUE: 30
PIF_VALUE: 42
PIF_VALUE: 28
PIF_VALUE: 25
PIF_VALUE: 26
PIF_VALUE: 24
PIF_VALUE: 26
PIF_VALUE: 33
PIF_VALUE: 34
PIF_VALUE: 34
PIF_VALUE: 28
PIF_VALUE: 25
PIF_VALUE: 28
PIF_VALUE: 46
PIF_VALUE: 44
PIF_VALUE: 33
PIF_VALUE: 34
PIF_VALUE: 31
PIF_VALUE: 25
PIF_VALUE: 42
PIF_VALUE: 25
PIF_VALUE: 27
PIF_VALUE: 28
PIF_VALUE: 27
PIF_VALUE: 37
PIF_VALUE: 29
PIF_VALUE: 24
PIF_VALUE: 34
PIF_VALUE: 42
PIF_VALUE: 30
PIF_VALUE: 26
PIF_VALUE: 24
PIF_VALUE: 24
PIF_VALUE: 32
PIF_VALUE: 28
PIF_VALUE: 26
PIF_VALUE: 34
PIF_VALUE: 24
PIF_VALUE: 53
PIF_VALUE: 24
PIF_VALUE: 35
PIF_VALUE: 33
PIF_VALUE: 51
PIF_VALUE: 24
PIF_VALUE: 28
PIF_VALUE: 34
PIF_VALUE: 28
PIF_VALUE: 35
PIF_VALUE: 61
PIF_VALUE: 31
PIF_VALUE: 27
PIF_VALUE: 27
PIF_VALUE: 29
PIF_VALUE: 24
PIF_VALUE: 34
PIF_VALUE: 27
PIF_VALUE: 30
PIF_VALUE: 27
PIF_VALUE: 34
PIF_VALUE: 24
PIF_VALUE: 35
PIF_VALUE: 24
PIF_VALUE: 36
PIF_VALUE: 22
PIF_VALUE: 26
PIF_VALUE: 27
PIF_VALUE: 36
PIF_VALUE: 31
PIF_VALUE: 33
PIF_VALUE: 43
PIF_VALUE: 53
PIF_VALUE: 33
PIF_VALUE: 26
PIF_VALUE: 25
PIF_VALUE: 34
PIF_VALUE: 49
PIF_VALUE: 33
PIF_VALUE: 26
PIF_VALUE: 29
PIF_VALUE: 42
PIF_VALUE: 34
PIF_VALUE: 28
PIF_VALUE: 26
PIF_VALUE: 27
PIF_VALUE: 32
PIF_VALUE: 27
PIF_VALUE: 27
PIF_VALUE: 28
PIF_VALUE: 27
PIF_VALUE: 26
PIF_VALUE: 28
PIF_VALUE: 24
PIF_VALUE: 36
PIF_VALUE: 43
PIF_VALUE: 25
PIF_VALUE: 27
PIF_VALUE: 35
PIF_VALUE: 43
PIF_VALUE: 28
PIF_VALUE: 25
PIF_VALUE: 33
PIF_VALUE: 34
PIF_VALUE: 25
PIF_VALUE: 43
PIF_VALUE: 32
PIF_VALUE: 28
PIF_VALUE: 32
PIF_VALUE: 28
PIF_VALUE: 29
PIF_VALUE: 28
PIF_VALUE: 27
PIF_VALUE: 34
PIF_VALUE: 27
PIF_VALUE: 26
PIF_VALUE: 34
PIF_VALUE: 59
PIF_VALUE: 30
PIF_VALUE: 35
PIF_VALUE: 33
PIF_VALUE: 33
PIF_VALUE: 27
PIF_VALUE: 28
PIF_VALUE: 33
PIF_VALUE: 27
PIF_VALUE: 35
PIF_VALUE: 37
PIF_VALUE: 33
PIF_VALUE: 26
PIF_VALUE: 28
PIF_VALUE: 31
PIF_VALUE: 24
PIF_VALUE: 29
PIF_VALUE: 29
PIF_VALUE: 36
PIF_VALUE: 35
PIF_VALUE: 34
PIF_VALUE: 24
PIF_VALUE: 48
PIF_VALUE: 31
PIF_VALUE: 41
PIF_VALUE: 24
PIF_VALUE: 27
PIF_VALUE: 33
PIF_VALUE: 32
PIF_VALUE: 24
PIF_VALUE: 26
PIF_VALUE: 30
PIF_VALUE: 25
PIF_VALUE: 41
PIF_VALUE: 26
PIF_VALUE: 31
PIF_VALUE: 42
PIF_VALUE: 26
PIF_VALUE: 25
PIF_VALUE: 33
PIF_VALUE: 35
PIF_VALUE: 26
PIF_VALUE: 35
PIF_VALUE: 28
PIF_VALUE: 30
PIF_VALUE: 34
PIF_VALUE: 32
PIF_VALUE: 27
PIF_VALUE: 25
PIF_VALUE: 34
PIF_VALUE: 27
PIF_VALUE: 32
PIF_VALUE: 34
PIF_VALUE: 48
PIF_VALUE: 46
PIF_VALUE: 27
PIF_VALUE: 26
PIF_VALUE: 34
PIF_VALUE: 28
PIF_VALUE: 27
PIF_VALUE: 30
PIF_VALUE: 29
PIF_VALUE: 29
PIF_VALUE: 28
PIF_VALUE: 40
PIF_VALUE: 33
PIF_VALUE: 26
PIF_VALUE: 28
PIF_VALUE: 28
PIF_VALUE: 39
PIF_VALUE: 32
PIF_VALUE: 34
PIF_VALUE: 72
PIF_VALUE: 26
PIF_VALUE: 29
PIF_VALUE: 33
PIF_VALUE: 31
PIF_VALUE: 28
PIF_VALUE: 28
PIF_VALUE: 33
PIF_VALUE: 49
PIF_VALUE: 47
PIF_VALUE: 27
PIF_VALUE: 29
PIF_VALUE: 59
PIF_VALUE: 33
PIF_VALUE: 24
PIF_VALUE: 36
PIF_VALUE: 36
PIF_VALUE: 32
PIF_VALUE: 26
PIF_VALUE: 57
PIF_VALUE: 30
PIF_VALUE: 25
PIF_VALUE: 42
PIF_VALUE: 48
PIF_VALUE: 27
PIF_VALUE: 28
PIF_VALUE: 47
PIF_VALUE: 25
PIF_VALUE: 38
PIF_VALUE: 30
PIF_VALUE: 25
PIF_VALUE: 25
PIF_VALUE: 26
PIF_VALUE: 28
PIF_VALUE: 33
PIF_VALUE: 28
PIF_VALUE: 24
PIF_VALUE: 29
PIF_VALUE: 49
PIF_VALUE: 25
PIF_VALUE: 42
PIF_VALUE: 33
PIF_VALUE: 39
PIF_VALUE: 28
PIF_VALUE: 31
PIF_VALUE: 32
PIF_VALUE: 27
PIF_VALUE: 34
PIF_VALUE: 34

## 2022-01-01 ASSESSMENT — PAIN SCALES - GENERAL: PAINLEVEL_OUTOF10: 0

## 2022-01-24 PROBLEM — I46.9 CARDIAC ARREST (HCC): Status: ACTIVE | Noted: 2022-01-01

## 2022-01-24 NOTE — ED TRIAGE NOTES
Pt arrived to ER, citizens stated achieving rosc. Pt had LMA size 4 in place, IO in lt tibia, IV lAC. Pt received 3 rounds of epi from [facility] and was placed on the chuck. Dalia Casianoes in place but not needed. Pt given 2mg [email or web address], Pt given 100 succinylcholine @1747. Family reports a fall, teeth broken.

## 2022-01-25 NOTE — CONSULTS
Renal consult dictated   Thank you  Dialysis done last night  Will repeat in morning  Poor prognosis

## 2022-01-25 NOTE — PROGRESS NOTES
Comprehensive Nutrition Assessment    Type and Reason for Visit:  Initial (Mechanical ventilation)    Nutrition Recommendations/Plan:   Monitor ability to start TF    Nutrition Assessment:  S/P cardiac arrest, currently on hypothermic protocol. Unable to determine nutrition status pta, at nutritional risk due to inability to take po due to mechanical ventilation. Will monitor for ability to start TF    Malnutrition Assessment:  Malnutrition Status:  Insufficient data    Context:  Chronic Illness     Findings of the 6 clinical characteristics of malnutrition:  Energy Intake:  Unable to assess  Weight Loss:  Unable to assess     Body Fat Loss:  Unable to assess     Muscle Mass Loss:  Unable to assess    Fluid Accumulation:        Strength:  Not Performed    Estimated Daily Nutrient Needs:  Energy (kcal):  7864-1380 (kg x 20-22); Weight Used for Energy Requirements:  Admission (72.6 kg)     Protein (g):   gm (kg IBW x 1.2-1.5); Weight Used for Protein Requirements:  Ideal (67.2 kg)        Fluid (ml/day): As per MD; Method Used for Fluid Requirements:  Standard Renal      Nutrition Related Findings:  PMH: DM2, CHF, COPD, CAD, Labs: Na+ 132, K+ 5.4, BUN/CR: 43/6.26, glucose (139-161), +HD MWF, generalized edema noted. OGT in place, propofol @ 21.8 ml/hr (575 kcal), CVC line, BM 1/24    Wounds:  None       Current Nutrition Therapies:    Diet NPO    Anthropometric Measures:  · Height: 5' 7\" (170.2 cm)  · Current Body Weight: 160 lb (72.6 kg) (adm)   · Admission Body Weight: 160 lb (72.6 kg)    · Usual Body Weight: 160 lb (72.6 kg) (stated)     · Ideal Body Weight: 148 lbs; % Ideal Body Weight  > 100%   · BMI: 25.1  · BMI Categories: Overweight (BMI 25.0-29. 9)       Nutrition Diagnosis:   · Inadequate oral intake related to impaired respiratory function as evidenced by intubation    Nutrition Interventions:   Food and/or Nutrient Delivery:  Continue NPO (Monitor ability to start TF)  Nutrition Education/Counseling:  Education not indicated   Coordination of Nutrition Care:  Continue to monitor while inpatient    Goals:  ability to start TF       Nutrition Monitoring and Evaluation:   Food/Nutrient Intake Outcomes:  Enteral Nutrition Intake/Tolerance  Physical Signs/Symptoms Outcomes:  Biochemical Data,Fluid Status or Edema,Hemodynamic Status,Weight     Electronically signed by Kendrick Treadwell RD, LD on 1/25/22 at 4:10 PM EST

## 2022-01-25 NOTE — PROGRESS NOTES
Full note is dictated. ICD when tested: no reports of VF. Now I do have the EMS squad report, I do not see VF documented, I see no reports of shocks delivered, it sounds like it was respiratory arrest and collapse, not VF. Presently I a, on the phone with a member of the EMS ( he was not present) but he is trying to get the person present yesterday on the phone to ask. So far it does not seem that the Pt was in VFib, and no shocks were delivered. We are waiting for confirmation from the squad person involved with the case yesterday.

## 2022-01-25 NOTE — CONSULTS
[facility] Neurology Consult Note  Name: Debra Smith  Age: 68 y.o. Gender: male  Chief Complaint:Cardiac Arrest    Primary Care Provider: Gilford Abts, MD  Admission Date: 1/24/2022      HPI: 45-year-old gentleman who was admitted with cardiac arrest at home yesterday. He is still undergoing hypothermia protocol. According to chart, patient and daughter the patient was down about 7-15 minutes prior to EMS arrival.  His wife gave him oxygen but did not initiate CPR. When EMS arrived he was pulseless and in V. fib. He got to amps of epinephrine and received 1 shock and pulse was recovered [ROSC]. He had an LMA inserted. In the emergency room he had some seizure activity with approximately 3 seconds of right upward gaze and head deviation, recurrent. I reviewed a video of this. He was given levetiracetam and continued on this medication. Patient was hyperkalemic and may have missed a dialysis. He has paroxysmal atrial fibrillation and is on Eliquis. He was previously living at home with his wife. He does not have a personal history of seizures, head trauma, febrile seizures, or brain infection. He received a dose of Ativan this morning due to increased twitching activity in the eyes. No full body clonus. He is still on propofol, fentanyl, and received Ativan this morning.      Review of Systems   Unable to perform ROS: Intubated       Patient Active Problem List   Diagnosis    Hypertension    COPD (ch[facility] disease) ([facility] [address]    Benign prostatic hyperplasia    CAD (coronary artery disease)    Type II or unspecified type diabetes mellitus without mention of complication, not stated as uncontrolled    Cardiomyopathy ([address]    Hyperlipidemia    Neuropathy    CKD (chronic kidney disease) stage 3, GFR 30-59 ml/min ([facility])    Tobacco abuse    Diabetic neuropathy ([address]    Cardiac arrest [facility])       Past Medical History:   Diagnosis Date    BPH (benign prostatic hypertrophy)  CAD (coronary artery disease)     sees Dr. Mag Williamson in [place] annually    Cardiomyopathy [facility])     CHF (congestive heart failure) ([facility] [address]     LVEF 40% on echo 6/2014    COPD (chronic obstructive pulmonary disease) ([facility] [address]     has O2 rarely uses, has nebulizer    Hypertension     On home oxygen therapy     Type II or unspecified type diabetes mellitus without mention of complication, not stated as uncontrolled        Medications:  Reviewed    Infusion Medications:    fentaNYL (SUBLIMAZE) infusion 200 mcg/hr (01/25/22 1214)    heparin (PORCINE) Infusion 12 Units/kg/hr (01/25/22 9090)    dextrose      norepinephrine (LEVOPHED) infusion 30 mcg/min (01/25/22 1209)    vasopressin (Septic Shock) infusion 0.04 Units/min (01/25/22 7217)    propofol 50 mcg/kg/min (01/25/22 1210)    sodium chloride       Scheduled Medications:    vancomycin (VANCOCIN) intermittent dosing (placeholder)   Other RX Placeholder    norepinephrine        levetiracetam  500 mg IntraVENous Daily    pantoprazole  40 mg IntraVENous Daily    And    sodium chloride (PF)  10 mL IntraVENous Daily    insulin lispro  0-12 Units SubCUTAneous Q4H    sennosides-docusate sodium  2 tablet Oral BID    sodium chloride flush  5-40 mL IntraVENous 2 times per day    piperacillin-tazobactam  2,250 mg IntraVENous Q8H    dexamethasone  6 mg IntraVENous Q12H    chlorhexidine  15 mL Mouth/Throat BID     PRN Meds: heparin (porcine), heparin (porcine), LORazepam, glucose, dextrose, glucagon (rDNA), dextrose, sodium chloride flush, sodium chloride, ondansetron **OR** ondansetron, polyethylene glycol, acetaminophen **OR** acetaminophen    No Known Allergies        Family History   Problem Relation Age of Onset    Lung Cancer Mother     Seizures Other        No past surgical history on file.          Social History     Tobacco History     Smoking Status  Current Every Day Smoker Smoking Frequency  1 pack/day for 45 years (39 pk yrs)    Smokeless Tobacco Use  Never Used          Alcohol History     Alcohol Use Status  Yes Comment  OCCASIONAL          Drug Use     Drug Use Status  No          Sexual Activity     Sexually Active  Not Asked                  PHYSICAL EXAM:     BP (!) 145/63   Pulse 66   Temp 96.6 °F (35.9 °C) (Bladder)   Resp 20   Ht 5' 7\" (1.702 m)   Wt 160 lb (72.6 kg)   SpO2 100%   BMI 25.06 kg/m²         Neuro:     Patient was examined on fentanyl and propofol. Pupils were 2 mm and reactive to light. Absent corneals but this did bring out twitching of eyelids in a nonprogressive or Jacksonian manner. No response to central peripheral pain. Toes were upgoing. Upper extremity reflexes were ones at biceps triceps brachioradialis. Patellar jerks were absent but this is likely a positional issue. .  Ankle jerks were 1 and present. Tone was decreased x4 limbs. Labs:   Recent Labs     01/24/22  1800 01/24/22 1913 01/25/22 0109 01/25/22  0648 01/25/22  0820   WBC 13.9*  --   --  23.7*  --    HGB 9.4*   < > 10.1* 9.3* 9.7*   HCT 30.7*  --   --  29.4*  --      --   --  173  --     < > = values in this interval not displayed. Recent Labs     01/24/22  1800 01/24/22 1913 01/25/22 0116 01/25/22  0734 01/25/22  0820   *  --  135 131*  --    K 6.6*  --  4.5 5.6*  --    CL 94*  --  93* 90*  --    CO2 22  --  28 24  --    BUN 55*  --  32* 39*  --    CREATININE 8.62*   < > 5.01* 6.04* 6.3*   CALCIUM 8.7  --  8.8 8.5  --    PHOS  --   --  4.5 4.9*  --     < > = values in this interval not displayed.      Recent Labs     01/24/22 1800   AST 21   ALT 18   BILITOT 0.4   ALKPHOS 107*     Recent Labs     01/24/22  1800 01/25/22  0100 01/25/22  0647   INR 1.5 1.4 1.9     Recent Labs     01/24/22 1800 01/25/22  0116 01/25/22  0830   CKTOTAL  --   --  467*   TROPONINI 0.130* 0.197*  --        Urinalysis:   No results found for: Darrelyn Meals, BACTERIA, RBCUA, BLOODU, [address], [facility] [address]    Radiology:  [unfilled]    [facility] recent    EEG No valid procedures specified. MRI of Brain No results found for this or any previous visit. No results found for this or any previous visit. MRA of the Head and Neck: No results found for this or any previous visit. No results found for this or any previous visit. No results found for this or any previous visit. CT of the Head: Results for orders placed during the hospital encounter of 01/24/22    CT Head WO Contrast    Narrative  EXAMINATION: CT of the brain without contrast    HISTORY: Cardiac arrest. Fall. COMPARISON: None available    TECHNIQUE: Multiple contiguous axial images were obtained of the brain from the skull base through the vertex. Multiplanar reformats were obtained. FINDINGS:    Prominence of the sulci and ventricles compatible with mild generalized parenchymal volume loss. Areas of bilateral supratentorial white matter hypoattenuation are nonspecific but most likely related to chronic small vessel ischemic changes in a patient  of this age. Gray-white matter differentiation is preserved. No acute hemorrhage or abnormal extra-axial fluid collection. No mass effect or midline shift. The visualized paranasal sinuses and mastoid air cells are clear. Calvarium is intact. Endotracheal tube and orogastric tube partially visualized. Impression  No acute intracranial process. Generalized parenchymal volume loss and nonspecific white matter findings most compatible with chronic small vessel ischemic changes in a patient of this age. All CT scans at this facility use dose modulation, iterative reconstruction, and/or weight based dosing when appropriate to reduce radiation dose to as low as reasonably achievable. No results found for this or any previous visit. No results found for this or any previous visit. Carotid duplex: No results found for this or any previous visit.   No results found for this or any previous visit. No results found for this or any previous visit. Echo No results found for this or any previous visit. Assessment/Plan:    Chart, labs,and relevant images reviewed. Specifically his CT images were reviewed which did not show any loss of gray-white differentiation. This 80-year-old gentleman is post arrest.  He has not been feeling well for about 2 weeks and missed a dialysis session. He is also now been found to have COVID-19. Downtime was not fully clear as he retained ROSC in the field, but was likely less than 30 minutes. Initial rhythm was a shockable one. In order to prognosticate we will await for the patient to be out of hypothermia protocol and off his sedation agents. I will order an EEG with reactivity protocol. We will likely order an MRI within the next few days for prognostication although this is not an acute need today. For his seizures, I will continue Keppra at a reduced dose than usual in the context of his renal failure. Total time in critical care in the ICU was 40 minutes, directly in this patient's care.      Electronically signed by Haylee Wray MD on 1/25/2022 at 12:56 PM

## 2022-01-25 NOTE — PLAN OF CARE
Nutrition Problem #1: Inadequate oral intake  Intervention: Food and/or Nutrient Delivery: Continue NPO (Monitor ability to start TF)  Nutritional Goals: ability to start TF

## 2022-01-25 NOTE — FLOWSHEET NOTE
rounding in Er when a Full Arrest came in, Daughter of the pt was in the waiting area and went out and provided spiritual care support to her while her father was in the trauma room, daughter was the only family member present during the encounter, prayed with the daughter for her father and for her mother who was at home, informed DR  that the daughter was in the waiting area, Daughter had a family friend Kathi Renita by her side to also provide comfort while the daughter waiting on the news about her father, Daughter was able to see her father before he was transferred to ICU,

## 2022-01-25 NOTE — PROGRESS NOTES
Bedside device check completed at this time. Pt has a Bi-V ICD [employer]. Presenting EGM displays AP/BVP in DDDR mode. Pt was admitted yesterday for cardiac arrest, there is NO events/shocks/issues to report from 1/22/22-1/25/22. Pt had 32 VTns from 10/27/21-1/21/22 AF burden 2.7% with 479 AT/AF episodes since 6/9/21. BVP 95% AP<1%. Battery has 1.6 years, RA/RV/LV lead impedance , sensing and capture displaying satisfactory results. This pt does not follow with [facility] or [address] [place] [address]. Last check was 6/9/21. Paper copy of report faxed to ICU. ICU Physician updated.

## 2022-01-25 NOTE — PROGRESS NOTES
Pharmacy Note  Vancomycin Consult    Wilfredo Evans is a 68 y.o. male started on Vancomycin for aspiration pneumonia; consult received from Dr. Blas Villanueva to manage therapy. Also receiving the following antibiotics: Zosyn. Cardiac arrest ([facility] [I46.9]  Hyperkalemia [E87.5]  Seizure ([facility] [facility] [R56.9]  Injury of head, initial encounter [S09.90XA]  Chronic renal failure syndrome, stage 5 (HCC) [N18.5]  Fracture of tooth (traumatic), initial encounter for closed fracture [S02. 5XXA]  Allergies: Patient has no known allergies. Temp max: 93.3 *F    Cultures  Recent Labs     01/24/22  1937   COVID19 DETECTED*     Height: 5' 7\" (170.2 cm), Weight: 160 lb (72.6 kg), Body mass index is 25.06 kg/m². Recent Labs     01/25/22  0109   CREATININE 5.3*   Estimated Creatinine Clearance: 11 mL/min (A) (based on SCr of 5.3 mg/dL [facility])). Hemodialysis Intake (ml): 400 mlDialyzer Clearance: Lightly streaked. Goal Trough Level: 15 mcg/mL    Assessment/Plan:  Will initiate Vancomycin with a one time dose of 1,000 mg. Further doses and labs to be ordered once dialysis is scheduled. Timing of future trough levels may be adjusted based on culture results, renal function, and clinical response. Thank you for the consult. Will continue to follow.   Anabel Torres, CorettaD [facility] 1/25/2022 1:42 AM

## 2022-01-25 NOTE — CONSULTS
Renal consult dictated  artic sun     ESRDX fistula  Hypoxia decorticate  Hypotension  Levo/vaso on board  OHDx HF AICD   Hypertension  COPd    Plan jerking continously  Neuro to see  Dialysis tomomorow

## 2022-01-25 NOTE — CONSULTS
Inpatient consult to Pulmonology  Consult performed by: Antoinette Garcia MD  Consult ordered by: Crisys Rubio MD             Admit Date: 1/24/2022    PCP:  Tea Lee MD     CHIEF COMPLAINT / HPI:                The patient is a 68 y.o. male with significant past medical history of coronary artery disease and congestive heart failure and chronic obstructive pulmonary disease on home oxygen who presented to the ER with cardiac arrest.   This was witnessed by the daughter. Patient was complaining of shortness of breath in the last few days. She suddenly passed out. Daughter believes it took 15 to 18 minutes before EMS arrived. He was found to be in V. fib. He received 1 shock and 3 doses of epi. He was started on pressors on arrival to ER. Central line was placed in right IJ but this was lost on the transfer to the ICU. Femoral central line was placed in the ICU. He is currently on Levophed and vasopressin. He is on IV heparin as well. He is currently being cooled. Past Medical History:      Diagnosis Date    BPH (benign prostatic hypertrophy)     CAD (coronary artery disease)     sees Dr. Mariana Tinsley in [place] annually    Cardiomyopathy [facility])     CHF (congestive heart failure) ([facility] [address]     LVEF 40% on echo 6/2014    COPD (chronic obstructive pulmonary disease) ([facility] [address]     has O2 rarely uses, has nebulizer    Hypertension     On home oxygen therapy     Type II or unspecified type diabetes mellitus without mention of complication, not stated as uncontrolled         Past Surgical History:    No past surgical history on file.     Current Medications:     vancomycin (VANCOCIN) intermittent dosing (placeholder)   Other RX Placeholder    norepinephrine        sodium chloride flush  5-40 mL IntraVENous 2 times per day    heparin (porcine)  5,000 Units SubCUTAneous 3 times per day    piperacillin-tazobactam  2,250 mg IntraVENous Q8H    dexamethasone  6 mg IntraVENous Q12H    chlorhexidine  15 mL Mouth/Throat BID    norepinephrine         Home Meds:  Prior to Admission medications    Medication Sig Start Date End Date Taking? Authorizing Provider   allopurinol (ZYLOPRIM) 100 MG tablet Take 100 mg by mouth daily 10/18/21  Yes Historical Provider, MD   apixaban (ELIQUIS) 5 MG TABS tablet Take 5 mg by mouth 2 times daily 12/21/21  Yes Historical Provider, MD   ascorbic acid (VITAMIN C) 1000 MG tablet Take 1,000 mg by mouth daily   Yes Historical Provider, MD   vitamin D3 (CHOLECALCIFEROL) 125 MCG (5000 UT) TABS tablet Take 5,000 Units by mouth daily   Yes Historical Provider, MD   colchicine (COLCRYS) 0.6 MG tablet Take 0.6 mg by mouth daily   Yes Historical Provider, MD   digoxin (LANOXIN) 125 MCG tablet Take 0.125 mg by mouth Take tues thurs sat 9/11/21  Yes Historical Provider, MD   ferrous sulfate (IRON 325) 325 (65 Fe) MG tablet Take 325 mg by mouth daily (with breakfast)   Yes Historical Provider, MD   finasteride (PROSCAR) 5 MG tablet  10/18/21  Yes Historical Provider, MD   pantoprazole (PROTONIX) 40 MG tablet Take 40 mg by mouth 12/21/21 12/16/22 Yes Historical Provider, MD   sacubitril-valsartan (ENTRESTO) 24-26 MG per tablet Take 1 tablet by mouth 2 times daily   Yes Historical Provider, MD   carvedilol (COREG) 3.125 MG tablet Take 3.125 mg by mouth 2 times daily (with meals)   Yes Historical Provider, MD   aspirin 81 MG EC tablet Take 81 mg by mouth daily   Yes Historical Provider, MD   simvastatin (ZOCOR) 40 MG tablet Take 1 tablet by mouth every evening 1/19/17  Yes Lesly Brown MD   tamsulosin [facility]) 0.4 MG capsule Take 1 capsule by mouth daily 1/17/17  Yes Lesly Brown MD   Garrido [employer] 18 MCG inhalation capsule  1/29/15  Yes Historical Provider, MD LR  (90 BASE) MCG/ACT inhaler  10/23/13  Yes Historical Provider, MD   Tiotropium Bromide Monohydrate ([employer]) Inhale  into the lungs.      Yes Historical Provider, MD   fluticasone-salmeterol (ADVAIR DISKUS) 250-50 MCG/DOSE AEPB Inhale 1 puff into the lungs every 12 hours. Yes Historical Provider, MD   sildenafil (VIAGRA) 100 MG tablet take 1 tablet by mouth as needed for erectile dysfunction 18   Sawyer Lomeli MD   metFORMIN (GLUCOPHAGE) 1000 MG tablet TAKE 1 TABLET TWICE A DAY WITH MEALS 16   Sawyer Lomeli MD   furosemide (LASIX) 40 MG tablet  14   Historical Provider, MD   NITROSTAT 0.4 MG SL tablet  2/19/15   Historical Provider, MD       Allergies:  Patient has no known allergies. Social History:      reports that he has been smoking. He has a 45.00 pack-year smoking history. He has never used smokeless tobacco. He reports current alcohol use. He reports that he does not use drugs. TOBACCO:   reports that he has been smoking. He has a 45.00 pack-year smoking history. He has never used smokeless tobacco.     ETOH:   reports current alcohol use. Family History:   family history includes Lung Cancer in his mother; Seizures in an other family member. Review of Systems  Complete review of systems done and negative unless otherwise noted positive. Objective:     PHYSICAL EXAM:      VITALS:  BP (!) 145/63   Pulse 66   Temp 96.6 °F (35.9 °C) (Bladder)   Resp 20   Ht 5' 7\" (1.702 m)   Wt 160 lb (72.6 kg)   SpO2 100%   BMI 25.06 kg/m²   24HR INTAKE/OUTPUT:      Intake/Output Summary (Last 24 hours) at 2022 0759  Last data filed at 2022 5519  Gross per 24 hour   Intake 2279.65 ml   Output 1005 ml   Net 1274.65 ml     CURRENT PULSE OXIMETRY:  SpO2: 100 %  24HR PULSE OXIMETRY RANGE:  SpO2  Av.3 %  Min: 58 %  Max: 100 %    General appearance -ill-appearing  Mental status-  intubated and sedated  Eyes - pupils equal and reactive, extraocular eye movements intact  Nose - normal and patent  Mouth-ET tube in place.   Neck - supple, no significant adenopathy  Chest - clear to auscultationsymmetric air entry  Heart -, regular rhythm, normal rate, S1, S2, no murmurs, rubs, clicks or gallops  Abdomen - soft, nontender, nondistended, no masses or organomegaly  Rectal - deferred, not clinically indicated  Neurological -intubated and sedated. Musculoskeletal - no joint tenderness, deformity or swelling  Extremities - peripheral pulses normal, no pedal edema, no clubbing or cyanosis  Skin - normal coloration and turgor, no rashes          DATA:    CBC:   Recent Labs     01/24/22 1800 01/24/22  1800 01/24/22 1913 01/25/22 0109 01/25/22  0648   WBC 13.9*  --   --   --  23.7*   HGB 9.4*   < > 9.2* 10.1* 9.3*   HCT 30.7*  --   --   --  29.4*     --   --   --  173    < > = values in this interval not displayed. BMP:    Recent Labs     01/24/22 1800 01/24/22 1913 01/25/22 0109 01/25/22 0116 01/25/22  0734   *  --   --  135 131*   K 6.6*  --   --  4.5 5.6*   CL 94*  --   --  93* 90*   CO2 22  --   --  28 24   BUN 55*  --   --  32* 39*   CREATININE 8.62*   < > 5.3* 5.01* 6.04*   GLUCOSE 151*  --   --  139* 188*   CALCIUM 8.7  --   --  8.8 8.5   MG 3.4*  --   --  2.4 2.2   PHOS  --   --   --  4.5 4.9*    < > = values in this interval not displayed. HEPATIC:   Recent Labs     01/24/22  1800   AST 21   ALT 18   BILITOT 0.4   ALKPHOS 107*     LACTATE:   Recent Labs     01/25/22 0116 01/25/22  0649   LACTA 3.0* 1.7     PROCALCITONIN: No results for input(s): PROCAL in the last 72 hours. TROPONIN:   Recent Labs     01/24/22 1800 01/25/22 0116   TROPONINI 0.130* 0.197*        Recent Labs     01/24/22 1800 01/25/22  0100 01/25/22  0647   INR 1.5 1.4 1.9          Radiology Review:    CXR portable: Results for orders placed during the hospital encounter of 10/27/21    XR CHEST PORTABLE    Narrative  COMPARISON: October 19, 2016  HISTORY: cp    TECHNIQUE: AP view      FINDINGS:  A left-sided AICD is seen in place. Leads are seen projecting over the right atrium and right ventricle.  Multiple lines and leads project over the chest. No consolidating pneumonia, pleural effusion or pneumothorax is seen. The cardiac silhouette is  mildly enlarged. . The bony structures are grossly intact. Impression  No evidence of acute cardiopulmonary process       Assessment/Plan       Impression:    -CP arrest, initial rhythm was V. tach with ROSC. -Ventricular tachycardia  -Acute respiratory failure requiring mechanical ventilation. -COVID-19 pneumonia.  -Circulatory shock. Currently on dual pressors.  -Lactic acidosis due to hypoperfusion. This has resolved. -Hyperkalemia, this has improved. -Mild hyponatremia.  -Leukocytosis, likely reactive. Consider aspiration pneumonitis.  -Anemia, chronic. Recommendations:    -Continue current care in the ICU for hemodynamic and airway monitoring.  -Continue ventilator bundle. Is currently on 60% FiO2.  -For COVID-19 pneumonia, continue steroids. He is not a candidate for remdesivir or baricitinib.  -Continue anticoagulation. He has been on Eliquis before this. Less likely VTE.  -Continue vasopressors. Goal map above 65.  -Continue hypothermia protocol.  -Reasonable to cover for aspiration with antibiotic.  -Dialysis per nephrology.  -Interrogation of his defibrillator.  -Poor prognosis. Downtime seems to be high in this secretion at baseline. Will consult palliative care and consult neurology after  Rewarming. Full Code    Excluding procedures, the total critical care time caring for this patient with life threatening, unstable organ failure, including direct patient contact, review of medical record, management of life support systems, review of data including imaging and labs, discussions with other team members, patient's family and physicians at least 31 minutes so far today.      Electronically signed by Tara Adamson MD on 1/25/2022 at 7:59 AM

## 2022-01-25 NOTE — PROGRESS NOTES
Tx complete, sites held for hemostasis, pt stable. 500ml removed, patient had low Bps (see charting).   Primary Garrick Hayes notified

## 2022-01-25 NOTE — PROGRESS NOTES
Received consult  I called dialysis nurse at [facility]  Patient sees Dr. Lawrence Cottrell (doesn't come to [facility]). Pt was supposed to come to HD this am but wasn't feeling well so rescheduled for tomorrow.   Last hd was Friday.  k 6.6-7.1 with full cardiac arrest    - will do stat HD tonight  - full consult in am

## 2022-01-25 NOTE — PROCEDURES
Patient Name: Daxa Claudio   Medical Record Number: [id number]  Date: 1/24/2022   Time: 9:21 PM   Room/Bed: [place] [address]  Central Line Placement Procedure Note  Indication: vascular access, poor peripheral access, hypovolemia, long term access, central venous monitoring, centrally administered medications and need for frequent blood draws    Consent: Unable to be obtained due to the emergent nature of this procedure. Procedure: The patient was positioned appropriately and the skin over the right femoral vein was prepped with betadine and draped in a sterile fashion. Local anesthesia was not performed due to the emergent nature of this procedure. A large bore needle was used to identify the vein. A guide wire was then inserted into the vein through the needle. A triple lumen catheter was then inserted into the vessel over the guide wire using the Seldinger technique. All ports showed good, free flowing blood return and were flushed with saline solution. The catheter was then securely fastened to the skin with sutures and covered with a sterile dressing. A post procedure X-ray was not indicated. The patient tolerated the procedure well.     Complications: None  Supervised by Dr. Linda Merritt MD    Electronically Signed by: Electronically signed by ESTHER Muñoz CNP on 1/24/2022 at 9:23 PM

## 2022-01-25 NOTE — CONSULTS
Consults    Patient Name: Edson Claudio Date: 2022  5:43 PM  MR #: [id number]  : 1945    Attending Physician: Nikita Desouza MD  Reason for consult: CPA    History of Presenting Illness:      Layman Patient is a 68 y.o. male on hospital day 1 with a history of . History Obtained From:  patient, electronic medical record    Pt admitted with witnessed Cardiac Arrest by daughter. Daughter states pt was down near 15 minutes prior to squad arrival. Upon arrival pt received 3 shocks. For past 2 weeks or so pt has not felt well. Presents in MINAL Pr is unresponsive. Pt has h/o CAD with prior stents(s) with [facility]. Anatomy not available to me at this time. He does have [facility] BIV- ICD followed at [facility]. Iv Amiodarone given overnight. He has PAF and has been on Eliquis. He has ha overnight Seizure activity and Keppra has been given. Family is in the room. Device interrogation does not reveal associated Ventricular arrhythmia nor episodes of ATP/Shock. History:      EKG: V paced with underlying AF  Past Medical History:   Diagnosis Date    BPH (benign prostatic hypertrophy)     CAD (coronary artery disease)     sees Dr. Brenna Dance in [place] annually    Cardiomyopathy [facility])     CHF (congestive heart failure) ([facility]     LVEF 40% on echo 2014    COPD (chronic obstructive pulmonary disease) ([facility]     has O2 rarely uses, has nebulizer    Hypertension     On home oxygen therapy     Type II or unspecified type diabetes mellitus without mention of complication, not stated as uncontrolled      No past surgical history on file.     Family History  Family History   Problem Relation Age of Onset    Lung Cancer Mother     Seizures Other      [] Unable to obtain due to ventilated and/ or neurologic status    Social History     Socioeconomic History    Marital status:      Spouse name: Not on file    Number of children: Not on file    Years of education: Not on file    Highest education level: Not on file   Occupational History    Not on file   Tobacco Use    Smoking status: Current Every Day Smoker     Packs/day: 1.00     Years: 45.00     Pack years: 45.00    Smokeless tobacco: Never Used   Substance and Sexual Activity    Alcohol use: Yes     Comment: OCCASIONAL    Drug use: No    Sexual activity: Not on file   Other Topics Concern    Not on file   Social History Narrative    Not on file     Social Determinants of Health     Financial Resource Strain:     Difficulty of Paying Living Expenses: Not on file   Food Insecurity:     Worried About Running Out of Food in the Last Year: Not on file    Nayan of Food in the Last Year: Not on file   Transportation Needs:     Lack of Transportation (Medical): Not on file    Lack of Transportation (Non-Medical):  Not on file   Physical Activity:     Days of Exercise per Week: Not on file    Minutes of Exercise per Session: Not on file   Stress:     Feeling of Stress : Not on file   Social Connections:     Frequency of Communication with Friends and Family: Not on file    Frequency of Social Gatherings with Friends and Family: Not on file    Attends [employer]ism Services: Not on file    Active Member of [address] [place] [address] or Organizations: Not on file    Attends Club or Organization Meetings: Not on file    Marital Status: Not on file   Intimate Partner Violence:     Fear of Current or Ex-Partner: Not on file    Emotionally Abused: Not on file    Physically Abused: Not on file    Sexually Abused: Not on file   Housing Stability:     Unable to Pay for Housing in the Last Year: Not on file    Number of [place] in the Last Year: Not on file    Unstable Housing in the Last Year: Not on file      [] Unable to obtain due to ventilated and/ or neurologic status      Home Medications:      Medications Prior to Admission: allopurinol (ZYLOPRIM) 100 MG tablet, Take 100 mg by mouth daily  apixaban (ELIQUIS) 5 MG TABS tablet, Take 5 mg by mouth 2 times daily  ascorbic acid (VITAMIN C) 1000 MG tablet, Take 1,000 mg by mouth daily  vitamin D3 (CHOLECALCIFEROL) 125 MCG (5000 UT) TABS tablet, Take 5,000 Units by mouth daily  colchicine (COLCRYS) 0.6 MG tablet, Take 0.6 mg by mouth daily  digoxin (LANOXIN) 125 MCG tablet, Take 0.125 mg by mouth Take tues thurs sat  ferrous sulfate (IRON 325) 325 (65 Fe) MG tablet, Take 325 mg by mouth daily (with breakfast)  finasteride (PROSCAR) 5 MG tablet,   pantoprazole (PROTONIX) 40 MG tablet, Take 40 mg by mouth  sacubitril-valsartan (ENTRESTO) 24-26 MG per tablet, Take 1 tablet by mouth 2 times daily  carvedilol (COREG) 3.125 MG tablet, Take 3.125 mg by mouth 2 times daily (with meals)  aspirin 81 MG EC tablet, Take 81 mg by mouth daily  simvastatin (ZOCOR) 40 MG tablet, Take 1 tablet by mouth every evening  tamsulosin (FLOMAX) 0.4 MG capsule, Take 1 capsule by mouth daily  SPIRIVA HANDIHALER 18 MCG inhalation capsule,   VENTOLIN  (90 BASE) MCG/ACT inhaler,   Tiotropium Bromide Monohydrate (SPIRIVA HANDIHALER IN), Inhale  into the lungs. fluticasone-salmeterol (ADVAIR DISKUS) 250-50 MCG/DOSE AEPB, Inhale 1 puff into the lungs every 12 hours. [DISCONTINUED] guaiFENesin (ROBITUSSIN) 100 MG/5ML syrup, Take 200 mg by mouth 3 times daily as needed  sildenafil (VIAGRA) 100 MG tablet, take 1 tablet by mouth as needed for erectile dysfunction  [DISCONTINUED] ticagrelor (BRILINTA) 90 MG TABS tablet, Take 1 tablet by mouth 2 times daily  metFORMIN (GLUCOPHAGE) 1000 MG tablet, TAKE 1 TABLET TWICE A DAY WITH MEALS  furosemide (LASIX) 40 MG tablet,   NITROSTAT 0.4 MG SL tablet,   [DISCONTINUED] clotrimazole-betamethasone (LOTRISONE) cream, Apply topically 2 times daily to feet including in between toes for 2 weeks  [DISCONTINUED] enalapril (VASOTEC) 20 MG tablet, Take 20 mg by mouth daily. [DISCONTINUED] tamsulosin (FLOMAX) 0.4 MG capsule, Take 0.4 mg by mouth daily.     [DISCONTINUED] atorvastatin (LIPITOR) 20 MG tablet, Take 20 mg by mouth daily. [DISCONTINUED] aspirin 325 MG tablet, Take 325 mg by mouth daily. [DISCONTINUED] carvedilol (COREG) 25 MG tablet, Take 3.125 mg by mouth 2 times daily (with meals)   [DISCONTINUED] aliskiren (TEKTURNA) 300 MG tablet, Take 300 mg by mouth daily. Current Hospital Medications:     Scheduled Meds:   vancomycin (VANCOCIN) intermittent dosing (placeholder)   Other RX Placeholder    norepinephrine        levetiracetam  500 mg IntraVENous Daily    pantoprazole  40 mg IntraVENous Daily    And    sodium chloride (PF)  10 mL IntraVENous Daily    insulin lispro  0-12 Units SubCUTAneous Q4H    sennosides-docusate sodium  2 tablet Oral BID    sodium chloride flush  5-40 mL IntraVENous 2 times per day    piperacillin-tazobactam  2,250 mg IntraVENous Q8H    dexamethasone  6 mg IntraVENous Q12H    chlorhexidine  15 mL Mouth/Throat BID     Continuous Infusions:   fentaNYL (SUBLIMAZE) infusion 75 mcg/hr (01/25/22 1603)    heparin (PORCINE) Infusion 12 Units/kg/hr (01/25/22 1537)    dextrose      norepinephrine (LEVOPHED) infusion 30 mcg/min (01/25/22 6858)    vasopressin (Septic Shock) infusion 0.04 Units/min (01/25/22 8811)    propofol 50 mcg/kg/min (01/25/22 0823)    sodium chloride       PRN Meds:.heparin (porcine), heparin (porcine), LORazepam, glucose, dextrose, glucagon (rDNA), dextrose, sodium chloride flush, sodium chloride, ondansetron **OR** ondansetron, polyethylene glycol, acetaminophen **OR** acetaminophen  .    fentaNYL (SUBLIMAZE) infusion 75 mcg/hr (01/25/22 0633)    heparin (PORCINE) Infusion 12 Units/kg/hr (01/25/22 5389)    dextrose      norepinephrine (LEVOPHED) infusion 30 mcg/min (01/25/22 3956)    vasopressin (Septic Shock) infusion 0.04 Units/min (01/25/22 0477)    propofol 50 mcg/kg/min (01/25/22 0823)    sodium chloride          Allergies:     No Known Allergies     Review of Systems:       Review of Systems  vented    Objective Findings: Vitals:BP (!) 145/63   Pulse 66   Temp 96.6 °F (35.9 °C) (Bladder)   Resp 20   Ht 5' 7\" (1.702 m)   Wt 160 lb (72.6 kg)   SpO2 100%   BMI 25.06 kg/m²      Physical Examination:    Physical Exam   Constitutional: No distress. He appears acutely ill. HENT:   Normal cephalic and Atraumatic   Eyes: Pupils are equal, round, and reactive to light. Neck: Thyroid normal. No JVD present. No neck adenopathy. No thyromegaly present. Cardiovascular: Normal rate, intact distal pulses and normal pulses. An irregularly irregular rhythm present. Murmur heard. Pulmonary/Chest: Effort normal and breath sounds normal. He has no wheezes. He has no rales. He exhibits no tenderness. Abdominal: Soft. Bowel sounds are normal. There is no abdominal tenderness. Musculoskeletal:         General: Edema present. No tenderness. Normal range of motion. Cervical back: Normal range of motion and neck supple. Neurological: He exhibits altered mental status and a cognitive deficit. Skin: Skin is warm. No cyanosis. Nails show no clubbing.          Results/ Medications reviewed 1/25/2022, 11:48 AM     Laboratory, Microbiology, Pathology, Radiology, Cardiology, Medications and Transcriptions reviewed  Scheduled Meds:   vancomycin (VANCOCIN) intermittent dosing (placeholder)   Other RX Placeholder    norepinephrine        levetiracetam  500 mg IntraVENous Daily    pantoprazole  40 mg IntraVENous Daily    And    sodium chloride (PF)  10 mL IntraVENous Daily    insulin lispro  0-12 Units SubCUTAneous Q4H    sennosides-docusate sodium  2 tablet Oral BID    sodium chloride flush  5-40 mL IntraVENous 2 times per day    piperacillin-tazobactam  2,250 mg IntraVENous Q8H    dexamethasone  6 mg IntraVENous Q12H    chlorhexidine  15 mL Mouth/Throat BID     Continuous Infusions:   fentaNYL (SUBLIMAZE) infusion 75 mcg/hr (01/25/22 8903)    heparin (PORCINE) Infusion 12 Units/kg/hr (01/25/22 1315)    dextrose      norepinephrine (LEVOPHED) infusion 30 mcg/min (01/25/22 4640)    vasopressin (Septic Shock) infusion 0.04 Units/min (01/25/22 2983)    propofol 50 mcg/kg/min (01/25/22 0823)    sodium chloride         Recent Labs     01/24/22  1800 01/24/22  1913 01/25/22  0109 01/25/22  0648 01/25/22  0820   WBC 13.9*  --   --  23.7*  --    HGB 9.4*   < > 10.1* 9.3* 9.7*   HCT 30.7*  --   --  29.4*  --    .8*  --   --  98.7  --      --   --  173  --     < > = values in this interval not displayed. Recent Labs     01/24/22  1800 01/24/22  1913 01/25/22  0116 01/25/22  0734 01/25/22  0820   *  --  135 131*  --    K 6.6*  --  4.5 5.6*  --    CL 94*  --  93* 90*  --    CO2 22  --  28 24  --    PHOS  --   --  4.5 4.9*  --    BUN 55*  --  32* 39*  --    CREATININE 8.62*   < > 5.01* 6.04* 6.3*    < > = values in this interval not displayed. Recent Labs     01/24/22  1800   AST 21   ALT 18   BILITOT 0.4   ALKPHOS 107*     No results for input(s): LIPASE, AMYLASE in the last 72 hours. Recent Labs     01/24/22  1800 01/25/22  0100 01/25/22  0647   PROT 6.3  --   --    INR 1.5 1.4 1.9     CT Head WO Contrast    Result Date: 1/25/2022  EXAMINATION: CT of the brain without contrast HISTORY: Cardiac arrest. Fall. COMPARISON: None available TECHNIQUE: Multiple contiguous axial images were obtained of the brain from the skull base through the vertex. Multiplanar reformats were obtained. FINDINGS: Prominence of the sulci and ventricles compatible with mild generalized parenchymal volume loss. Areas of bilateral supratentorial white matter hypoattenuation are nonspecific but most likely related to chronic small vessel ischemic changes in a patient of this age. Gray-white matter differentiation is preserved. No acute hemorrhage or abnormal extra-axial fluid collection. No mass effect or midline shift. The visualized paranasal sinuses and mastoid air cells are clear. Calvarium is intact.  Endotracheal tube and orogastric tube partially visualized. No acute intracranial process. Generalized parenchymal volume loss and nonspecific white matter findings most compatible with chronic small vessel ischemic changes in a patient of this age. All CT scans at this facility use dose modulation, iterative reconstruction, and/or weight based dosing when appropriate to reduce radiation dose to as low as reasonably achievable. CT CERVICAL SPINE WO CONTRAST    Result Date: 1/25/2022  EXAM: CT CERVICAL SPINE WO CONTRAST COMPARISON: None available HISTORY:  Cardiac arrest. Fall. TECHNIQUE: Multiple contiguous axial CT images of the cervical spine were obtained. Multiplanar reformats performed. FINDINGS:  Endotracheal tube is present. A radiopaque foreign body is present along the left lateral aspect of the endotracheal tube at the S2 level. Orogastric tube is present and partially visualized. No acute fracture or malalignment. Atlanto-occipital articulation is maintained. Atlantodental interval is preserved. Cervical spinal vertebral body heights are preserved. Degenerative disc disease at C5-6 and C6-7. Mild multilevel facet arthropathy. Suspect at least mild spinal canal stenosis at C5-6 and C6-7. Varying degrees of osseous neuroforaminal stenosis throughout the cervical spine. There is no prevertebral soft tissue swelling. Areas of septal thickening and small ground glass opacities of both lung apices are nonspecific. Atherosclerotic calcification at the carotid bulbs. No acute fracture or malalignment. 2 cm foreign body along the left aspect of the endotracheal tube at the C2 level. Septal thickening and small groundglass opacities of the lung apices may represent pulmonary edema or may be infectious/inflammatory in etiology. All CT scans at this facility use dose modulation, iterative reconstruction, and/or weight based dosing when appropriate to reduce radiation dose to as low as reasonably achievable.      XR CHEST PORTABLE    Result Date: 1/25/2022  Exam: XR CHEST PORTABLE History:  intubation Technique: AP portable view of the chest obtained. Comparison: none Chest x-ray portable Findings: The patient is intubated  with the tip of the endotracheal tube at the thoracic inlet. There is a  AICD device in place The cardiomediastinal silhouette is within normal limits. There is no pneumothorax. There are mild increased markings throughout both lungs. There is minimal blunting of the left costophrenic recess consistent with a small left pleural effusion. . Bones of the thorax appear intact. There are small left pleural effusion. Status post intubation. There are no consolidations or infiltrates. XR CHEST PORTABLE    Result Date: 1/25/2022  Exam: XR CHEST PORTABLE History:  central line Technique: AP portable view of the chest obtained. Comparison: none Chest x-ray portable Findings: The patient is intubated, there is an NG tube coursing towards the stomach    There is a   central line with the tip projecting in the region of the superior vena cava. There is a  AICD device in place The cardiomediastinal silhouette is within normal limits. There are mild increased markings throughout both lungs. There are no large effusions or consolidations. Bones of the thorax appear intact. There are mild increased pulmonary markings in both lungs which may be bilateral infiltrates, COVID-19 pneumonia. Active Hospital Problems    Diagnosis Date Noted    Cardiac arrest [facility]) [I46.9] 01/24/2022     Priority: Low         Impression/Plan:   1. CPA s/p Defib x3 by squad  2. Will consult EP. It is not clear why the device did not treat Arrhythmia if it was appropriate. Discussed with [employer] rep this am also  3. COVID PNA- with respiratory failure  4. Persistent AF- will need SQ Heparin. 5. MINAL/Hyper K- may need HD  6. CAD- prior CAD. 7. LE Duplex- no DVT. 8. Will need Echo  9.  Pt is critically ill.  cct>30     Thank you for allowing us to participate in the care of this patient. Will continue to follow. Please call if questions or concerns arise.     Electronically signed by Veronica Ma MD on 1/25/2022 at 11:48 AM

## 2022-01-25 NOTE — PROCEDURES
PROCEDURE:   Radial artery line placement. (A-line)  [id number]    INDICATION: Frequent ABGs      CONSENT: The spouse was counseled regarding the procedure, it's indications, risks, potential complications and alternatives and any questions were answered. Consent was obtained. nt. PROCEDURE SUMMARY:   Radial arteries were assessed by palpation and ultrasound localization. Jyl [place] test was done to asses collateral circulation. The patient was prepped and draped in the usual sterile manner using chlorhexidine scrub. 1% lidocaine was used to numb the region. The right  radial artery was palpated and successfully cannulated on the first pass. Pulsatile, arterial blood was visualized and the artery was then threaded using the Seldinger technique and a catheter was then sutured into place. Good wave-form was obtained. The patient tolerated the procedure well without any immediate complications. The area was cleaned and Tegaderm was applied.      ESTIMATED BLOOD LOSS:   Minimal    COMPLICATIONS:  No immediate complication     Liz Cheng, ESTHER - CNP

## 2022-01-25 NOTE — PROGRESS NOTES
Physician Progress Note      PATIENTOlene Challenger  CSN #:                  [id number]  :                       1945  ADMIT DATE:       2022 5:43 PM  [address] [place] DATE:  RESPONDING  PROVIDER #:        Mehul Powell MD          QUERY TEXT:    Pt admitted with COVID pneumonia s/p cardiac arrest. Pt noted to have WBC   13.9/23.7, lactate 4.32/2.62, T prior to placement of arctic sun 93.3/90 . If   possible, please document in the progress notes and discharge summary if you   are evaluating and /or treating any of the following: The medical record reflects the following:  Risk Factors: COVID pneumonia, cardiac arrest, vfib, cardiogenic shock,   hyperkalemia, likely anoxic brain injury, elevated troponin, CHF, acute   respiratory failure  Clinical Indicators: WBC 13.9/23.7, lactate 4.32/2.62, T prior to placement of   arctic sun 93.3, /131, K 6.6/5.6, Covid positive,   7.152/66/413/23/100% on 90%, 7.336/51/77/27.3/94 on 60%, 7.350/44/90/24.0/97%   on 60%. cxray pending  Treatment: ACLS, intubation, ICU admission, Decadron, epinephrine, levophed,   vancomycin, vasopressin    Ryan MEZAN, RN, [facility]  [phone]  Options provided:  -- Sepsis, present on admission  -- COVID pneumonia without Sepsis  -- Other - I will add my own diagnosis  -- Disagree - Not applicable / Not valid  -- Disagree - Clinically unable to determine / Unknown  -- Refer to Clinical Documentation Reviewer    PROVIDER RESPONSE TEXT:    This patient has COVID pneumonia without Sepsis.     Query created by: Ed Jennifer on 2022 10:08 AM      Electronically signed by:  Mehul Powell MD 2022 3:48 PM

## 2022-01-25 NOTE — PROGRESS NOTES
Spiritual Care Services     Summary of Visit:   visited patient and family in ICU. Family were sitting outside the room since the patient is in Covid isolation. Family talked about patient's cardiac arrest yesterday and the patient's history of illnesses. Family is dealing with multiple recent losses including the patient's son and grandson within the last three years. Patient also draws support from a family friend who is a hospice chaplain in [place].  provided spiritual support, active listening, and prayer. Spiritual Assessment/Intervention/Outcomes:    Encounter Summary  Services provided to[de-identified] Patient and family together  Referral/Consult From[de-identified] Rounding  Support System: Spouse,Children,Family members  Continue Visiting: Yes  Complexity of Encounter: Moderate  Length of Encounter: 30 minutes  Spiritual Assessment Completed: Yes  Routine  Type: Follow up  Crisis  Type: ED Alert (Full Arrest)  Assessment: Approachable,Tearful,Anxious  Intervention: Active listening,Explored feelings, thoughts, concerns,Prayer,Sustaining presence/ Ministry of presence,Discussed illness/injury and it's impact,Discussed meaning/purpose  Outcome: Comfort,Engaged in conversation,Expressed feelings/needs/concerns,Shared life review,Shared reminiscences,Receptive,Less anxious, less agitated  Spiritual/Baptism  Type: Spiritual support  Assessment: Calm,Approachable,Anxious,Anticipatory grief,Unresolved grief  Intervention: Active listening,Explored feelings, thoughts, concerns,Explored coping resources,Prayer,Sustaining presence/ Ministry of presence  Outcome: Connection/belonging,Expressed gratitude,Engaged in conversation,Hopeful                    Values / Beliefs  Do you have any ethnic, cultural, sacramental, or spiritual Baptism needs you would like us to be aware of while you are in the hospital?: No (frida)    Care Plan:     will continue to visit as needed or requested.     Spiritual Care Services Electronically signed by Licha Gonzales on 1/25/22 at 1:13 PM EST     To reach a  for emotional and spiritual support, place an [facility] consult request.   If a  is needed immediately, dial 0 and ask to page the on-call .

## 2022-01-25 NOTE — PROGRESS NOTES
PHARMACY NOTE:   ICU Rounds Attended (10-15 minutes in patient room):    Pt diagnosis: COVID/cardiac arrest    IV Fluids: none   Renal:   Recent Labs     01/25/22  0116 01/25/22  0734 01/25/22  0820   CREATININE 5.01* 6.04* 6.3*    Estimated Creatinine Clearance: 9 mL/min (A) (based on SCr of 6.3 mg/dL [facility] Antimicrobial Therapy:   Day 2   Antimicrobial agents: vanco/zosyn  Cultures blood and respiratory cultures pending   ID on consult: no    Recent Labs     01/24/22  1800 01/25/22  0648   WBC 13.9* 23.7*        Pressors:   norepinephrine   vasopressin     Sedation:   Fentanyl   Propofol      Drips: keppra, vecuronium    Insulin Therapy (goal: 140-180): none.  Added on rounds    Recent Labs     01/24/22  1800 01/25/22  0116 01/25/22  0734   GLUCOSE 151* 139* 188*       Steroid Therapy: decadron 6mg x 2 days  Stress Ulcer Prophylaxis:   Pantoprazole added per verbal      DVT Prophylaxis/Anticoagulant Therapy: heparin drip   Recent Labs     01/24/22  1800 01/25/22  0648    173     Recent Labs     01/24/22  1800 01/25/22  0100 01/25/22  0647   INR 1.5 1.4 1.9       Bowel Regimen:   Miralax PRN      Follow up/Changes:   -Heparin SC TID    -Senna S BID added per verbal  -Protonix added on rounds  -Vecuronium d/c'd per verbal  -Medium SSI added per verbal    Brody Young, [address] [place] [address], PharmD   1/25/2022 11:00 AM

## 2022-01-25 NOTE — CARE COORDINATION
[facility] Case Management Initial Discharge Assessment    Met with Family to discuss discharge plan. PCP: Gilford Abts, MD                                Date of Last Visit: Current    If no PCP, list provided? N/A    Discharge Planning    Living Arrangements: independently at home    Who do you live with? Lives with wife and has daughter who also helps    Who helps you with your care:  self or spouse    If lives at home:     Do you have any barriers navigating in your home? no    Patient can perform ADL? Yes    Current Services (outpatient and in home) :  None    Dialysis: Yes, Location [facility], [address]    Is transportation available to get to your appointments? Yes  - Pt drives self to dialysis    DME Equipment:  No  - Per dtr he is stubborn and will not use equip and he does not have any of his own. Respiratory equipment: Continuous Oxygen  2 Liters  and Nebulizer    Respiratory provider:  yes - wife and dtr do not know     Pharmacy:  yes - [facility] [address] with Medication Assistance Program?  No      Patient agreeable to [facility]? N/A    Patient agreeable to SNF/Rehab? N/A    Other discharge needs identified? N/A    Does Patient Have a High-Risk for Readmission Diagnosis (CHF, PN, MI, COPD)? No    If Yes,       Initial Discharge Plan? (Note: please see concurrent daily documentation for any updates after initial note). Met with wife and dtr outside of patients room to get baseline information. Dr. Lisa Gutiérrez spoke to them re: pt status and he is critical at present and will be seen by multiple specialists including neurology to help determine prognosis. Pt on vent and sedation/pressors at present. He is on [place] sun and he is dialysis patient.      Readmission Risk              Risk of Unplanned Readmission:  27         Electronically signed by Laron Joya on 1/25/2022 at 11:15 AM

## 2022-01-25 NOTE — FLOWSHEET NOTE
Shift summary    [id number] pt to ICU from ER via cot. On epi, levo, amio, and precedex. When pt slid from cot to bed, CVC line in R neck came out, sutures in place to neck however not to line itself. Pressors placed to IO in L leg. BP dropped to 68K systolic, rapid response called, precedex and amio stopped at this time d/t BP. Pt placed in trendelenberg for BP. sats in 80s so FIO2 increased to 100% per RT. Pressure held to R neck and drsg applied. MP V-paced with frequent PVCs. Pt T 32.2. potts in place clear yellow drainage. LS dim with rhonchi. OG to LIWS. Pupils equal, non-reactive, no corneal noted, no gag. Pt showing decorticate posturing at times. Restraints applied to prevent line and tube removal. LFA fistula + bruit and + thrill. Dr. Javid Millan and Meryle Seat here, R femoral CVC line placed emergently, epi gtt stopped, vaso gtt added. Placed on arctic sun @ 2130 with target temp 34C per order. Skin appears intact. HD RN here. Consent obtained for HD. Pt daughter Corrinne Redbird here with friend who is , updated. Phone number and privacy code given. Abi in to see pt. Pt tolerating HD, did have slight dip in BP after about 30 minutes, pressors increased. Will initiate Q6 hour labs after HD treatment is complete. 0000 spoke with Dr. Makayla Lacey, updated. Target temp changed to 36C. Electronically signed by Magalie Ponce RN on 1/25/2022 at 12:22 AM    0045 spoke with daughter Corrinne Redbird, updated per request.     2646-5313 HD tx done. Coming down on levophed as  systolic post treatment. Noted increased amount of myoclonic jerks, no other change to neuro assessment. Propofol increased per MAR. Heel mepliex placed, labs drawn and sent. Tolerating arctic sun.     8470 maxed out on propofol, continuing to display myoclonic jerks. Message to hospitalist. Electronically signed by Magalie Ponce RN on 1/25/2022 at 3:02 AM    0400 continues to have myoclonic jerks, awaiting fentanyl from pharmacy. Messaged hospitalist re: neuro consult/possible keppra, no orders received. 0530 continues to have myoclonic jerks/decorticate positioning although less intense compared to earlier. Pupils now 3 and reactive b/l. Restraints dc'd at this time. 0630 I/o's done. Labs drawn and sent. Fentanyl gtt increased as pt continues to have myoclonic jerking movements, however less compared to before.  Electronically signed by Jone Montano RN on 1/25/2022 at 6:45 AM

## 2022-01-25 NOTE — ED PROVIDER NOTES
[address] [facility] ED  EMERGENCY DEPARTMENT ENCOUNTER      Pt Name: Daxa Claudio  MRN: [id number]  [place] 1945  Date of evaluation: 1/24/2022  Provider: Tiffany Mast DO    CHIEF COMPLAINT       Chief Complaint   Patient presents with    Cardiac Arrest         HISTORY OF PRESENT ILLNESS   (Location/Symptom, Timing/Onset, Context/Setting, Quality, Duration, Modifying Factors, Severity)  Note limiting factors. Daxa Claudio is a 68 y.o. male who presents to the emergency department . Patient brought in after cardiac arrest at home. Patient collapsed falling forward hitting his face and breaking his teeth. He was unresponsive for approximately 7 minutes. Wife called 911 immediately and threw oxygen on him but did not start CPR. EMS found him to be pulseless and in V. fib. They did shocked him once he had two amps of epinephrine and they recovered a pulse. They put him on a epi drip. They did use a laryngeal airway as he had difficulty with intubation. Patient had a left lower extremity IO in 1 peripheral line. On arrival patient unresponsive. [facility]    Nursing Notes were reviewed. REVIEW OF SYSTEMS    (2-9 systems for level 4, 10 or more for level 5)     Review of Systems   Unable to perform ROS: Patient unresponsive       Except as noted above the remainder of the review of systems was reviewed and negative.        PAST MEDICAL HISTORY     Past Medical History:   Diagnosis Date    BPH (benign prostatic hypertrophy)     CAD (coronary artery disease)     se[facility] Dr. Luly Wray [facility]    Cardiomyopathy [facility])     CHF (congestive heart failure) ([address]     LVEF 40% on echo 6/2014    COPD (chronic obstructive pulmonary disease) ([address]     has O2 rarely uses, has nebulizer    Hypertension     On home oxygen therapy     Type II or unspecified type diabetes mellitus without mention of complication, not stated as uncontrolled          SURGICAL HISTORY     No past surgical history on file. CURRENT MEDICATIONS       Previous Medications    ALISKIREN (TEKTURNA) 300 MG TABLET    Take 300 mg by mouth daily. ASPIRIN 325 MG TABLET    Take 325 mg by mouth daily. ATORVASTATIN (LIPITOR) 20 MG TABLET    Take 20 mg by mouth daily. CARVEDILOL (COREG) 25 MG TABLET    Take 25 mg by mouth 2 times daily (with meals). CLOTRIMAZOLE-BETAMETHASONE (LOTRISONE) CREAM    Apply topically 2 times daily to feet including in between toes for 2 weeks    ENALAPRIL (VASOTEC) 20 MG TABLET    Take 20 mg by mouth daily. FLUTICASONE-SALMETEROL (ADVAIR DISKUS) 250-50 MCG/DOSE AEPB    Inhale 1 puff into the lungs every 12 hours. FUROSEMIDE (LASIX) 40 MG TABLET        METFORMIN (GLUCOPHAGE) 1000 MG TABLET    TAKE 1 TABLET TWICE A DAY WITH MEALS    NITROSTAT 0.4 MG SL TABLET        SILDENAFIL (VIAGRA) 100 MG TABLET    take 1 tablet by mouth as needed for erectile dysfunction    SIMVASTATIN (ZOCOR) 40 MG TABLET    Take 1 tablet by mouth every evening    SPIRIVA HANDIHALER 18 MCG INHALATION CAPSULE        TAMSULOSIN (FLOMAX) 0.4 MG CAPSULE    Take 0.4 mg by mouth daily. TAMSULOSIN (FLOMAX) 0.4 MG CAPSULE    Take 1 capsule by mouth daily    TICAGRELOR (BRILINTA) 90 MG TABS TABLET    Take 1 tablet by mouth 2 times daily    TIOTROPIUM BROMIDE MONOHYDRATE (SPIRIVA HANDIHALER IN)    Inhale  into the lungs. VENTOLIN  (90 BASE) MCG/ACT INHALER           ALLERGIES     Patient has no known allergies.     FAMILY HISTORY       Family History   Problem Relation Age of Onset    Lung Cancer Mother     Seizures Other           SOCIAL HISTORY       Social History     Socioeconomic History    Marital status:      Spouse name: Not on file    Number of children: Not on file    Years of education: Not on file    Highest education level: Not on file   Occupational History    Not on file   Tobacco Use    Smoking status: Current Every Day Smoker     Packs/day: 1.00     Years: 45.00 Pack years: 45.00    Smokeless tobacco: Never Used   Substance and Sexual Activity    Alcohol use: Yes     Comment: OCCASIONAL    Drug use: No    Sexual activity: Not on file   Other Topics Concern    Not on file   Social History Narrative    Not on file     Social Determinants of Health     Financial Resource Strain:     Difficulty of Paying Living Expenses: Not on file   Food Insecurity:     Worried About Running Out of Food in the Last Year: Not on file    Nayan of Food in the Last Year: Not on file   Transportation Needs:     Lack of Transportation (Medical): Not on file    Lack of Transportation (Non-Medical): Not on file   Physical Activity:     Days of Exercise per Week: Not on file    Minutes of Exercise per Session: Not on file   Stress:     Feeling of Stress : Not on file   Social Connections:     Frequency of Communication with Friends and Family: Not on file    Frequency of Social Gatherings with Friends and Family: Not on file    Attends [employer] Services: Not on file    Active Member of [address] [place] [address] or Organizations: Not on file    Attends Club or Organization Meetings: Not on file    Marital Status: Not on file   Intimate Partner Violence:     Fear of Current or Ex-Partner: Not on file    Emotionally Abused: Not on file    Physically Abused: Not on file    Sexually Abused: Not on file   Housing Stability:     Unable to Pay for Housing in the Last Year: Not on file    Number of [place] in the Last Year: Not on file    Unstable Housing in the Last Year: Not on file       SCREENINGS                        PHYSICAL EXAM    (up to 7 for level 4, 8 or more for level 5)     ED Triage Vitals   BP Temp Temp src Pulse Resp SpO2 Height Weight   01/24/22 1747 01/24/22 1807 -- 01/24/22 1747 01/24/22 1747 01/24/22 1755 01/24/22 1747 01/24/22 1747   118/69 93.3 °F (34.1 °C)  65 14 97 % 5' 7\" (1.702 m) 160 lb (72.6 kg)       Physical Exam  Vitals and nursing note reviewed.    HENT: Head: Normocephalic. Right Ear: Tympanic membrane normal.      Left Ear: Tympanic membrane normal.      Nose: Nose normal.      Mouth/Throat:      Comments: Front teeth broken  Eyes:      Comments: Pupils 2 mm bilaterally minimally reactive   Cardiovascular:      Rate and Rhythm: Normal rate and regular rhythm. Pulmonary:      Comments: Equal breath sounds with bagging  Abdominal:      Palpations: Abdomen is soft. Musculoskeletal:      Right lower leg: Edema present. Left lower leg: Edema present. Skin:     General: Skin is warm. Neurological:      Comments: Unresponsive. Seizure type activity with eyes twitching bilaterally. DIAGNOSTIC RESULTS     EKG: All EKG's are interpreted by the Emergency Department Physician who either signs or Co-signs this chart in the absence of a cardiologist.    Sickle paced rhythm 64 bpm biventricular pacemaker detected    RADIOLOGY:   Non-plain film images such as CT, Ultrasound and MRI are read by the radiologist. Plain radiographic images are visualized and preliminarily interpreted by the emergency physician with the below findings:    First chest x-ray shows ET tube in position orogastric tube in position  Second chest x-ray shows central line in place with no evidence of pneumothorax.   Pulmonary edema developing    Interpretation per the Radiologist below, if available at the time of this note:    XR CHEST PORTABLE    (Results Pending)   CT Head WO Contrast    (Results Pending)   CT CERVICAL SPINE WO CONTRAST    (Results Pending)   XR CHEST PORTABLE    (Results Pending)         ED BEDSIDE ULTRASOUND:   Performed by ED Physician - none    LABS:  Labs Reviewed   TROPONIN - Abnormal; Notable for the following components:       Result Value    Troponin 0.130 (*)     All other components within normal limits    Narrative:     Cheryle [place] tel. [phone],  TROP results called to and read back by Bernardino Calixto RN, 01/24/2022  18:37, by Ashley Hernandez MAGNESIUM - Abnormal; Notable for the following components:    Magnesium 3.4 (*)     All other components within normal limits    Narrative:     Collins Randolph  [facility] tel. [phone],  CREA AND POTASSIUM results called to and read back by [address],  01/24/2022 18:37, by Heidi Tierney results called to and read back by Trey Cruz RN, 01/24/2022  18:37, by Ivette Chatterjee   CBC WITH AUTO DIFFERENTIAL - Abnormal; Notable for the following components:    WBC 13.9 (*)     RBC 2.98 (*)     Hemoglobin 9.4 (*)     Hematocrit 30.7 (*)     .8 (*)     MCH 31.6 (*)     MCHC 30.8 (*)     RDW 17.5 (*)     All other components within normal limits   COMPREHENSIVE METABOLIC PANEL - Abnormal; Notable for the following components:    Sodium 134 (*)     Potassium 6.6 (*)     Chloride 94 (*)     Anion Gap 18 (*)     Glucose 151 (*)     BUN 55 (*)     CREATININE 8.62 (*)     GFR Non- 6.0 (*)     GFR  7.3 (*)     Alkaline Phosphatase 107 (*)     All other components within normal limits    Narrative:     MARTINEZ Newsome  [facility] tel. [phone],  CREA AND POTASSIUM results called to and read back by [address],  01/24/2022 18:37, by Heidi Tierney results called to and read back by Trey Cruz RN, 01/24/2022  18:37, by Ivette Chatterjee   PROTIME-INR - Abnormal; Notable for the following components:    Protime 17.9 (*)     All other components within normal limits   POCT GLUCOSE - Abnormal; Notable for the following components:    POC Glucose 140 (*)     All other components within normal limits   POCT ARTERIAL - Abnormal; Notable for the following components:    POC Sodium 132 (*)     POC Potassium 7.1 (*)     POC Glucose 172 (*)     POC Creatinine 8.5 (*)     GFR Non- 6 (*)     GFR  7 (*)     Calcium, Ion 1.09 (*)     pH, Arterial 7.152 (*)     pCO2, Arterial 66 (*)     pO2, Arterial 413 (*)     Base Excess, Arterial -6 (*)     O2 Sat, Arterial 100 (*)     Lactate 4.32 (*) POC Hematocrit 27 (*)     Hemoglobin 9.2 (*)     All other components within normal limits   COVID-19, RAPID       All other labs were within normal range or not returned as of this dictation. EMERGENCY DEPARTMENT COURSE and DIFFERENTIAL DIAGNOSIS/MDM:   Vitals:    Vitals:    01/24/22 1900 01/24/22 1916 01/24/22 1921 01/24/22 1922   BP: (!) 92/55      Pulse: 75 60 62 61   Resp: 12 12 17 12   Temp:       SpO2:       Weight:       Height:         Patient came in after cardiac arrest.  Potassium high as he missed dialysis today. V. fib arrest with no recurrence of V. fib after the shock. Patient put on amnio drip Precedex drip Levophed drip. I did discuss this case with the hospitalist Dr. Linda Merritt. Dr Hermann Schwartz to take care of dialysis. Pt received hyperkalemia cocktail. Family states full code. Never discussed what patient would want in the event of an arrest.      MDM      REASSESSMENT          CRITICAL CARE TIME   Total Critical Care time was 60 minutes, excluding separately reportable procedures. There was a high probability of clinically significant/life threatening deterioration in the patient's condition which required my urgent intervention. CONSULTS:  None    PROCEDURES:  Unless otherwise noted below, none     Intubation    Date/Time: 1/24/2022 7:37 PM  Performed by: Tiffany Mast DO  Authorized by: Tiffany Mast DO     Consent:     Consent obtained:  Emergent situation  Pre-procedure details:     Patient status:  Unresponsive    Mallampati score: I    Pretreatment medications:  Midazolam    Paralytics:  Succinylcholine  Procedure details:     Preoxygenation:  Bag valve mask    CPR in progress: no      Intubation method:  Oral    Oral intubation technique:  Video-assisted    Laryngoscope blade:   Mac 3    Tube size (mm):  7.0    Tube type:  Cuffed    Number of attempts:  2    Ventilation between attempts: no      Cricoid pressure: no      Tube visualized through cords: yes    Placement assessment:     ETT to lip:  24    Tube secured with:  ETT castano    Breath sounds:  Equal    Placement verification: CXR verification and ETCO2 detector      CXR findings:  ETT in proper place  Post-procedure details:     Patient tolerance of procedure: Tolerated well, no immediate complications  Central Line    Date/Time: 1/24/2022 7:42 PM  Performed by: Cassie Riedel, DO  Authorized by: Cassie Riedel, DO     Consent:     Consent obtained:  Emergent situation  Pre-procedure details:     Hand hygiene: Hand hygiene performed prior to insertion      Sterile barrier technique: All elements of maximal sterile technique followed      Skin preparation:  2% chlorhexidine    Skin preparation agent: Skin preparation agent completely dried prior to procedure    Anesthesia (see MAR for exact dosages): Anesthesia method:  None  Procedure details:     Location:  R internal jugular    Patient position:  Flat    Catheter size:  7.5 Fr    Landmarks identified: yes      Ultrasound guidance: yes      Sterile ultrasound techniques: Sterile gel and sterile probe covers were used      Number of attempts:  2    Successful placement: yes    Post-procedure details:     Post-procedure:  Dressing applied and line sutured    Assessment:  Blood return through all ports and no pneumothorax on x-ray    Patient tolerance of procedure: Tolerated well, no immediate complications             FINAL IMPRESSION      1. Cardiac arrest ([address]    2. Hyperkalemia    3. Chronic renal failure syndrome, stage 5 (HCC)    4. Injury of head, initial encounter    5. Fracture of tooth (traumatic), initial encounter for closed fracture    6. Seizure [facility])          DISPOSITION/PLAN   DISPOSITION Admitted 01/24/2022 07:13:22 PM      PATIENT REFERRED TO:  No follow-up provider specified. DISCHARGE MEDICATIONS:  New Prescriptions    No medications on file     Controlled Substances Monitoring:     No flowsheet data found.     (Please note that portions of this note were completed with a voice recognition program.  Efforts were made to edit the dictations but occasionally words are mis-transcribed.)    Kunal Nicole DO (electronically signed)  Attending Emergency Physician           Kunal Nicole DO  01/24/22 1948

## 2022-01-25 NOTE — CONSULTS
[address]                      [address], [address] [facility]                                  CONSULTATION    PATIENT NAME: Dixon Sicard                     :        1945  MED REC NO:   [id number]                            ROOM:       16  ACCOUNT NO:   [de-identified]                           ADMIT DATE: 2022  PROVIDER:     Carlos Amaral DO    CONSULT DATE:  2022    RENAL CONSULTATION    HISTORY OF PRESENT ILLNESS:  A 68year-old admitted to the hospital  after having a cardiac arrest at home. The patient does have a history  of end-stage renal disease and is on hemodialysis support three times a  week. He missed his last treatment. The patient has known chronic  systolic heart failure and COPD. On admission to the hospital, the  patient was intubated and hypotensive/shock, was started on pressors and  amiodarone drip due to atrial fibrillation. The patient is seen by Dr. Merlin Merritt; however, this nephrologist does not come to the emergency  room. Past Friday, the patient's potassium was 6.6/7.7. The patient is  intubated, sedated, and on pressor agents. Unable to get any history at  this time. He did have hemodialysis support last night. PAST MEDICAL HISTORY:  Organic heart disease, diabetes mellitus type 2,  COPD, BPH, _____, and neuropathy. PAST SURGICAL HISTORY:  None listed. HABITS:  45 pack-year smoking history. He does use alcohol on a daily  basis. MEDICATIONS:  At the time of his admission to the hospital I doubt  Glucophage but it is listed, Lasix, Flomax, Coreg, Entresto, Proscar,  Lanoxin, Colcrys, Eliquis, and Zyloprim. ALLERGIES TO MEDICATIONS:  None. PHYSICAL EXAMINATION:  VITAL SIGNS:  5 feet 7 inches, 160 pounds. Blood pressure at this time  is 145/60, heart rate 70, respirations 20, afebrile. HEENT:  Normocephalic. Intubated. HEART:  Regular with 1/6 systolic murmur. Abdomen:  Soft.   No guarding or rigidity. Bowel sounds are present, but  decreased. EXTREMITIES:  Show the patient to have cool limbs. The patient is  sedated, has no movement even to painful stimuli. No edema is present. IMPRESSION:  1. End-stage renal disease, on hemodialysis support. 2.  Hyperkalemia on admission, improved since dialysis. 3.  Organic heart disease with cardiomyopathy, ejection fraction  uncertain. 4.  History of AICD placement. 5.  Cardiogenic shock. 6.  Status post cardiopulmonary arrest.    PLAN:  Telemetry at this time. Amiodarone and pressor agents as needed. Dialysis scheduled again for tomorrow. The patient does have some  posturing of his upper limbs at this time. Neurology to follow along. Keppra was given due to what appeared to be jerking sensations by the  patient and possible seizure activity.         Enrique Hughes DO    D: 01/25/2022 9:46:52       T: 01/25/2022 9:50:47     [email or web address]  Job#: [id number]     Doc#: [id number]    CC:

## 2022-01-25 NOTE — H&P
Department of Internal Medicine - Staff Internal Medicine Service          ADMISSION NOTE/HISTORY AND PHYSICAL EXAMINATION   ______________________________________________________________________    HISTORY OBTAIN FROM:   electronic medical record and daughter     CHIEF COMPLAINT: Cardiac arrest      HISTORY OF PRESENT ILLNESS:    Patient is a 59-year-old male with multiple comorbidities including history of hypertension, COPD, history of tobacco abuse history of end-stage renal disease on daily hemodialysis, patient continues to smoke, history of chronic systolic heart failure status post AICD brought to the ER with cardiac arrest.  Patient currently intubated sedated, history taken from ER physician and daughter who was present in the waiting room.   As per patient's daughter patient has been sick last 2 weeks, family was thinking that patient was having COPD exacerbation, patient is vaccinated for Covid, had booster as well, daughter said that he was supposed to go for dialysis today but he was not feeling well so did not go and schedule for tomorrow, as per patient's daughter he was having hard time breathing and then suddenly passed out, daughter was not sure but thinks that patient was passed out for almost 18 minutes before EMS arrived, o on EMS arrival patient was found to be in V. fib, patient was shocked and received 3 doses of epinephrine as per report, with return of spontaneous circulation, in the ER patient was intubated, central line was placed, patient was found to be in shock, was started on pressors and amiodarone drip, patient was hypothermic on arrival, temperature was 80 Fahrenheit, patient was started on hypothermia protocol,  Labs consistent with severe hyperkalemia with potassium of 6.6,, bicarb 22, glucose 151, troponin 0.130,  I did not see an EKG done in the ER at the time of admission,  Patient also found to have seizure episode in the ER, received a dose of Keppra and Ativan in the ER  Nephrology was called from ER, stat Allises were replaced, patient to be started on hemodialysis. I had a long discussion with patient's daughter in the waiting room, patient daughter told me that patient has never discussed anything about his wishes, patient has 3 children , has been  for almost 48 years, as per patient's daughter patient had a very good life and would want to live for his daughter. Daughter was appropriately tearful, I did explain to her that patient has a risk of coding again and will have another discussion on the CODE STATUS if that happens. PAST MEDICAL HISTORY:        Diagnosis Date    BPH (benign prostatic hypertrophy)     CAD (coronary artery disease)     se[facility]    Cardiomyopathy [facility])     CHF (congestive heart failure) ([facility]     LVEF 40% on echo 6/2014    COPD (chronic obstructive pulmonary disease) ([facility]     has O2 rarely uses, has nebulizer    Hypertension     On home oxygen therapy     Type II or unspecified type diabetes mellitus without mention of complication, not stated as uncontrolled        PAST SURGICAL HISTORY:    No past surgical history on file.     MEDICATION PRIOR TO ADMISSION:  Medications Prior to Admission: sildenafil (VIAGRA) 100 MG tablet, take 1 tablet by mouth as needed for erectile dysfunction  simvastatin (ZOCOR) 40 MG tablet, Take 1 tablet by mouth every evening  ticagrelor (BRILINTA) 90 MG TABS tablet, Take 1 tablet by mouth 2 times daily  tamsulosin (FLOMAX) 0.4 MG capsule, Take 1 capsule by mouth daily  metFORMIN (GLUCOPHAGE) 1000 MG tablet, TAKE 1 TABLET TWICE A DAY WITH MEALS  furosemide (LASIX) 40 MG tablet,   NITROSTAT 0.4 MG SL tablet,   SPIRIVA HANDIHALER 18 MCG inhalation capsule,   clotrimazole-betamethasone (LOTRISONE) cream, Apply topically 2 times daily to feet including in between toes for 2 weeks  VENTOLIN  (90 BASE) MCG/ACT inhaler,   Tiotropium Bromide Monohydrate ([facility] [place]), Inhale  into the lungs. fluticasone-salmeterol (ADVAIR DISKUS) 250-50 MCG/DOSE AEPB, Inhale 1 puff into the lungs every 12 hours. enalapril (VASOTEC) 20 MG tablet, Take 20 mg by mouth daily. tamsulosin (FLOMAX) 0.4 MG capsule, Take 0.4 mg by mouth daily. atorvastatin (LIPITOR) 20 MG tablet, Take 20 mg by mouth daily. aspirin 325 MG tablet, Take 325 mg by mouth daily. carvedilol (COREG) 25 MG tablet, Take 25 mg by mouth 2 times daily (with meals). aliskiren (TEKTURNA) 300 MG tablet, Take 300 mg by mouth daily. Allergies:  Patient has no known allergies. SOCIAL HISTORY:   Continues to smoke as per daughter    FAMILY HISTORY:       Problem Relation Age of Onset    Lung Cancer Mother     Seizures Other        REVIEW OF SYSTEMS:  Cannot be obtained     PHYSICAL EXAM:  BP (!) 153/87   Pulse 82   Temp 93.3 °F (34.1 °C)   Resp 22   Ht 5' 7\" (1.702 m)   Wt 160 lb (72.6 kg)   SpO2 100%   BMI 25.06 kg/m²   Physical Exam  Constitutional:       Comments: Intubated sedated, was seen to have twitching of upper extremities   HENT:      Head: Normocephalic. Eyes:      Comments: Pupils sluggish , no corneals observed   Cardiovascular:      Rate and Rhythm: Rhythm irregular. Heart sounds: Normal heart sounds. No murmur heard. Pulmonary:      Comments: Bilateral decreased breath sounds anteriorly  Abdominal:      Palpations: Abdomen is soft. Musculoskeletal:         General: Swelling present. Cervical back: Neck supple. Skin:     Comments: Cold skin          ASSESSMENT/PLAN:  1. Acute cardiopulmonary arrest  2. V. fib   3. Shock likely cardiogenic  4. Hyperkalemia, secondary to missed dialysis  5. COVID-19 pneumonia, patient is vaccinated and has had booster  6. Likely anoxic brain injury  7. History of end-stage renal disease on hemodialysis  8. Leukocytosis  9. Troponin elevation likely secondary to type II myocardial infarction  10. Prolonged QTC  11.  History of chronic systolic heart failure with severely depressed ejection fraction s/p AICD  12.  History of COPD, tobacco abuse    Plan  We will start him on hypothermia protocol  Repeat EKG, EKG showed lead reversal and prolonged QTC, patient does have AICD, on monitor patient was found to have paced rhythm  Leukocytosis could be reactive, continue broad-spectrum antibiotics for that, follow-up with blood cultures  Start on steroids, not a candidate for remdesivir or baricitinib  We will consult pulmonary  Get echocardiogram  Consult cardiology and nephrology  Continue pressors to keep map above 65  Sedation with propofol, monitor triglyceride  Patient currently undergoing emergent dialysis  Check labs as per hypothermia protocol,  Patient had head CT, C-spine CT and chest x-ray in the ER,   Follow-up with the result  Overall prognosis seems poor with multiple comorbidities with COVID-19 and likely anoxic brain injury  Family updated    Apurva Banegas MD

## 2022-01-26 NOTE — PROGRESS NOTES
CMP:    Recent Labs     01/25/22  1345 01/25/22  1617 01/25/22 2000 01/25/22 2000 01/25/22  2201 01/25/22  2300 01/26/22  0157 01/26/22  0520 01/26/22  0600   *  --  129*  --   --   --  128*  --   --    K 5.4*   < > 5.4*   < >  --  5.5* 5.4*  --  5.7*   CL 90*  --  90*  --   --   --  89*  --   --    CO2 22  --  25  --   --   --  23  --   --    BUN 43*  --  46*  --   --   --  49*  --   --    CREATININE 6.26*   < > 6.63*   < > 6.9*  --  6.80* 7.2*  --    GLUCOSE 161*  --  129*  --   --   --  135*  --   --    CALCIUM 8.5  --  8.4*  --   --   --  8.4*  --   --    LABGLOM 8.7*   < > 8.2*   < > 8*  --  7.9* 7*  --     < > = values in this interval not displayed. Troponin:   Recent Labs     01/26/22  0600   TROPONINI 0.315*     BNP: No results for input(s): BNP in the last 72 hours. INR:   Recent Labs     01/25/22  0647   INR 1.9     Lipids: No results for input(s): CHOL, LDLDIRECT, TRIG, HDL, AMYLASE, LIPASE in the last 72 hours. Liver:   Recent Labs     01/24/22  1800   AST 21   ALT 18   ALKPHOS 107*   PROT 6.3   LABALBU 3.7   BILITOT 0.4     Iron:  No results for input(s): IRONS, FERRITIN in the last 72 hours. Invalid input(s): LABIRONS  Urinalysis: No results for input(s): UA in the last 72 hours.     Objective:  Vitals: BP (!) 121/52   Pulse 60   Temp 96.8 °F (36 °C) (Bladder)   Resp 22   Ht 5' 7\" (1.702 m)   Wt 160 lb (72.6 kg)   SpO2 98%   BMI 25.06 kg/m²    Wt Readings from Last 3 Encounters:   01/24/22 160 lb (72.6 kg)   10/27/21 160 lb (72.6 kg)   06/16/16 176 lb (79.8 kg)      24HR INTAKE/OUTPUT:      Intake/Output Summary (Last 24 hours) at 1/26/2022 1839  Last data filed at 1/26/2022 0700  Gross per 24 hour   Intake 2155.19 ml   Output 505 ml   Net 1650.19 ml       General: not alert, in no apparent distress  HEENT: normocephalic, atraumatic, anicteric  Neck: supple, no mass  Lungs: non-labored respirations, clear to auscultation bilaterally  Heart: regular rate and rhythm, no murmurs or rubs  Abdomen: soft, non-tender, non-distended  Ext: no cyanosis, no peripheral edema left arm swollen  Neuro: alert and oriented, no gross abnormalities  Psych: normal mood and affect  Skin: no rash      Electronically signed by Dalila Tripp DO, MD

## 2022-01-26 NOTE — PROGRESS NOTES
Pharmacy Vancomycin Consult     Vancomycin Day: 2  Current Dosing: pre/post HD  Current indication: pneumonia    Temp max:  98.6F    Recent Labs     01/24/22  1800 01/24/22  1913 01/25/22  0648 01/25/22  0734 01/25/22  2201 01/26/22  0157 01/26/22  0520 01/26/22  0800   BUN 55*   < >  --    < >  --  49*  --  52*   CREATININE 8.62*   < >  --    < >   < > 6.80* 7.2* 7.03*   WBC 13.9*  --  23.7*  --   --   --   --   --     < > = values in this interval not displayed. Intake/Output Summary (Last 24 hours) at 1/26/2022 1242  Last data filed at 1/26/2022 1215  Gross per 24 hour   Intake 2655.88 ml   Output 1905 ml   Net 750.88 ml       Ht Readings from Last 1 Encounters:   01/24/22 5' 7\" (1.702 m)        Wt Readings from Last 1 Encounters:   01/26/22 177 lb 11.1 oz (80.6 kg)       Body mass index is 27.83 kg/m². Estimated Creatinine Clearance: 9 mL/min (A) (based on SCr of 7.03 mg/dL [facility])). Random level:  11.3    Assessment/Plan:  Vancomycin 1000mg x1 was given yesterday and hemodialysis completed today. Random level of 11.3 pre-HD is below goal (pre-HD level goal 21-24 ). Will increase to vancomycin 1250mg (15.5mg/kg) x1 today. Will follow daily and assess for need for next dose. No further HD sessions ordered at this time.     Rashaun Crain [facility]  01/26/22  1:00 PM

## 2022-01-26 NOTE — PROGRESS NOTES
Progress Note  Patient: Gael Rodriguez  Unit/Bed: IC16/IC16-01  YOB: 1945  MRN: [id number]  Acct: [de-identified]   Admitting Diagnosis: Cardiac arrest ([facility] [I46.9]  Hyperkalemia [E87.5]  Seizure ([facility] [R56.9]  Injury of head, initial encounter [S09.90XA]  Chronic renal failure syndrome, stage 5 ([facility] [N18.5]  Fracture of tooth (traumatic), initial encounter for closed fracture [S02. 5XXA]  Admit Date:  1/24/2022  Hospital Day: 2    Chief Complaint: CPA    Histories:  Past Medical History:   Diagnosis Date    BPH (benign prostatic hypertrophy)     CAD (coronary artery disease)     se[facility] Dr. Kong [facility] in [facility]    Cardiomyopathy [facility])     CHF (congestive heart failure) ([facility]     LVEF 40% on echo 6/2014    COPD (chronic obstructive pulmonary disease) ([facility]     has O2 rarely uses, has nebulizer    Hypertension     On home oxygen therapy     Type II or unspecified type diabetes mellitus without mention of complication, not stated as uncontrolled      No past surgical history on file.   Family History   Problem Relation Age of Onset    Lung Cancer Mother     Seizures Other      Social History     Socioeconomic History    Marital status:      Spouse name: Not on file    Number of children: Not on file    Years of education: Not on file    Highest education level: Not on file   Occupational History    Not on file   Tobacco Use    Smoking status: Current Every Day Smoker     Packs/day: 1.00     Years: 45.00     Pack years: 45.00    Smokeless tobacco: Never Used   Substance and Sexual Activity    Alcohol use: Yes     Comment: OCCASIONAL    Drug use: No    Sexual activity: Not on file   Other Topics Concern    Not on file   Social History Narrative    Not on file     Social Determinants of Health     Financial Resource Strain:     Difficulty of Paying Living Expenses: Not on file   Food Insecurity:     Worried About [address] in the Last Year: Not on file    Nayan of Food in the Last Year: Not on file   Transportation Needs:     Lack of Transportation (Medical): Not on file    Lack of Transportation (Non-Medical): Not on file   Physical Activity:     Days of Exercise per Week: Not on file    Minutes of Exercise per Session: Not on file   Stress:     Feeling of Stress : Not on file   Social Connections:     Frequency of Communication with Friends and Family: Not on file    Frequency of Social Gatherings with Friends and Family: Not on file    Attends [employer] Services: Not on file    Active Member of [address] [place] [address] [employer] or Organizations: Not on file    Attends Club or Organization Meetings: Not on file    Marital Status: Not on file   Intimate Partner Violence:     Fear of Current or Ex-Partner: Not on file    Emotionally Abused: Not on file    Physically Abused: Not on file    Sexually Abused: Not on file   Housing Stability:     Unable to Pay for Housing in the Last Year: Not on file    Number of [place] in the Last Year: Not on file    Unstable Housing in the Last Year: Not on file       Subjective/HPI pt continues have seizures and sedatives are being titrated. Unresponisve. No gag nor corneal reflex. getting usual HD. Has had few NSVT overnight. Multiple communications with [facility]/ anabela rep and EP service last PM.      The squad manager was contacted. It now is apparent that the squad did not deliver Defib shocks in the field. Unclear why this was reported differently from the ER that he did receive 3 shocks. EKG: NSVT noted    Review of Systems:   Review of Systems  Unable to obtain.      Physical Examination:    BP (!) 121/52   Pulse 64   Temp 98.6 °F (37 °C) (Axillary)   Resp 22   Ht 5' 7\" (1.702 m)   Wt 179 lb 14.3 oz (81.6 kg)   SpO2 100%   BMI 28.18 kg/m²        LABS:  CBC:   Lab Results   Component Value Date    WBC 23.7 01/25/2022    RBC 2.98 01/25/2022    RBC 4.42 05/21/2012    HGB 8.5 01/26/2022    HCT 29.4 01/25/2022    MCV 98.7 01/25/2022    MCH 31.1 01/25/2022    MCHC 31.5 01/25/2022    RDW 17.4 01/25/2022     01/25/2022    MPV 10.4 02/16/2015     CBC with Differential:    Lab Results   Component Value Date    WBC 23.7 01/25/2022    RBC 2.98 01/25/2022    RBC 4.42 05/21/2012    HGB 8.5 01/26/2022    HCT 29.4 01/25/2022     01/25/2022    MCV 98.7 01/25/2022    MCH 31.1 01/25/2022    MCHC 31.5 01/25/2022    RDW 17.4 01/25/2022    LYMPHOPCT 2.5 01/25/2022    MONOPCT 3.1 01/25/2022    BASOPCT 0.3 01/25/2022    MONOSABS 0.7 01/25/2022    LYMPHSABS 0.6 01/25/2022    EOSABS 0.0 01/25/2022    BASOSABS 0.1 01/25/2022     CMP:    Lab Results   Component Value Date     01/26/2022    K 5.6 01/26/2022    CL 89 01/26/2022    CO2 23 01/26/2022    BUN 52 01/26/2022    CREATININE 7.03 01/26/2022    CREATININE 7.2 01/26/2022    GFRAA 9.3 01/26/2022    LABGLOM 7.6 01/26/2022    GLUCOSE 128 01/26/2022    GLUCOSE 76 05/21/2012    PROT 6.3 01/24/2022    LABALBU 3.7 01/24/2022    LABALBU 4.2 05/21/2012    CALCIUM 8.4 01/26/2022    BILITOT 0.4 01/24/2022    ALKPHOS 107 01/24/2022    AST 21 01/24/2022    ALT 18 01/24/2022     BMP:    Lab Results   Component Value Date     01/26/2022    K 5.6 01/26/2022    CL 89 01/26/2022    CO2 23 01/26/2022    BUN 52 01/26/2022    LABALBU 3.7 01/24/2022    LABALBU 4.2 05/21/2012    CREATININE 7.03 01/26/2022    CREATININE 7.2 01/26/2022    CALCIUM 8.4 01/26/2022    GFRAA 9.3 01/26/2022    LABGLOM 7.6 01/26/2022    GLUCOSE 128 01/26/2022    GLUCOSE 76 05/21/2012     Magnesium:    Lab Results   Component Value Date    MG 2.3 01/26/2022     Troponin:    Lab Results   Component Value Date    TROPONINI 0.315 01/26/2022        Active Hospital Problems    Diagnosis Date Noted    Cardiac arrest [facility]) [I46.9] 01/24/2022     Priority: Low        Assessment/Plan:  1. CPA - Metabolic and Respiratory related and not primarily a cardiac foci. 2. EP.  appreciate EP evaluation.  It is apparent the BiV - ICD device functioned normally and did not deliver inappropriate Treatment. 3. COVID PNA- with respiratory failure  4. PAF- will need SQ Heparin. 5. MINAL/Hyper K- Rx during HD  6. CAD- prior CAD. 7. LE Duplex- no DVT. 8. Echo - LVEF 15%  9.  Pt is critically ill.  cct>30       Electronically signed by Pérez Mcknight MD on 1/26/2022 at 9:40 AM

## 2022-01-26 NOTE — PROGRESS NOTES
Progress Note  Date:2022       Room:[place] [address]  Patient Tanya Arango     YOB: 1945     Age:76 y.o. Subjective    Subjective     Review of Systems  ROS: could not be obtained bc pt is intubated  Objective         Vitals Last 24 Hours:  TEMPERATURE:  Temp  Av.4 °F (36.3 °C)  Min: 96.8 °F (36 °C)  Max: 98.6 °F (37 °C)  RESPIRATIONS RANGE: Resp  Av.9  Min: 13  Max: 36  PULSE OXIMETRY RANGE: SpO2  Av.1 %  Min: 92 %  Max: 100 %  PULSE RANGE: Pulse  Av.6  Min: 60  Max: 94  BLOOD PRESSURE RANGE: Systolic (92EJY), IKN:456 , Min:106 , GJJ:799   ; Diastolic (10ZUI), VICK:55, Min:50, Max:88    I/O (24Hr): Intake/Output Summary (Last 24 hours) at 2022 1202  Last data filed at 2022 1007  Gross per 24 hour   Intake 2255.88 ml   Output 505 ml   Net 1750.88 ml     Objective:  General Appearance:  Ill-appearing. Vital signs: (most recent): Blood pressure (!) 121/52, pulse 81, temperature 98.6 °F (37 °C), temperature source Axillary, resp. rate 22, height 5' 7\" (1.702 m), weight 179 lb 7.3 oz (81.4 kg), SpO2 100 %. HEENT: (+ ET tube, neck is supple)    Heart: S1 normal and S2 normal.    Abdomen: Abdomen is soft. Bowel sounds are normal.   There is no epigastric area or suprapubic area tenderness. Pulses: Distal pulses are intact. Neurological: (Sedated). Skin:  Warm and dry. Labs/Imaging/Diagnostics    Labs:  CBC:  Recent Labs     22  1800 22  1913 22  0648 22  0820 22  1617 22  2201 22  0520   WBC 13.9*  --  23.7*  --   --   --   --    RBC 2.98*  --  2.98*  --   --   --   --    HGB 9.4*   < > 9.3*   < > 9.5* 9.4* 8.5*   HCT 30.7*  --  29.4*  --   --   --   --    .8*  --  98.7  --   --   --   --    RDW 17.5*  --  17.4*  --   --   --   --      --  173  --   --   --   --     < > = values in this interval not displayed.      CHEMISTRIES:  Recent Labs     22  2205 01/26/22  0157 01/26/22  0520 01/26/22  0600 01/26/22  0800   *  --  128*  --   --  128*   K 5.4*   < > 5.4*  --  5.7* 5.6*   CL 90*  --  89*  --   --  89*   CO2 25  --  23  --   --  23   BUN 46*  --  49*  --   --  52*   CREATININE 6.63*   < > 6.80* 7.2*  --  7.03*   GLUCOSE 129*  --  135*  --   --  128*   PHOS 4.6  --  5.0*  --   --  5.5*   MG 2.2  --  2.3  --   --  2.3    < > = values in this interval not displayed. PT/INR:  Recent Labs     01/24/22  1800 01/25/22  0100 01/25/22  0647   PROTIME 17.9* 16.9* 21.6*   INR 1.5 1.4 1.9     APTT:  Recent Labs     01/25/22  0100 01/25/22  0647   APTT 45.1* >150.0*     LIVER PROFILE:  Recent Labs     01/24/22  1800   AST 21   ALT 18   BILITOT 0.4   ALKPHOS 107*       Imaging Last 24 Hours:  CT Head WO Contrast    Result Date: 1/25/2022  EXAMINATION: CT of the brain without contrast HISTORY: Cardiac arrest. Fall. COMPARISON: None available TECHNIQUE: Multiple contiguous axial images were obtained of the brain from the skull base through the vertex. Multiplanar reformats were obtained. FINDINGS: Prominence of the sulci and ventricles compatible with mild generalized parenchymal volume loss. Areas of bilateral supratentorial white matter hypoattenuation are nonspecific but most likely related to chronic small vessel ischemic changes in a patient of this age. Gray-white matter differentiation is preserved. No acute hemorrhage or abnormal extra-axial fluid collection. No mass effect or midline shift. The visualized paranasal sinuses and mastoid air cells are clear. Calvarium is intact. Endotracheal tube and orogastric tube partially visualized. No acute intracranial process. Generalized parenchymal volume loss and nonspecific white matter findings most compatible with chronic small vessel ischemic changes in a patient of this age.  All CT scans at this facility use dose modulation, iterative reconstruction, and/or weight based dosing when appropriate to reduce radiation dose to as low as reasonably achievable. CT CERVICAL SPINE WO CONTRAST    Result Date: 1/25/2022  EXAM: CT CERVICAL SPINE WO CONTRAST COMPARISON: None available HISTORY:  Cardiac arrest. Fall. TECHNIQUE: Multiple contiguous axial CT images of the cervical spine were obtained. Multiplanar reformats performed. FINDINGS:  Endotracheal tube is present. A radiopaque foreign body is present along the left lateral aspect of the endotracheal tube at the S2 level. Orogastric tube is present and partially visualized. No acute fracture or malalignment. Atlanto-occipital articulation is maintained. Atlantodental interval is preserved. Cervical spinal vertebral body heights are preserved. Degenerative disc disease at C5-6 and C6-7. Mild multilevel facet arthropathy. Suspect at least mild spinal canal stenosis at C5-6 and C6-7. Varying degrees of osseous neuroforaminal stenosis throughout the cervical spine. There is no prevertebral soft tissue swelling. Areas of septal thickening and small ground glass opacities of both lung apices are nonspecific. Atherosclerotic calcification at the carotid bulbs. No acute fracture or malalignment. 2 cm foreign body along the left aspect of the endotracheal tube at the C2 level. Septal thickening and small groundglass opacities of the lung apices may represent pulmonary edema or may be infectious/inflammatory in etiology. All CT scans at this facility use dose modulation, iterative reconstruction, and/or weight based dosing when appropriate to reduce radiation dose to as low as reasonably achievable.        Past Medical History:   Diagnosis Date    BPH (benign prostatic hypertrophy)     CAD (coronary artery disease)     sees Dr. Lucille Clark in [place] annually    Cardiomyopathy [facility])     CHF (congestive heart failure) ([facility]     LVEF 40% on echo 6/2014    COPD (chronic obstructive pulmonary disease) ([address]     has O2 rarely uses, has nebulizer    Hypertension     On home oxygen therapy     Type II or unspecified type diabetes mellitus without mention of complication, not stated as uncontrolled        Assessment//Plan           Hospital Problems           Last Modified POA    Cardiac arrest ([facility] 1/24/2022 Yes      r/o seizures  Twitching  vfib  ESRD  COPD  ESRD  PAF  CAD    Assessment & Plan    1/25: Continue with hypothermic protocol follow-up cardiology and nephrology evaluation. Follow-up neurology, start [address] for twitching possible seizures, will get EEG done. Ativan as needed for seizures. Monitor labs. Spoke with nursing about the care, prognosis is guarded, palliative care  1/26: Patient getting HD today. Follow-up neurology for repeating CT scan and possible evaluation for anoxic brain injury. EP has seen the patient, echo patient has left ventricular EF of 15%. Prognosis guarded. Follow-up potassium level. Follow-up renal for maintenance dialysis and hyperkalemia.   Electronically signed by Maria L Waggoner MD on 1/25/22 at 9:21 AM EST

## 2022-01-26 NOTE — FLOWSHEET NOTE
01/26/22 0244   Provider Notification   Reason for Communication Critical Value (comment)  (Creat 6.8)   Provider Name Dr. Chantale Cintron   Provider Notification Physician   Method of Communication Secure Message   Response Waiting for response   Notification Time [phone]

## 2022-01-26 NOTE — FLOWSHEET NOTE
01/25/22 2151   Provider Notification   Reason for Communication Critical Value (comment)  (Creat 6.63, Trop 0.314)   Provider Name Dr. Jean Postin   Provider Notification Physician   Method of Communication Secure Message   Response No new orders   Notification Time 2153

## 2022-01-26 NOTE — PROGRESS NOTES
Critical Care Progress Note    2022 3:37 PM    Subjective:   Admit Date: 2022  PCP: Antony Lazo MD    Chief Complaint   Patient presents with    Cardiac Arrest     Interval History: Has been having continuous myoclonus. Currently sedated with fentanyl and propofol. He is currently on 50% FiO2. He continues to be on Levophed. He is currently on IV heparin.        Medications:   Scheduled Meds:   lidocaine  5 mL IntraDERmal Once    sodium chloride flush  5-40 mL IntraVENous 2 times per day    vancomycin  1,250 mg IntraVENous Once    lacosamide (VIMPAT) IVPB  100 mg IntraVENous BID    vancomycin (VANCOCIN) intermittent dosing (placeholder)   Other RX Placeholder    levetiracetam  500 mg IntraVENous Daily    pantoprazole  40 mg IntraVENous Daily    And    sodium chloride (PF)  10 mL IntraVENous Daily    insulin lispro  0-12 Units SubCUTAneous Q4H    sennosides-docusate sodium  2 tablet Oral BID    sodium chloride flush  5-40 mL IntraVENous 2 times per day    piperacillin-tazobactam  2,250 mg IntraVENous Q8H    dexamethasone  6 mg IntraVENous Q12H    chlorhexidine  15 mL Mouth/Throat BID     Continuous Infusions:   sodium chloride      sodium chloride      propofol 50 mcg/kg/min (22 1243)    midazolam 1 mg/hr (22 1415)    fentaNYL (SUBLIMAZE) infusion 200 mcg/hr (22 1200)    heparin (PORCINE) Infusion Stopped (22 1018)    dextrose      norepinephrine (LEVOPHED) infusion 28 mcg/min (22 1025)    vasopressin (Septic Shock) infusion Stopped (22 0957)    sodium chloride           Objective:   Vitals:   Temp (24hrs), Av.7 °F (36.5 °C), Min:96.8 °F (36 °C), Max:98.6 °F (37 °C)    BP (!) 121/52   Pulse 81   Temp 98.6 °F (37 °C)   Resp 22   Ht 5' 7\" (1.702 m)   Wt 177 lb 11.1 oz (80.6 kg)   SpO2 99%   BMI 27.83 kg/m²   I/O:24HR INTAKE/OUTPUT:      Intake/Output Summary (Last 24 hours) at 2022 2027  Last data filed at 2022 1215  Gross per 24 hour   Intake 2465. 11 ml   Output 1905 ml   Net 560.11 ml     01/25 0701 - 01/26 0700  In: 2155.2 [I.V.:1937.1]  Out: 505 [Urine:45]  CVP:             Ventilator Settings:  Vent Mode: AC/VC  Rate Set: 22 bmp   Vt Ordered: 400 mL       PEEP/CPAP: 5   FiO2 : 50 %     Physical Exam:   General appearance -ill-appearing  Mental status-  intubated and sedated  Eyes - pupils equal and reactive, extraocular eye movements intact  Nose - normal and patent  Mouth-ET tube in place. Neck - supple, no significant adenopathy  Chest - clear to auscultationsymmetric air entry  Heart -, regular rhythm, normal rate, S1, S2, no murmurs, rubs, clicks or gallops  Abdomen - soft, nontender, nondistended, no masses or organomegaly  Rectal - deferred, not clinically indicated  Neurological -intubated and sedated. Musculoskeletal - no joint tenderness, deformity or swelling  Extremities - peripheral pulses normal, no pedal edema, no clubbing or cyanosis  Skin - normal coloration and turgor, no rashes                BMP:    Recent Labs     01/25/22 2000 01/25/22 2201 01/26/22  0157 01/26/22  0157 01/26/22  0520 01/26/22  0600 01/26/22  0800   *   < > 128*  --   --   --  128*   K 5.4*   < > 5.4*   < >  --  5.7* 5.6*   CL 90*   < > 89*  --   --   --  89*   CO2 25  --  23  --   --   --  23   BUN 46*   < > 49*  --   --   --  52*   CREATININE 6.63*   < > 6.80*  --  7.2*  --  7.03*   GLUCOSE 129*   < > 135*  --   --   --  128*    < > = values in this interval not displayed. .  MG:3,PHOS:3)@  Ionized Calcium: No results found for: IONCA  CBC:   Recent Labs     01/24/22  1800 01/24/22  1913 01/25/22  0648 01/25/22  0820 01/25/22  2201 01/26/22  0520   WBC 13.9*  --  23.7*  --   --   --    HGB 9.4*   < > 9.3*   < > 9.4* 8.5*     --  173  --   --   --     < > = values in this interval not displayed. ABG: No results for input(s): PH, PCO2, PO2 in the last 72 hours.         Assessment and Plan: Impression:     -CP arrest, ? Etiology, cardiac vs respiratory   -Ventricular tachycardia. Per EMS, had less than 30 seconds. Did not need shock.   -Acute respiratory failure requiring mechanical ventilation. Currently on low vent settings  - persistent seizure activities , myoclonus   -COVID-19 pneumonia.  -Circulatory shock. Currently on  pressors.  -Lactic acidosis due to hypoperfusion. This has resolved. -Hyperkalemia and mild hyponatremia. Both stable.   -Leukocytosis, likely reactive. Consider aspiration pneumonitis.  -Anemia, chronic.     Recommendations:     -Continue current care in the ICU for hemodynamic and airway monitoring.  -Continue ventilator bundle. Is currently on 50% FiO2.  -For COVID-19 pneumonia, continue steroids.    -Continue anticoagulation. He has been on Eliquis before this. Less likely VTE.  -Continue vasopressors. Goal map above 65.  -Continue hypothermia protocol.  -Reasonable to cover for aspiration with antibiotic.  -Dialysis per nephrology.  -Interrogation of his defibrillator.  -Poor prognosis. neurology consulted. Noted plans for EEG. Started rewarming. .            Full Code      Excluding procedures, the total critical care time caring for this patient with life threatening, unstable organ failure, including direct patient contact, review of medical record, management of life support systems, review of data including imaging and labs, discussions with other team members, patient's family and physicians at least 31 minutes so far today.         Electronically signed by Tiffanie Fitzpatrick MD on 1/26/2022 at 3:37 PM

## 2022-01-26 NOTE — FLOWSHEET NOTE
01/25/22 4050   Family/Significant Other Communication   Reason updates   Name Clabe Climes   Relationship Child   Method of Communication Phone

## 2022-01-26 NOTE — PROGRESS NOTES
[id number]: Patient had seizure activity with minimal stimulation. Propofol and Fentanyl drips maintained. PRN ativan given for break through seizures. Family updated. EMS run sheet obtained from [facility] in [place] and placed in chart. Fab from [facility] also called back and confirmed that patient DID NOT receive any shocks. Complete linen changed, oral care given. See flow sheets and eMAR for details.  Electronically signed by Al Ta RN on 1/25/2022 at 7:06 PM

## 2022-01-26 NOTE — CARE COORDINATION
TEAM ICU QUALITY ROUNDS DONE AND NEURO ON CASE. PT ALSO HAS PALL CARE ORDERED. HE REMAINS ON VENT WITH SEDATION AND PER DOCTOR NOTES POOR PROGNOSIS.

## 2022-01-26 NOTE — PROGRESS NOTES
Patient ID:  Melisa Soni  [id number]  68 y.o.  1945    Assumed Care of Patient. Disaster Documentation initiated as per policy / SOP. Report Received from MEREDITH Small. Assessment Complete, please see flow sheets. Labs, orders, plan of care and meds reviewed. 2130 Pt. Remains on [place]. Temp  36.00. Labs sent as ordered. 0000 Rewarming initiated. Tech Soundra Mutters from [employer] used as reference. Labs:   Recent Labs     01/24/22  1800 01/24/22  1913 01/25/22  0648 01/25/22  0648 01/25/22  0820 01/25/22  1617 01/25/22  2201   WBC 13.9*  --  23.7*  --   --   --   --    HGB 9.4*   < > 9.3*   < > 9.7* 9.5* 9.4*   HCT 30.7*  --  29.4*  --   --   --   --      --  173  --   --   --   --     < > = values in this interval not displayed. Recent Labs     01/25/22  0734 01/25/22  0820 01/25/22  1345 01/25/22  1617 01/25/22  2000 01/25/22  2201   *  --  132*  --  129*  --    K 5.6*  --  5.4*  --  5.4*  --    CL 90*  --  90*  --  90*  --    CO2 24  --  22  --  25  --    BUN 39*  --  43*  --  46*  --    CREATININE 6.04*   < > 6.26* 6.2* 6.63* 6.9*   CALCIUM 8.5  --  8.5  --  8.4*  --    PHOS 4.9*  --  4.7  --  4.6  --     < > = values in this interval not displayed.      Recent Labs     01/24/22  1800   AST 21   ALT 18   BILITOT 0.4   ALKPHOS 107*               Electronically signed by Joanne Cockayne, RN

## 2022-01-26 NOTE — CONSULTS
[address]                      [address], [address] [facility]                                  CONSULTATION    PATIENT NAME: Nicole Betancur                     :        1945  MED REC NO:   [id number]                            ROOM:       16  ACCOUNT NO:   [de-identified]                           ADMIT DATE: 2022  PROVIDER:     Mary Cm MD    CONSULT DATE:  2022    HISTORY OF PRESENT ILLNESS:  The patient is a 68-year-old gentleman has  not been doing well for the past few days. There is a report on the  chart that the daughter witnessed him going into cardiac arrest.  Squad  by the time, it arrived, there was apparently no CPR was being done. The reports from the squad is not available yet. We have asked the  squad and the staff here in the hospital to try to retrieve those events  and definitely the rhythm strips. There is reported shocks by the  squad. The patient does have a history of BiV ICD implanted in 2016. There are no reports of device shocking the rhythm. Interrogation of  the device was done earlier today and I have discussed the findings with  the rep, Ms. Carroll Boswell. I asked her to be review the findings with the  technical support of [employer]. They seemed to think that the  device did not document any ventricular fibrillation or ventricular  tachycardia at the times reported. The company insists there is no  event detected and definitely for that reason no events treated. The  device has reported recent mode switching into atrial fibrillation and  has reported repeated runs of nonsustained ventricular tachycardia, but  there was no treatment needed or documented or delivered by the device. Important to mention that is the first available blood tests that  suggest significant metabolic abnormality with potassium of 6.6, CO2 of  22, anion gap of 18, BUN of 55, creatinine of 8.6, and GFR of 6.   The  first available pH is that of 7.152, CO2 of 66 and pO2 of 439. I am  assuming this was after the intubation of the patient. So, there is  significant amount of electrolyte and pH imbalance during which the  myocardium interface with the lead are not healthy enough. It would be very important and very informative to get the rhythm strips  from the squad to help compare what the monitor documents and what the  ICD reported. [employer] seems to think that their device is doing that  it was supposed to do and there is significant metabolic abnormality  documented by the first sets of labs that were available. The patient apparently was not perfusing for sometime. There is one of  the notes describe more than 10 minutes of no CPR. The gentleman had  recently not been doing well and has COVID. Apparently, he is  definitely tested positive now for COVID. He is intubated. In general,  he is not doing very well. Neurologically, there is reported seizures  and he remains unresponsive. Given the COVID situation, I did not examine the patient myself. I  reviewed the available notes electronically and what is available on the  chart. I reviewed the interrogation of [employer] of their device. We  are doing our best and told the staff of the hospital trying to retrieve  the information from the EMS. The gentleman had history of cardiomyopathy. He had history of coronary  artery disease, prior stents and ICD that was implanted by [facility] in  12/2016. It is a BiV ICD and there are no reports of abnormality of the  ICD functions as detected by [facility] and by the available formation on  the chart other than yesterday's event. There is no obtainable history  at this point. The gentleman went only recently in the past few days prior to the  admission into atrial fibrillation and there were reports of paroxysmal  atrial fibrillation and the gentleman has been on Eliquis before.     The labs were reviewed and their entire available findings. The EKGs  were reviewed. The first available EKG is 01/24/2022 around 2120 p.m.,  which showed P-wave ventricular pacing. I do think the gentleman is in  atrial fibrillation, underlying rhythm, or evidence of AV dissociation  at that point. Subsequently, EKGs showed red documentation of atrial  activity with a proper atrial sensing and ventricular pacing, as that  was the second EKG around 11:11 p.m. and other EKG that was done around  05:49 p.m. IMPRESSION:  At this point, it is not clear to what was the presenting  event. The squad reported delivering three shocks for what they think  it is ventricular fibrillation. The ICD has no documentation from what  [employer] telling us of any events around the time where V-fib was  reported. I am suspicious that the significant metabolic abnormality  secondary to the cardiac arrest, no perfusion, and probably even prior  to that the COVID with the significant renal abnormality that the  interface between the myocardium and the leads was not healthy enough to  conduct the proper information to the device. The device in the past  showed no evidence of malfunction. It detected nonsustained ventricular  tachycardia and detected proper function into that. It detected recent  mode switching. Prognosis is very guarded. At this point, there is reported seizures. The patient is not recovering neurologically and he is sedated. Metabolically also there are significant issues going on and added to  that is the reported COVID. We were trying to get the reports from this squad. It will shed better  lights on the situation. I do not think at this point that testing of  the device is of any value. If he goes into arrest and he is still full  code than the gentleman can be shocked externally and at this point  there is no evidence of pacing issues. Case was discussed repeatedly with [facility] and with Dr. Mela Gardner on few of  occasions.         EMERSON Fredrick Sneed MD    D: 01/25/2022 18:41:54       T: 01/25/2022 18:49:21     [email or web address]  Job#: [id number]     Doc#: [id number]    CC:

## 2022-01-26 NOTE — PROGRESS NOTES
Subjective:   Chief Complaint:Cardiac Arrest    Patient ID: Emily Qiu is a 68 y.o. male followed for   Cardiac arrest  on January 24 and seizures. HPI: Postarrest he began having myoclonic seizure activity almost right away. He was given a gram of levetiracetam and has been continued on 500 mg every 24 hours after dialysis. He remains on propofol and fentanyl. He continues to have seizure activity in the form of neck extension rhythmic eyelid movements upward and oropharyngeal myoclonus every 2 to 3 seconds lasting about 1 to 2 seconds. This is despite being on propofol as well as levetiracetam.  Sedation has not been able to be weaned,    EEG is pending. I note that information has been found that he did not actually receive any shocks in the field. 10 Point ROS not completed as patient is intubated. Past Medical History:   Diagnosis Date    BPH (benign prostatic hypertrophy)     CAD (coronary artery disease)     sees Dr. Erika Kimble [facility]    Cardiomyopathy [facility])     CHF (congestive heart failure) ([facility] [address]     LVEF 40% on echo 6/2014    COPD (chronic obstructive pulmonary disease) ([facility] [address]     has O2 rarely uses, has nebulizer    Hypertension     On home oxygen therapy     Type II or unspecified type diabetes mellitus without mention of complication, not stated as uncontrolled      No past surgical history on file. Patient reports that he has been smoking. He has a 45.00 pack-year smoking history. He has never used smokeless tobacco. He reports current alcohol use. He reports that he does not use drugs.    Family History   Problem Relation Age of Onset    Lung Cancer Mother     Seizures Other      No Known Allergies  Current Facility-Administered Medications   Medication Dose Route Frequency Provider Last Rate Last Admin    lidocaine 2 % injection 5 mL  5 mL IntraDERmal Once ESTHER Molina - CNP        sodium chloride flush 0.9 % injection 5-40 mL  5-40 mL IntraVENous 2 times per day Beckey Kelby, APRN - CNP   10 mL at 01/26/22 1130    sodium chloride flush 0.9 % injection 5-40 mL  5-40 mL IntraVENous PRN Beckey Kelby, APRN - CNP        0.9 % sodium chloride infusion  25 mL IntraVENous PRN Beckey Kelby, APRN - CNP        0.9 % sodium chloride infusion  250 mL IntraVENous Once Beckey Kelby, APRN - CNP        propofol injection  5-50 mcg/kg/min IntraVENous Titrated Joan Cooney MD 24.4 mL/hr at 01/26/22 1243 50 mcg/kg/min at 01/26/22 1243    vancomycin (VANCOCIN) 1,250 mg in dextrose 5 % 250 mL IVPB  1,250 mg IntraVENous Once Torrie Mendoza MD        vancomycin Pawan Loera) intermittent dosing (placeholder)   Other RX Placeholder Torrie Mendoza MD        fentaNYL (SUBLIMAZE) 1,000 mcg in sodium chloride 0.9 % 100 mL infusion  12.5-200 mcg/hr IntraVENous Continuous Torrie Mendoza MD 20 mL/hr at 01/26/22 1200 200 mcg/hr at 01/26/22 1200    heparin (porcine) injection 4,000 Units  4,000 Units IntraVENous PRN Torrie Mendoza MD        heparin (porcine) injection 2,000 Units  2,000 Units IntraVENous PRN Torrie Mendoza MD        heparin (porcine) 25,000 Units in sodium chloride 0.9 % 250 mL infusion  5-30 Units/kg/hr IntraVENous Continuous Torrie Mendoza MD   Stopped at 01/26/22 1018    levETIRAcetam (KEPPRA) 500 mg in sodium chloride 0.9 % 100 mL IVPB  500 mg IntraVENous Daily Huang Foley MD   Paused at 01/26/22 0901    LORazepam (ATIVAN) injection 2 mg  2 mg IntraVENous Q6H PRN Huang Foley MD   2 mg at 01/26/22 0841    pantoprazole (PROTONIX) injection 40 mg  40 mg IntraVENous Daily Beckey Kelby, APRN - CNP   40 mg at 01/26/22 0809    And    sodium chloride (PF) 0.9 % injection 10 mL  10 mL IntraVENous Daily Beckey Kelby, APRN - CNP   10 mL at 01/26/22 0916    insulin lispro (HUMALOG) injection vial 0-12 Units  0-12 Units SubCUTAneous Q4H Beckey Kelby, APRN - CNP   2 Units at 01/26/22 0552    sennosides-docusate sodium (SENOKOT-S) 8.6-50 MG tablet 2 Social History     Tobacco History     Smoking Status  Current Every Day Smoker Smoking Frequency  1 pack/day for 45 years (39 pk yrs)    Smokeless Tobacco Use  Never Used          Alcohol History     Alcohol Use Status  Yes Comment  OCCASIONAL          Drug Use     Drug Use Status  No          Sexual Activity     Sexually Active  Not Asked                  Objective (Physical Exam)   BP (!) 121/52   Pulse 81   Temp 98.6 °F (37 °C)   Resp 22   Ht 5' 7\" (1.702 m)   Wt 177 lb 11.1 oz (80.6 kg)   SpO2 99%   BMI 27.83 kg/m²       Neuro:   Examined on propofol and fentanyl. Pupils were 3 mm and reactive to light. No clear corneal response in clinic to sedation. Every 3 seconds the patient had clear myoclonic myoclonic jerking of the neck eyelids and face lasting about  1 to 2 seconds. Tone continues to be normal/decreased x4 limbs. No clear limb myoclonus at this time. [unfilled]    Most recent    EEG No valid procedures specified. MRI of Brain No results found for this or any previous visit. No results found for this or any previous visit. MRA of the Head and Neck: No results found for this or any previous visit. No results found for this or any previous visit. No results found for this or any previous visit. CT of the Head: Results for orders placed during the hospital encounter of 01/24/22    CT Head WO Contrast    Narrative  EXAMINATION: CT of the brain without contrast    HISTORY: Cardiac arrest. Fall. COMPARISON: None available    TECHNIQUE: Multiple contiguous axial images were obtained of the brain from the skull base through the vertex. Multiplanar reformats were obtained. FINDINGS:    Prominence of the sulci and ventricles compatible with mild generalized parenchymal volume loss.  Areas of bilateral supratentorial white matter hypoattenuation are nonspecific but most likely related to chronic small vessel ischemic changes in a patient  of this age. Gray-white matter differentiation is preserved. No acute hemorrhage or abnormal extra-axial fluid collection. No mass effect or midline shift. The visualized paranasal sinuses and mastoid air cells are clear. Calvarium is intact. Endotracheal tube and orogastric tube partially visualized. Impression  No acute intracranial process. Generalized parenchymal volume loss and nonspecific white matter findings most compatible with chronic small vessel ischemic changes in a patient of this age. All CT scans at this facility use dose modulation, iterative reconstruction, and/or weight based dosing when appropriate to reduce radiation dose to as low as reasonably achievable. No results found for this or any previous visit. No results found for this or any previous visit. Carotid duplex: No results found for this or any previous visit. No results found for this or any previous visit. No results found for this or any previous visit. Echo No results found for this or any previous visit.         Lab Results   Component Value Date    WBC 23.7 01/25/2022    RBC 2.98 01/25/2022    RBC 4.42 05/21/2012    HGB 8.5 01/26/2022    HCT 29.4 01/25/2022    MCV 98.7 01/25/2022    MCH 31.1 01/25/2022    MCHC 31.5 01/25/2022    RDW 17.4 01/25/2022     01/25/2022    MPV 10.4 02/16/2015     Lab Results   Component Value Date     01/26/2022    K 5.6 01/26/2022    CL 89 01/26/2022    CO2 23 01/26/2022    BUN 52 01/26/2022    CREATININE 7.03 01/26/2022    CREATININE 7.2 01/26/2022    GFRAA 9.3 01/26/2022    LABGLOM 7.6 01/26/2022    GLUCOSE 128 01/26/2022    GLUCOSE 76 05/21/2012    PROT 6.3 01/24/2022    LABALBU 3.7 01/24/2022    LABALBU 4.2 05/21/2012    CALCIUM 8.4 01/26/2022    BILITOT 0.4 01/24/2022    ALKPHOS 107 01/24/2022    AST 21 01/24/2022    ALT 18 01/24/2022     Lab Results   Component Value Date    PROTIME 21.6 01/25/2022    INR 1.9 01/25/2022     Lab Results Component Value Date    PRJYITTC25 242 03/12/2013    FOLATE 15.6 03/12/2013    IRON 68 03/12/2013    TIBC 286 03/12/2013     Lab Results   Component Value Date    TRIG 61 02/19/2016    HDL 33 02/19/2016    HDL 31 09/07/2011    LDLCALC 74 02/19/2016     No components found for: A1C  No results found for: LABAMPH, BARBSCNU, LABBENZ, CANNAB, COCAINESCRN, LABMETH, OPIATESCREENURINE, PHENCYCLIDINESCREENURINE, PPXUR, ETOH  No results found for: LITHIUM, DILFRTOT, VALPROATE    Assessment & Plan      60-year-old gentleman who unfortunately has developed postarrest myoclonus. Whereas this is not always associated with negative outcome has been associated with inability to regain consciousness >80% of patients. His pupils constriction responses are still intact. EEG will be helpful in terms of reactivity for prognostication. I will also order an MRI. I am concerned about this gentlemen's prognosis given his refractory seizures and thus I will also add lacosamide 200 mg IV load and 100 twice daily. Total critical care time on this patient, excluding procedures: 33 minutes.

## 2022-01-26 NOTE — CONSULTS
Nutrition Note    hypothermic protocol complete, OK to start TF per discussion in rounds. Start Peptide Based TF (Vital 1.2) @ 20 ml/hr x 24 hrs, 50 ml water flush every 6 hrs.     · Current Tube Feeding (TF) Orders:   · Feeding Route: Orogastric  · Formula: Peptide Based (Vital 1.2)  · Schedule: Continuous @ 20 ml/hr  · Water Flushes: 50 ml every 6 hrs (200 ml)  · Current TF & Flush Orders Provides: 576 kcal, 36 gm protein, ~ 590 ml free water  · Goal TF & Flush Orders Provides: 576 kcal, 36 gm protein, ~ 590 ml free water    Electronically signed by Katherine Tejada RD, LD on 1/26/22 at 3:51 PM EST

## 2022-01-27 NOTE — PROGRESS NOTES
Progress Note  Patient: Lou Mckeon  Unit/Bed: IC16/IC16-01  YOB: 1945  MRN: [id number]  Acct: [de-identified]   Admitting Diagnosis: Cardiac arrest ([facility] [I46.9]  Hyperkalemia [E87.5]  Seizure ([facility] [R56.9]  Injury of head, initial encounter [S09.90XA]  Chronic renal failure syndrome, stage 5 ([facility] [N18.5]  Fracture of tooth (traumatic), initial encounter for closed fracture [S02. 5XXA]  Admit Date:  1/24/2022  Hospital Day: 3    Chief Complaint: CPA    Histories:  Past Medical History:   Diagnosis Date    BPH (benign prostatic hypertrophy)     CAD (coronary artery disease)     [facility]    Cardiomyopathy [facility])     CHF (congestive heart failure) ([facility]     LVEF 40% on echo 6/2014    COPD (chronic obstructive pulmonary disease) ([facility]     has O2 rarely uses, has nebulizer    Hypertension     On home oxygen therapy     Type II or unspecified type diabetes mellitus without mention of complication, not stated as uncontrolled      No past surgical history on file.   Family History   Problem Relation Age of Onset    Lung Cancer Mother     Seizures Other      Social History     Socioeconomic History    Marital status:      Spouse name: Not on file    Number of children: Not on file    Years of education: Not on file    Highest education level: Not on file   Occupational History    Not on file   Tobacco Use    Smoking status: Current Every Day Smoker     Packs/day: 1.00     Years: 45.00     Pack years: 45.00    Smokeless tobacco: Never Used   Substance and Sexual Activity    Alcohol use: Yes     Comment: OCCASIONAL    Drug use: No    Sexual activity: Not on file   Other Topics Concern    Not on file   Social History Narrative    Not on file     Social Determinants of Health     Financial Resource Strain:     Difficulty of Paying Living Expenses: Not on file   Food Insecurity:     Worried About [address] in the Last Year: Not on file    Nayan of Food in the Last Year: Not on file   Transportation Needs:     Lack of Transportation (Medical): Not on file    Lack of Transportation (Non-Medical): Not on file   Physical Activity:     Days of Exercise per Week: Not on file    Minutes of Exercise per Session: Not on file   Stress:     Feeling of Stress : Not on file   Social Connections:     Frequency of Communication with Friends and Family: Not on file    Frequency of Social Gatherings with Friends and Family: Not on file    Attends Latter-day Services: Not on file    Active Member of [address] [place] [address] [employer] or Organizations: Not on file    Attends Club or Organization Meetings: Not on file    Marital Status: Not on file   Intimate Partner Violence:     Fear of Current or Ex-Partner: Not on file    Emotionally Abused: Not on file    Physically Abused: Not on file    Sexually Abused: Not on file   Housing Stability:     Unable to Pay for Housing in the Last Year: Not on file    Number of [place] in the Last Year: Not on file    Unstable Housing in the Last Year: Not on file       Subjective/HPI pt continues have seizures and sedatives are being titrated. Unresponisve. No gag nor corneal reflex. Has had few NSVT overnight. Multiple communications with [facility]/ anabela rep and EP service last PM.      The squad manager was contacted. It now is apparent that the squad did not deliver Defib shocks in the field. Unclear why this was reported differently from the ER that he did receive 3 shocks. 1/17/22 Pt continues to have seizure activity. med titration per Neurology. Unresponsive with no gag reflex. EKG:  Vpaced 72 with underlying AFL. Review of Systems:   Review of Systems  Unable to obtain.      Physical Examination:    BP (!) 121/52   Pulse 61   Temp 96.4 °F (35.8 °C)   Resp 22   Ht 5' 7\" (1.702 m)   Wt 177 lb 11.1 oz (80.6 kg)   SpO2 99%   BMI 27.83 kg/m²        LABS:  CBC:   Lab Results   Component Value Date    WBC 15.8 01/27/2022 RBC 2.67 01/27/2022    RBC 4.42 05/21/2012    HGB 8.5 01/27/2022    HCT 25.8 01/27/2022    MCV 96.6 01/27/2022    MCH 32.0 01/27/2022    MCHC 33.1 01/27/2022    RDW 17.4 01/27/2022     01/27/2022    MPV 10.4 02/16/2015     CBC with Differential:    Lab Results   Component Value Date    WBC 15.8 01/27/2022    RBC 2.67 01/27/2022    RBC 4.42 05/21/2012    HGB 8.5 01/27/2022    HCT 25.8 01/27/2022     01/27/2022    MCV 96.6 01/27/2022    MCH 32.0 01/27/2022    MCHC 33.1 01/27/2022    RDW 17.4 01/27/2022    LYMPHOPCT 2.5 01/25/2022    MONOPCT 3.1 01/25/2022    BASOPCT 0.3 01/25/2022    MONOSABS 0.7 01/25/2022    LYMPHSABS 0.6 01/25/2022    EOSABS 0.0 01/25/2022    BASOSABS 0.1 01/25/2022     CMP:    Lab Results   Component Value Date     01/26/2022    K 5.6 01/26/2022    CL 89 01/26/2022    CO2 23 01/26/2022    BUN 52 01/26/2022    CREATININE 7.03 01/26/2022    CREATININE 7.2 01/26/2022    GFRAA 9.3 01/26/2022    LABGLOM 7.6 01/26/2022    GLUCOSE 128 01/26/2022    GLUCOSE 76 05/21/2012    PROT 6.3 01/24/2022    LABALBU 3.7 01/24/2022    LABALBU 4.2 05/21/2012    CALCIUM 8.4 01/26/2022    BILITOT 0.4 01/24/2022    ALKPHOS 107 01/24/2022    AST 21 01/24/2022    ALT 18 01/24/2022     BMP:    Lab Results   Component Value Date     01/26/2022    K 5.6 01/26/2022    CL 89 01/26/2022    CO2 23 01/26/2022    BUN 52 01/26/2022    LABALBU 3.7 01/24/2022    LABALBU 4.2 05/21/2012    CREATININE 7.03 01/26/2022    CREATININE 7.2 01/26/2022    CALCIUM 8.4 01/26/2022    GFRAA 9.3 01/26/2022    LABGLOM 7.6 01/26/2022    GLUCOSE 128 01/26/2022    GLUCOSE 76 05/21/2012     Magnesium:    Lab Results   Component Value Date    MG 2.3 01/26/2022     Troponin:    Lab Results   Component Value Date    TROPONINI 0.315 01/26/2022        Active Hospital Problems    Diagnosis Date Noted    Cardiac arrest [facility]) [I46.9] 01/24/2022     Priority: Low        Assessment/Plan:  1.  CPA - Metabolic and Respiratory related and not primarily a cardiac foci. 2. It is apparent the BiV - ICD device functioned normally and did not deliver inappropriate Treatment. 3. COVID PNA- with respiratory failure  4. PAF/AFL- will need SQ Heparin. 5. MINAL/Hyper K- Rx during HD  6. CAD- prior CAD. 7. LE Duplex- no DVT. 8. Echo - LVEF 15%  9. Pt is critically ill. Prognosis appears poor.         Electronically signed by Danna Nolan MD on 1/27/2022 at 11:44 AM

## 2022-01-27 NOTE — PROGRESS NOTES
Subjective:   Chief Complaint:Cardiac Arrest    Patient ID: Chrissie Hurtado is a 68 y.o. male followed for post cardiac arrest on June 24 and myoclonic seizures. HPI: He continues to have regular myoclonus activity every 1 to 2 seconds. He is now on Versed as well as fentanyl. He is on levetiracetam.  EEG was done this morning. Full report is pending but it showed no discernible background activity and myoclonic jerks every 1 to 3 seconds electrographically and clinically      Past Medical History:   Diagnosis Date    BPH (benign prostatic hypertrophy)     CAD (coronary artery disease)     se[facility] Dr. [facility]a Osborne in [facility]    Cardiomyopathy [facility])     CHF (congestive heart failure) ([facility]     LVEF 40% on echo 6/2014    COPD (chronic obstructive pulmonary disease) ([facility]     has O2 rarely uses, has nebulizer    Hypertension     On home oxygen therapy     Type II or unspecified type diabetes mellitus without mention of complication, not stated as uncontrolled      No past surgical history on file. Patient reports that he has been smoking. He has a 45.00 pack-year smoking history. He has never used smokeless tobacco. He reports current alcohol use. He reports that he does not use drugs.    Family History   Problem Relation Age of Onset    Lung Cancer Mother     Seizures Other      No Known Allergies  Current Facility-Administered Medications   Medication Dose Route Frequency Provider Last Rate Last Admin    lidocaine 2 % injection 5 mL  5 mL IntraDERmal Once Jamel Landau, APRN - CNP        sodium chloride flush 0.9 % injection 5-40 mL  5-40 mL IntraVENous 2 times per day Jamel Landau, APRN - CNP   10 mL at 01/26/22 2018    sodium chloride flush 0.9 % injection 5-40 mL  5-40 mL IntraVENous PRN Jamel Landau, APRN - CNP        0.9 % sodium chloride infusion  25 mL IntraVENous PRN Jamel Landau, APRN - CNP        0.9 % sodium chloride infusion  250 mL IntraVENous Once Jamel Landau, APRN - CNP  propofol injection  5-50 mcg/kg/min IntraVENous Titrated Agustín Delacruz MD 24.4 mL/hr at 01/27/22 0619 50 mcg/kg/min at 01/27/22 6469    lacosamide (VIMPAT) 100 mg in dextrose 5 % 60 mL IVPB  100 mg IntraVENous BID Bree Hanna MD   Stopped at 01/27/22 1645    midazolam (VERSED) 100 mg in dextrose 5 % 100 mL infusion  1-10 mg/hr IntraVENous Continuous Evalene Terri, APRN - CNP 6 mL/hr at 01/27/22 0617 6 mg/hr at 01/27/22 0617    heparin (porcine) 25,000 Units in dextrose 5 % 250 mL infusion  5-30 Units/kg/hr IntraVENous Continuous Jacob Thao MD 2.4 mL/hr at 01/27/22 0323 3 Units/kg/hr at 01/27/22 0323    vancomycin (VANCOCIN) intermittent dosing (placeholder)   Other RX Placeholder Neale Lombard, MD        fentaNYL (SUBLIMAZE) 1,000 mcg in sodium chloride 0.9 % 100 mL infusion  12.5-200 mcg/hr IntraVENous Continuous Neale Lombard, MD 20 mL/hr at 01/27/22 0410 200 mcg/hr at 01/27/22 0410    heparin (porcine) injection 4,000 Units  4,000 Units IntraVENous PRN Neale Lombard, MD        heparin (porcine) injection 2,000 Units  2,000 Units IntraVENous PRN Neale Lombard, MD        levETIRAcetam (KEPPRA) 500 mg in sodium chloride 0.9 % 100 mL IVPB  500 mg IntraVENous Daily Pushpa Naranjo MD   Stopped at 01/27/22 0915    LORazepam (ATIVAN) injection 2 mg  2 mg IntraVENous Q6H PRN Pushpa Naranjo MD   2 mg at 01/26/22 0841    pantoprazole (PROTONIX) injection 40 mg  40 mg IntraVENous Daily Evalene Terri, APRN - CNP   40 mg at 01/27/22 0914    And    sodium chloride (PF) 0.9 % injection 10 mL  10 mL IntraVENous Daily Evalene Terri, APRN - CNP   10 mL at 01/26/22 0916    insulin lispro (HUMALOG) injection vial 0-12 Units  0-12 Units SubCUTAneous Q4H Evalene Terri, APRN - CNP   2 Units at 01/27/22 0410    sennosides-docusate sodium (SENOKOT-S) 8.6-50 MG tablet 2 tablet  2 tablet Oral BID ESTHER Acevedo - CNP   2 tablet at 01/26/22 0809    glucose (GLUTOSE) 40 % oral gel 15 g  15 g Oral PRN Craig Murray, APRN - CNP        dextrose 50 % IV solution  12.5 g IntraVENous PRN Macel Macleod, APRN - CNP        glucagon (rDNA) injection 1 mg  1 mg IntraMUSCular PRN Macel Macleod, APRN - CNP        dextrose 5 % solution  100 mL/hr IntraVENous PRN Macel Macleod, APRN - CNP        norepinephrine (LEVOPHED) 16 mg in dextrose 5 % 250 mL infusion  2-100 mcg/min IntraVENous Continuous Redd Guzmán MD 13.1 mL/hr at 01/27/22 0323 14 mcg/min at 01/27/22 0323    vasopressin 20 Units in dextrose 5 % 100 mL infusion  0.01-0.04 Units/min IntraVENous Continuous Redd Guzmán MD   Stopped at 01/25/22 0957    sodium chloride flush 0.9 % injection 5-40 mL  5-40 mL IntraVENous 2 times per day Redd Guzmán MD   10 mL at 01/26/22 2017    sodium chloride flush 0.9 % injection 5-40 mL  5-40 mL IntraVENous PRN Redd Guzmán MD        0.9 % sodium chloride infusion  25 mL IntraVENous PRN Redd Guzmán MD        ondansetron (ZOFRAN-ODT) disintegrating tablet 4 mg  4 mg Oral Q8H PRN Redd Guzmán MD        Or    ondansetron [facility]) injection 4 mg  4 mg IntraVENous Q6H PRN Redd Guzmán MD        polyethylene glycol (GLYCOLAX) packet 17 g  17 g Oral Daily PRN Redd Guzmán MD        acetaminophen (TYLENOL) tablet 650 mg  650 mg Oral Q6H PRN Redd Guzmán MD        Or    acetaminophen (TYLENOL) suppository 650 mg  650 mg Rectal Q6H PRN Redd Guzmán MD        piperacillin-tazobactam (ZOSYN) 2,250 mg in dextrose 5 % 50 mL IVPB (mini-bag)  2,250 mg IntraVENous Q8H Redd Guzmán MD   Stopped at 01/27/22 0403    dexamethasone (DECADRON) injection 6 mg  6 mg IntraVENous Q12H Redd Guzmán MD   6 mg at 01/26/22 2216    chlorhexidine (PERIDEX) 0.12 % solution 15 mL  15 mL Mouth/Throat BID Redd Guzmán MD   15 mL at 01/27/22 0914      Problem List     Cardiac arrest [facility] - [facility]         Social History     Tobacco History     Smoking Status  Current Every Day Smoker Smoking Frequency  1 pack/day for 45 years (45 pk yrs)    Smokeless Tobacco Use  Never Used          Alcohol History     Alcohol Use Status  Yes Comment  OCCASIONAL          Drug Use     Drug Use Status  No          Sexual Activity     Sexually Active  Not Asked                  Objective (Physical Exam)   BP (!) 121/52   Pulse 73   Temp 96.4 °F (35.8 °C)   Resp 22   Ht 5' 7\" (1.702 m)   Wt 177 lb 11.1 oz (80.6 kg)   SpO2 98%   BMI 27.83 kg/m²         Neuro:   Pupils were 3 mm and reactive to light. Ongoing decreased tone x4 limbs. Ongoing myoclonic jerking  [unfilled]    Most recent    EEG No valid procedures specified. MRI of Brain No results found for this or any previous visit. No results found for this or any previous visit. MRA of the Head and Neck: No results found for this or any previous visit. No results found for this or any previous visit. No results found for this or any previous visit. CT of the Head: Results for orders placed during the hospital encounter of 01/24/22    CT Head WO Contrast    Narrative  EXAMINATION: CT of the brain without contrast    HISTORY: Cardiac arrest. Fall. COMPARISON: None available    TECHNIQUE: Multiple contiguous axial images were obtained of the brain from the skull base through the vertex. Multiplanar reformats were obtained. FINDINGS:    Prominence of the sulci and ventricles compatible with mild generalized parenchymal volume loss. Areas of bilateral supratentorial white matter hypoattenuation are nonspecific but most likely related to chronic small vessel ischemic changes in a patient  of this age. Gray-white matter differentiation is preserved. No acute hemorrhage or abnormal extra-axial fluid collection. No mass effect or midline shift. The visualized paranasal sinuses and mastoid air cells are clear. Calvarium is intact. Endotracheal tube and orogastric tube partially visualized. Impression  No acute intracranial process.     Generalized parenchymal volume loss and nonspecific white matter findings most compatible with chronic small vessel ischemic changes in a patient of this age. All CT scans at this facility use dose modulation, iterative reconstruction, and/or weight based dosing when appropriate to reduce radiation dose to as low as reasonably achievable. No results found for this or any previous visit. No results found for this or any previous visit. Carotid duplex: No results found for this or any previous visit. No results found for this or any previous visit. No results found for this or any previous visit. Echo No results found for this or any previous visit.         Lab Results   Component Value Date    WBC 15.8 01/27/2022    RBC 2.67 01/27/2022    RBC 4.42 05/21/2012    HGB 8.5 01/27/2022    HCT 25.8 01/27/2022    MCV 96.6 01/27/2022    MCH 32.0 01/27/2022    MCHC 33.1 01/27/2022    RDW 17.4 01/27/2022     01/27/2022    MPV 10.4 02/16/2015     Lab Results   Component Value Date     01/26/2022    K 5.6 01/26/2022    CL 89 01/26/2022    CO2 23 01/26/2022    BUN 52 01/26/2022    CREATININE 7.03 01/26/2022    CREATININE 7.2 01/26/2022    GFRAA 9.3 01/26/2022    LABGLOM 7.6 01/26/2022    GLUCOSE 128 01/26/2022    GLUCOSE 76 05/21/2012    PROT 6.3 01/24/2022    LABALBU 3.7 01/24/2022    LABALBU 4.2 05/21/2012    CALCIUM 8.4 01/26/2022    BILITOT 0.4 01/24/2022    ALKPHOS 107 01/24/2022    AST 21 01/24/2022    ALT 18 01/24/2022     Lab Results   Component Value Date    PROTIME 21.6 01/25/2022    INR 1.9 01/25/2022     Lab Results   Component Value Date    JRLYVFGV20 242 03/12/2013    FOLATE 15.6 03/12/2013    IRON 68 03/12/2013    TIBC 286 03/12/2013     Lab Results   Component Value Date    TRIG 61 02/19/2016    HDL 33 02/19/2016    HDL 31 09/07/2011    LDLCALC 74 02/19/2016     No components found for: A1C  No results found for: [address] , [facility], LABBENZ, [address], [address] LABMETH, OPIATESCREENURINE, PHENCYCLIDINESCREENURINE, PPXUR, ETOH  No results found for: LITHIUM, DILFRTOT, VALPROATE    Assessment & Plan          Chart, labs,and relevant images reviewed. Postarrest patient who is continuing to have myoclonic status despite lacosamide, levetiracetam, Versed his EEG showed no discernible background other than seizures. There was no response to use stimulation or reactivity during the EEG which is associated with a lack of progression to wakefulness postarrest.  EEG was done off sedation. Repeat CT head tomorrow. Not able to get MRI due to ICD. I am available to discuss with family if this is helpful in making decisions going forward.      Critical care time 35 minutes

## 2022-01-27 NOTE — PROGRESS NOTES
End of shift summary: See flowsheets for assessment, see eMAR for medications. Patient had PICC line placed Right upper arm. Right groin CVC removed. Linens changed. Patient having seizure activity with minimal stimulation. Family updated by this RN. [facility] notified per protocol.  .Electronically signed by Kim Ross RN on 1/26/2022 at 7:37 PM

## 2022-01-27 NOTE — PROGRESS NOTES
Patient ID:  Mayra Vail  [id number]  68 y.o.  1945    Assumed Care of Patient. Disaster Documentation initiated as per policy / SOP. Report Received from MEREDITH Small. Assessment Complete, please see flow sheets. Labs, orders, plan of care and meds reviewed. Daughter Campos Alejandro at the bedside for updates and education. Labs:   Recent Labs     01/24/22  1800 01/24/22  1913 01/25/22  0648 01/25/22  0820 01/25/22  1617 01/25/22  2201 01/26/22  0520   WBC 13.9*  --  23.7*  --   --   --   --    HGB 9.4*   < > 9.3*   < > 9.5* 9.4* 8.5*   HCT 30.7*  --  29.4*  --   --   --   --      --  173  --   --   --   --     < > = values in this interval not displayed. Recent Labs     01/25/22  2000 01/25/22 2201 01/26/22  0157 01/26/22  0520 01/26/22  0600 01/26/22  0800   *  --  128*  --   --  128*   K 5.4*   < > 5.4*  --  5.7* 5.6*   CL 90*  --  89*  --   --  89*   CO2 25  --  23  --   --  23   BUN 46*  --  49*  --   --  52*   CREATININE 6.63*   < > 6.80* 7.2*  --  7.03*   CALCIUM 8.4*  --  8.4*  --   --  8.4*   PHOS 4.6  --  5.0*  --   --  5.5*    < > = values in this interval not displayed.      Recent Labs     01/24/22  1800   AST 21   ALT 18   BILITOT 0.4   ALKPHOS 107*               Electronically signed by Monisha Chen RN

## 2022-01-27 NOTE — PROGRESS NOTES
Progress Note  Date:2022       Room:[place] [address]-  Patient Kate Pryor     YOB: 1945     Age:76 y.o. Subjective    Subjective     Review of Systems  ROS: could not be obtained bc pt is intubated  Objective         Vitals Last 24 Hours:  TEMPERATURE:  Temp  Av.4 °F (36.3 °C)  Min: 96.3 °F (35.7 °C)  Max: 98.6 °F (37 °C)  RESPIRATIONS RANGE: Resp  Av  Min: 0  Max: 33  PULSE OXIMETRY RANGE: SpO2  Av.5 %  Min: 97 %  Max: 100 %  PULSE RANGE: Pulse  Av.4  Min: 60  Max: 85  BLOOD PRESSURE RANGE: No data recorded.  ; No data recorded. I/O (24Hr): Intake/Output Summary (Last 24 hours) at 2022 1242  Last data filed at 2022 0630  Gross per 24 hour   Intake 2335.7 ml   Output 535 ml   Net 1800.7 ml     Objective:  General Appearance:  Ill-appearing. Vital signs: (most recent): Blood pressure (!) 121/52, pulse 61, temperature 96.4 °F (35.8 °C), resp. rate 22, height 5' 7\" (1.702 m), weight 177 lb 11.1 oz (80.6 kg), SpO2 99 %. HEENT: (+ ET tube, neck is supple)    Heart: S1 normal and S2 normal.    Abdomen: Abdomen is soft. Bowel sounds are normal.   There is no epigastric area or suprapubic area tenderness. Pulses: Distal pulses are intact. Neurological: (Sedated). Skin:  Warm and dry. Labs/Imaging/Diagnostics    Labs:  CBC:  Recent Labs     22  1800 22  1913 22  0648 22  0820 22  2201 22  0520 22  0323   WBC 13.9*  --  23.7*  --   --   --  15.8*   RBC 2.98*  --  2.98*  --   --   --  2.67*   HGB 9.4*   < > 9.3*   < > 9.4* 8.5* 8.5*   HCT 30.7*  --  29.4*  --   --   --  25.8*   .8*  --  98.7  --   --   --  96.6   RDW 17.5*  --  17.4*  --   --   --  17.4*     --  173  --   --   --  140    < > = values in this interval not displayed.      CHEMISTRIES:  Recent Labs     22  2201 22  0157 22  0520 22  0600 22  0800   *  --  128*  --   -- 128*   K 5.4*   < > 5.4*  --  5.7* 5.6*   CL 90*  --  89*  --   --  89*   CO2 25  --  23  --   --  23   BUN 46*  --  49*  --   --  52*   CREATININE 6.63*   < > 6.80* 7.2*  --  7.03*   GLUCOSE 129*  --  135*  --   --  128*   PHOS 4.6  --  5.0*  --   --  5.5*   MG 2.2  --  2.3  --   --  2.3    < > = values in this interval not displayed. PT/INR:  Recent Labs     01/24/22  1800 01/25/22  0100 01/25/22  0647   PROTIME 17.9* 16.9* 21.6*   INR 1.5 1.4 1.9     APTT:  Recent Labs     01/25/22  0100 01/25/22  0647   APTT 45.1* >150.0*     LIVER PROFILE:  Recent Labs     01/24/22  1800   AST 21   ALT 18   BILITOT 0.4   ALKPHOS 107*       Imaging Last 24 Hours:  CT Head WO Contrast    Result Date: 1/25/2022  EXAMINATION: CT of the brain without contrast HISTORY: Cardiac arrest. Fall. COMPARISON: None available TECHNIQUE: Multiple contiguous axial images were obtained of the brain from the skull base through the vertex. Multiplanar reformats were obtained. FINDINGS: Prominence of the sulci and ventricles compatible with mild generalized parenchymal volume loss. Areas of bilateral supratentorial white matter hypoattenuation are nonspecific but most likely related to chronic small vessel ischemic changes in a patient of this age. Gray-white matter differentiation is preserved. No acute hemorrhage or abnormal extra-axial fluid collection. No mass effect or midline shift. The visualized paranasal sinuses and mastoid air cells are clear. Calvarium is intact. Endotracheal tube and orogastric tube partially visualized. No acute intracranial process. Generalized parenchymal volume loss and nonspecific white matter findings most compatible with chronic small vessel ischemic changes in a patient of this age. All CT scans at this facility use dose modulation, iterative reconstruction, and/or weight based dosing when appropriate to reduce radiation dose to as low as reasonably achievable.      CT CERVICAL SPINE WO CONTRAST    Result Date: 1/25/2022  EXAM: CT CERVICAL SPINE WO CONTRAST COMPARISON: None available HISTORY:  Cardiac arrest. Fall. TECHNIQUE: Multiple contiguous axial CT images of the cervical spine were obtained. Multiplanar reformats performed. FINDINGS:  Endotracheal tube is present. A radiopaque foreign body is present along the left lateral aspect of the endotracheal tube at the S2 level. Orogastric tube is present and partially visualized. No acute fracture or malalignment. Atlanto-occipital articulation is maintained. Atlantodental interval is preserved. Cervical spinal vertebral body heights are preserved. Degenerative disc disease at C5-6 and C6-7. Mild multilevel facet arthropathy. Suspect at least mild spinal canal stenosis at C5-6 and C6-7. Varying degrees of osseous neuroforaminal stenosis throughout the cervical spine. There is no prevertebral soft tissue swelling. Areas of septal thickening and small ground glass opacities of both lung apices are nonspecific. Atherosclerotic calcification at the carotid bulbs. No acute fracture or malalignment. 2 cm foreign body along the left aspect of the endotracheal tube at the C2 level. Septal thickening and small groundglass opacities of the lung apices may represent pulmonary edema or may be infectious/inflammatory in etiology. All CT scans at this facility use dose modulation, iterative reconstruction, and/or weight based dosing when appropriate to reduce radiation dose to as low as reasonably achievable.        Past Medical History:   Diagnosis Date    BPH (benign prostatic hypertrophy)     CAD (coronary artery disease)     sees Dr. [facility] in [facility]    Cardiomyopathy [facility])     CHF (congestive heart failure) ([facility]     LVEF 40% on echo 6/2014    COPD (chronic obstructive pulmonary disease) ([facility]     has O2 rarely uses, has nebulizer    Hypertension     On home oxygen therapy     Type II or unspecified type diabetes mellitus without mention of complication, not stated as uncontrolled        Assessment//Plan           Hospital Problems           Last Modified POA    Cardiac arrest ([facility] 1/24/2022 Yes      r/o seizures  Twitching  vfib  ESRD  COPD  ESRD  PAF  CAD    Assessment & Plan    1/25: Continue with hypothermic protocol follow-up cardiology and nephrology evaluation. Follow-up neurology, start [address] for twitching possible seizures, will get EEG done. Ativan as needed for seizures. Monitor labs. Spoke with nursing about the care, prognosis is guarded, palliative care  1/26: Patient getting HD today. Follow-up neurology for repeating CT scan and possible evaluation for anoxic brain injury. EP has seen the patient, echo patient has left ventricular EF of 15%. Prognosis guarded. Follow-up potassium level. Follow-up renal for maintenance dialysis and hyperkalemia. 1/27: Patient getting EEG done. Prognosis poor. Continue with current management. Follow-up palliative care. Spoke with nursing about the care Fu neuro about ?  Anoxic brain injury  active electronically signed by Matt Romero MD on 1/25/22 at 9:21 AM EST

## 2022-01-27 NOTE — PROGRESS NOTES
Nephrology Progress Note    Assessment:  MINAL  Hypotension  Hypoxic encephalopathy  DM type-2  Respiratory arrest in field      Plan:  EEG planned  Dialysis tomorrow    Patient Active Problem List:     Hypertension     COPD (chronic obstructive pulmonary disease) ([facility])     Benign prostatic hyperplasia     CAD (coronary artery disease)     Type II or unspecified type diabetes mellitus without mention of complication, not stated as uncontrolled     Cardiomyopathy ([facility] [address]     Hyperlipidemia     Neuropathy     CKD (chronic kidney disease) stage 3, GFR 30-59 ml/min ([facility])     Tobacco abuse     Diabetic neuropathy ([facility])     Cardiac arrest ([facility] [address]      Subjective:  Admit Date: 1/24/2022    Interval History: no response     Medications:  Scheduled Meds:   lidocaine  5 mL IntraDERmal Once    sodium chloride flush  5-40 mL IntraVENous 2 times per day    lacosamide (VIMPAT) IVPB  100 mg IntraVENous BID    vancomycin (VANCOCIN) intermittent dosing (placeholder)   Other RX Placeholder    levetiracetam  500 mg IntraVENous Daily    pantoprazole  40 mg IntraVENous Daily    And    sodium chloride (PF)  10 mL IntraVENous Daily    insulin lispro  0-12 Units SubCUTAneous Q4H    sennosides-docusate sodium  2 tablet Oral BID    sodium chloride flush  5-40 mL IntraVENous 2 times per day    piperacillin-tazobactam  2,250 mg IntraVENous Q8H    dexamethasone  6 mg IntraVENous Q12H    chlorhexidine  15 mL Mouth/Throat BID     Continuous Infusions:   sodium chloride      sodium chloride      propofol 50 mcg/kg/min (01/27/22 0619)    midazolam 6 mg/hr (01/27/22 0617)    heparin (PORCINE) Infusion 3 Units/kg/hr (01/27/22 0323)    fentaNYL (SUBLIMAZE) infusion 200 mcg/hr (01/27/22 0410)    dextrose      norepinephrine (LEVOPHED) infusion 14 mcg/min (01/27/22 0323)    vasopressin (Septic Shock) infusion Stopped (01/25/22 0957)    sodium chloride         CBC:   Recent Labs     01/25/22  0648 01/25/22  0820 01/26/22  0520 01/27/22  0323   WBC 23.7*  --   --  15.8*   HGB 9.3*   < > 8.5* 8.5*     --   --  140    < > = values in this interval not displayed. CMP:    Recent Labs     01/25/22  2000 01/25/22  2201 01/26/22  0157 01/26/22  0520 01/26/22  0600 01/26/22  0800   *  --  128*  --   --  128*   K 5.4*   < > 5.4*  --  5.7* 5.6*   CL 90*  --  89*  --   --  89*   CO2 25  --  23  --   --  23   BUN 46*  --  49*  --   --  52*   CREATININE 6.63*   < > 6.80* 7.2*  --  7.03*   GLUCOSE 129*  --  135*  --   --  128*   CALCIUM 8.4*  --  8.4*  --   --  8.4*   LABGLOM 8.2*   < > 7.9* 7*  --  7.6*    < > = values in this interval not displayed. Troponin:   Recent Labs     01/26/22  0600   TROPONINI 0.315*     BNP: No results for input(s): BNP in the last 72 hours. INR:   Recent Labs     01/25/22  0647   INR 1.9     Lipids: No results for input(s): CHOL, LDLDIRECT, TRIG, HDL, AMYLASE, LIPASE in the last 72 hours. Liver:   Recent Labs     01/24/22  1800   AST 21   ALT 18   ALKPHOS 107*   PROT 6.3   LABALBU 3.7   BILITOT 0.4     Iron:  No results for input(s): IRONS, FERRITIN in the last 72 hours. Invalid input(s): LABIRONS  Urinalysis: No results for input(s): UA in the last 72 hours.     Objective:  Vitals: BP (!) 121/52   Pulse 61   Temp 96.4 °F (35.8 °C)   Resp 22   Ht 5' 7\" (1.702 m)   Wt 177 lb 11.1 oz (80.6 kg)   SpO2 99%   BMI 27.83 kg/m²    Wt Readings from Last 3 Encounters:   01/26/22 177 lb 11.1 oz (80.6 kg)   10/27/21 160 lb (72.6 kg)   06/16/16 176 lb (79.8 kg)      24HR INTAKE/OUTPUT:      Intake/Output Summary (Last 24 hours) at 1/27/2022 0847  Last data filed at 1/27/2022 0630  Gross per 24 hour   Intake 2836.39 ml   Output 1935 ml   Net 901.39 ml       General: not alert, in no apparent distress  HEENT: normocephalic, atraumatic, anicteric  Neck: supple, no mass  Lungs: non-labored respirations, clear to auscultation bilaterally  Heart: regular rate and rhythm, no murmurs or rubs  Abdomen: soft, non-tender, non-distended  Ext: no cyanosis, no peripheral edema  Neuro: alert and oriented, no gross abnormalities  Psych: normal mood and affect  Skin: no rash      Electronically signed by Tete Alvarado DO, MD

## 2022-01-27 NOTE — PROGRESS NOTES
Critical Care Progress Note    2022 2:11 PM    Subjective:   Admit Date: 2022  PCP: Yandy Alvarez MD    Chief Complaint   Patient presents with    Cardiac Arrest     Interval History: Has been taken off sedation this morning for EEG. On low ventilator settings. Continues to require vasopressors.   Had no seizure activities this AM.       Medications:   Scheduled Meds:   lidocaine  5 mL IntraDERmal Once    sodium chloride flush  5-40 mL IntraVENous 2 times per day    lacosamide (VIMPAT) IVPB  100 mg IntraVENous BID    vancomycin (VANCOCIN) intermittent dosing (placeholder)   Other RX Placeholder    levetiracetam  500 mg IntraVENous Daily    pantoprazole  40 mg IntraVENous Daily    And    sodium chloride (PF)  10 mL IntraVENous Daily    insulin lispro  0-12 Units SubCUTAneous Q4H    sennosides-docusate sodium  2 tablet Oral BID    sodium chloride flush  5-40 mL IntraVENous 2 times per day    piperacillin-tazobactam  2,250 mg IntraVENous Q8H    dexamethasone  6 mg IntraVENous Q12H    chlorhexidine  15 mL Mouth/Throat BID     Continuous Infusions:   sodium chloride      sodium chloride      propofol 50 mcg/kg/min (2219)    midazolam 6 mg/hr (22 06)    heparin (PORCINE) Infusion 3 Units/kg/hr (22 0323)    fentaNYL (SUBLIMAZE) infusion 200 mcg/hr (22 0410)    dextrose      norepinephrine (LEVOPHED) infusion 14 mcg/min (22)    vasopressin (Septic Shock) infusion Stopped (22 0957)    sodium chloride           Objective:   Vitals:   Temp (24hrs), Av.4 °F (36.3 °C), Min:96.3 °F (35.7 °C), Max:98.6 °F (37 °C)    BP (!) 121/52   Pulse 61   Temp 96.4 °F (35.8 °C)   Resp 22   Ht 5' 7\" (1.702 m)   Wt 177 lb 11.1 oz (80.6 kg)   SpO2 99%   BMI 27.83 kg/m²   I/O:24HR INTAKE/OUTPUT:      Intake/Output Summary (Last 24 hours) at 2022 1411  Last data filed at 2022 0630  Gross per 24 hour   Intake 2335.7 ml   Output 535 ml   Net 1800.7 ml     01/26 0701 - 01/27 0700  In: 2836.4 [I.V.:1760.2]  Out: 1935 [Urine:85]  CVP:             Ventilator Settings:  Vent Mode: AC/VC  Rate Set: 22 bmp   Vt Ordered: 400 mL       PEEP/CPAP: 5   FiO2 : 50 %     Physical Exam:   General appearance -ill-appearing  Mental status-  intubated and sedated  Eyes - pupils equal and reactive, extraocular eye movements intact  Nose - normal and patent  Mouth-ET tube in place. Neck - supple, no significant adenopathy  Chest - clear to auscultationsymmetric air entry  Heart -, regular rhythm, normal rate, S1, S2, no murmurs, rubs, clicks or gallops  Abdomen - soft, nontender, nondistended, no masses or organomegaly  Rectal - deferred, not clinically indicated  Neurological -intubated and sedated. Musculoskeletal - no joint tenderness, deformity or swelling  Extremities - peripheral pulses normal, no pedal edema, no clubbing or cyanosis  Skin - normal coloration and turgor, no rashes                BMP:    Recent Labs     01/25/22 2000 01/25/22 2201 01/26/22  0157 01/26/22  0157 01/26/22  0520 01/26/22  0600 01/26/22  0800   *   < > 128*  --   --   --  128*   K 5.4*   < > 5.4*   < >  --  5.7* 5.6*   CL 90*   < > 89*  --   --   --  89*   CO2 25  --  23  --   --   --  23   BUN 46*   < > 49*  --   --   --  52*   CREATININE 6.63*   < > 6.80*  --  7.2*  --  7.03*   GLUCOSE 129*   < > 135*  --   --   --  128*    < > = values in this interval not displayed. .  MG:3,PHOS:3)@  Ionized Calcium: No results found for: IONCA  CBC:   Recent Labs     01/25/22  0648 01/25/22  0820 01/26/22  0520 01/27/22  0323   WBC 23.7*  --   --  15.8*   HGB 9.3*   < > 8.5* 8.5*     --   --  140    < > = values in this interval not displayed. ABG: No results for input(s): PH, PCO2, PO2 in the last 72 hours. Assessment and Plan:       Impression:     -CP arrest, ? Etiology, cardiac vs respiratory. -Ventricular tachycardia. Per EMS, had less than 30 seconds of V. tach. Did not need shock.   -Acute respiratory failure requiring mechanical ventilation. Currently on low vent settings  - persistent seizure activities , myoclonus . This has improved with sedation and antiepileptic. -COVID-19 pneumonia.  -Circulatory shock. Currently on  pressors.  -Lactic acidosis due to hypoperfusion. This has resolved. -Hyperkalemia and mild hyponatremia. Both stable.   -Leukocytosis, likely reactive. Consider aspiration pneumonitis.  -Anemia, chronic.     Recommendations:     -Continue current care in the ICU for hemodynamic and airway monitoring.  -Continue ventilator bundle. Is currently on 50% FiO2.  -For COVID-19 pneumonia, continue steroids.    -Continue anticoagulation. He has been on Eliquis before this. Less likely VTE.  -Continue vasopressors. Goal map above 65.  -Continue hypothermia protocol.  -Reasonable to cover for aspiration with antibiotic.  -Dialysis per nephrology.  -Interrogation of his defibrillator.  -Poor prognosis. neurology consulted. EEG this morning. .            Full Code      Excluding procedures, the total critical care time caring for this patient with life threatening, unstable organ failure, including direct patient contact, review of medical record, management of life support systems, review of data including imaging and labs, discussions with other team members, patient's family and physicians at least 31 minutes so far today.         Electronically signed by Omega Santana MD on 1/27/2022 at 2:11 PM

## 2022-01-27 NOTE — CARE COORDINATION
TEAM ICU ROUNDS DONE THIS AM. EEG DONE THIS AM . PT CONTINUES ON VENT REQUIRING ICU CARE AND MONITORING.

## 2022-01-28 NOTE — PROGRESS NOTES
Progress Note  Patient: Gary Johnson  Unit/Bed: IC16/IC16-01  YOB: 1945  MRN: [id number]  Acct: [de-identified]   Admitting Diagnosis: Cardiac arrest ([facility] [I46.9]  Hyperkalemia [E87.5]  Seizure ([facility] [R56.9]  Injury of head, initial encounter [S09.90XA]  Chronic renal failure syndrome, stage 5 ([facility] [N18.5]  Fracture of tooth (traumatic), initial encounter for closed fracture [S02. 5XXA]  Admit Date:  1/24/2022  Hospital Day: 4    Chief Complaint: CPA    Histories:  Past Medical History:   Diagnosis Date    BPH (benign prostatic hypertrophy)     CAD (coronary artery disease)     [facility] F[facility]    Cardiomyopathy [facility])     CHF (congestive heart failure) ([facility]     LVEF 40% on echo 6/2014    COPD (chronic obstructive pulmonary disease) ([facility]     has O2 rarely uses, has nebulizer    Hypertension     On home oxygen therapy     Type II or unspecified type diabetes mellitus without mention of complication, not stated as uncontrolled      No past surgical history on file.   Family History   Problem Relation Age of Onset    Lung Cancer Mother     Seizures Other      Social History     Socioeconomic History    Marital status:      Spouse name: Not on file    Number of children: Not on file    Years of education: Not on file    Highest education level: Not on file   Occupational History    Not on file   Tobacco Use    Smoking status: Current Every Day Smoker     Packs/day: 1.00     Years: 45.00     Pack years: 45.00    Smokeless tobacco: Never Used   Substance and Sexual Activity    Alcohol use: Yes     Comment: OCCASIONAL    Drug use: No    Sexual activity: Not on file   Other Topics Concern    Not on file   Social History Narrative    Not on file     Social Determinants of Health     Financial Resource Strain:     Difficulty of Paying Living Expenses: Not on file   Food Insecurity:     Worried About [address] in the Last Year: Not on file    Nayan of Food in the Last Year: Not on file   Transportation Needs:     Lack of Transportation (Medical): Not on file    Lack of Transportation (Non-Medical): Not on file   Physical Activity:     Days of Exercise per Week: Not on file    Minutes of Exercise per Session: Not on file   Stress:     Feeling of Stress : Not on file   Social Connections:     Frequency of Communication with Friends and Family: Not on file    Frequency of Social Gatherings with Friends and Family: Not on file    Attends Rastafari Services: Not on file    Active Member of [address] [place] [address] [employer] or Organizations: Not on file    Attends Club or Organization Meetings: Not on file    Marital Status: Not on file   Intimate Partner Violence:     Fear of Current or Ex-Partner: Not on file    Emotionally Abused: Not on file    Physically Abused: Not on file    Sexually Abused: Not on file   Housing Stability:     Unable to Pay for Housing in the Last Year: Not on file    Number of [place] in the Last Year: Not on file    Unstable Housing in the Last Year: Not on file       Subjective/HPI pt continues have seizures and sedatives are being titrated. Unresponisve. No gag nor corneal reflex. Has had few NSVT overnight. Multiple communications with [facility]/ anabela rep and EP service last PM.      The squad manager was contacted. It now is apparent that the squad did not deliver Defib shocks in the field. Unclear why this was reported differently from the ER that he did receive 3 shocks. 1/17/22 Pt continues to have seizure activity. med titration per Neurology. Unresponsive with no gag reflex. 1/28/22 continued sz activity despite multiple meds. EEG report reviewed. No Arrhthymias - ventricular overnight. Pt remains V paced with underlying AF. Remains vented on 40% FIO2. Unresponsive with no gag nor corneal reflex. EKG:  Vpaced 72 with underlying AFL. Review of Systems:   Review of Systems  Unable to obtain.      Physical Examination:    BP (!) 121/52   Pulse 63   Temp 97 °F (36.1 °C) (Bladder)   Resp 22   Ht 5' 7\" (1.702 m)   Wt 177 lb 11.1 oz (80.6 kg)   SpO2 98%   BMI 27.83 kg/m²        LABS:  CBC:   Lab Results   Component Value Date    WBC 15.8 01/27/2022    RBC 2.67 01/27/2022    RBC 4.42 05/21/2012    HGB 8.5 01/27/2022    HCT 25.8 01/27/2022    MCV 96.6 01/27/2022    MCH 32.0 01/27/2022    MCHC 33.1 01/27/2022    RDW 17.4 01/27/2022     01/27/2022    MPV 10.4 02/16/2015     CBC with Differential:    Lab Results   Component Value Date    WBC 15.8 01/27/2022    RBC 2.67 01/27/2022    RBC 4.42 05/21/2012    HGB 8.5 01/27/2022    HCT 25.8 01/27/2022     01/27/2022    MCV 96.6 01/27/2022    MCH 32.0 01/27/2022    MCHC 33.1 01/27/2022    RDW 17.4 01/27/2022    LYMPHOPCT 2.5 01/25/2022    MONOPCT 3.1 01/25/2022    BASOPCT 0.3 01/25/2022    MONOSABS 0.7 01/25/2022    LYMPHSABS 0.6 01/25/2022    EOSABS 0.0 01/25/2022    BASOSABS 0.1 01/25/2022     CMP:    Lab Results   Component Value Date     01/26/2022    K 5.6 01/26/2022    CL 89 01/26/2022    CO2 23 01/26/2022    BUN 52 01/26/2022    CREATININE 7.03 01/26/2022    CREATININE 7.2 01/26/2022    GFRAA 9.3 01/26/2022    LABGLOM 7.6 01/26/2022    GLUCOSE 128 01/26/2022    GLUCOSE 76 05/21/2012    PROT 6.3 01/24/2022    LABALBU 3.7 01/24/2022    LABALBU 4.2 05/21/2012    CALCIUM 8.4 01/26/2022    BILITOT 0.4 01/24/2022    ALKPHOS 107 01/24/2022    AST 21 01/24/2022    ALT 18 01/24/2022     BMP:    Lab Results   Component Value Date     01/26/2022    K 5.6 01/26/2022    CL 89 01/26/2022    CO2 23 01/26/2022    BUN 52 01/26/2022    LABALBU 3.7 01/24/2022    LABALBU 4.2 05/21/2012    CREATININE 7.03 01/26/2022    CREATININE 7.2 01/26/2022    CALCIUM 8.4 01/26/2022    GFRAA 9.3 01/26/2022    LABGLOM 7.6 01/26/2022    GLUCOSE 128 01/26/2022    GLUCOSE 76 05/21/2012     Magnesium:    Lab Results   Component Value Date    MG 2.3 01/26/2022     Troponin:    Lab Results   Component Value Date    TROPONINI 0.315 01/26/2022        Active Hospital Problems    Diagnosis Date Noted    Cardiac arrest [facility]) [I46.9] 01/24/2022     Priority: Low        Assessment/Plan:  1. CPA - Metabolic and Respiratory related and not primarily a cardiac foci. 2. It is apparent the BiV - ICD device functioned normally and did not deliver inappropriate Treatment. 3. 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 4. COVID PNA- with respiratory failure  5. PAF/AFL- will need SQ Heparin. 6. MINAL/Hyper K- Rx during HD  7. CAD- prior CAD. 8. LE Duplex- no DVT. 9. Echo - LVEF 15%  10. Pt is critically ill. Prognosis appears poor.         Electronically signed by Arnulfo Lang MD on 1/28/2022 at 9:06 AM

## 2022-01-28 NOTE — PROGRESS NOTES
HD today for 3.5 hours via LAVF. Left arm with 3+ edema and ecchymosis. Homeostasis of LAVF achieved and dressing dry and intact. RN aware to keep left arm elevated on pillows post tx. Net UF 1 liter. All HD charting via ACES paper chart. Hands off to [address]

## 2022-01-28 NOTE — PROGRESS NOTES
Nephrology Progress Note    Assessment:  MINAL oligouria  S/P respiratory arrest home  COPD  Cardiomyopathy  DM type-2        Plan:dialysis to be done today     Patient Active Problem List:     Hypertension     COPD (chronic obstructive pulmonary disease) ([facility])     Benign prostatic hyperplasia     CAD (coronary artery disease)     Type II or unspecified type diabetes mellitus without mention of complication, not stated as uncontrolled     Cardiomyopathy ([facility] [address]     Hyperlipidemia     Neuropathy     CKD (chronic kidney disease) stage 3, GFR 30-59 ml/min ([facility])     Tobacco abuse     Diabetic neuropathy ([facility])     Cardiac arrest ([facility] [address]      Subjective:  Admit Date: 1/24/2022    Interval History: no changes    Medications:  Scheduled Meds:   lidocaine  5 mL IntraDERmal Once    sodium chloride flush  5-40 mL IntraVENous 2 times per day    lacosamide (VIMPAT) IVPB  100 mg IntraVENous BID    vancomycin (VANCOCIN) intermittent dosing (placeholder)   Other RX Placeholder    levetiracetam  500 mg IntraVENous Daily    pantoprazole  40 mg IntraVENous Daily    And    sodium chloride (PF)  10 mL IntraVENous Daily    insulin lispro  0-12 Units SubCUTAneous Q4H    sennosides-docusate sodium  2 tablet Oral BID    sodium chloride flush  5-40 mL IntraVENous 2 times per day    piperacillin-tazobactam  2,250 mg IntraVENous Q8H    dexamethasone  6 mg IntraVENous Q12H    chlorhexidine  15 mL Mouth/Throat BID     Continuous Infusions:   sodium chloride      sodium chloride      propofol 50 mcg/kg/min (01/28/22 0713)    midazolam 6 mg/hr (01/28/22 0713)    heparin (PORCINE) Infusion 5 Units/kg/hr (01/28/22 0737)    fentaNYL (SUBLIMAZE) infusion 175 mcg/hr (01/28/22 0713)    dextrose      norepinephrine (LEVOPHED) infusion 10 mcg/min (01/28/22 0713)    vasopressin (Septic Shock) infusion Stopped (01/25/22 0957)    sodium chloride         CBC:   Recent Labs     01/26/22  0520 01/27/22  0323   WBC  --  15.8*   HGB 8.5* 8.5* PLT  --  140     CMP:    Recent Labs     01/25/22 2000 01/25/22  2201 01/26/22  0157 01/26/22  0520 01/26/22  0600 01/26/22  0800   *  --  128*  --   --  128*   K 5.4*   < > 5.4*  --  5.7* 5.6*   CL 90*  --  89*  --   --  89*   CO2 25  --  23  --   --  23   BUN 46*  --  49*  --   --  52*   CREATININE 6.63*   < > 6.80* 7.2*  --  7.03*   GLUCOSE 129*  --  135*  --   --  128*   CALCIUM 8.4*  --  8.4*  --   --  8.4*   LABGLOM 8.2*   < > 7.9* 7*  --  7.6*    < > = values in this interval not displayed. Troponin:   Recent Labs     01/26/22  0600   TROPONINI 0.315*     BNP: No results for input(s): BNP in the last 72 hours. INR: No results for input(s): INR in the last 72 hours. Lipids: No results for input(s): CHOL, LDLDIRECT, TRIG, HDL, AMYLASE, LIPASE in the last 72 hours. Liver: No results for input(s): AST, ALT, ALKPHOS, PROT, LABALBU, BILITOT in the last 72 hours. Invalid input(s): BILDIR  Iron:  No results for input(s): IRONS, FERRITIN in the last 72 hours. Invalid input(s): LABIRONS  Urinalysis: No results for input(s): UA in the last 72 hours.     Objective:  Vitals: BP (!) 121/52   Pulse 63   Temp 97 °F (36.1 °C) (Bladder)   Resp 22   Ht 5' 7\" (1.702 m)   Wt 177 lb 11.1 oz (80.6 kg)   SpO2 98%   BMI 27.83 kg/m²    Wt Readings from Last 3 Encounters:   01/26/22 177 lb 11.1 oz (80.6 kg)   10/27/21 160 lb (72.6 kg)   06/16/16 176 lb (79.8 kg)      24HR INTAKE/OUTPUT:      Intake/Output Summary (Last 24 hours) at 1/28/2022 0936  Last data filed at 1/28/2022 7214  Gross per 24 hour   Intake 1757.9 ml   Output 20 ml   Net 1737.9 ml       General:notlert, in no apparent distress  HEENT: normocephalic, atraumatic, anicteric   Et in placeNeck: supple, no mass  Lungs: non-labored respirations, clear to auscultation bilaterally  Heart: regular rate and rhythm, no murmurs or rubs  Abdomen: soft, non-tender, non-distended  Ext: no cyanosis, no peripheral edema  Neuro: alert and oriented, no gross abnormalities  Psych: normal mood and affect cannot detect  Skin: no rash      Electronically signed by Gonzalo Hughes DO, MD

## 2022-01-28 NOTE — PROGRESS NOTES
Comprehensive Nutrition Assessment    Type and Reason for Visit:  Reassess    Nutrition Recommendations/Plan:   Change TF to Renal TF ( Nepro) due to hyperkalemia. Increase to goal rate of 25 ml/hr and add 1 protein modulars per day    Nutrition Assessment:  Pt remains critically ill, intubated, s/p cardiac arrest, unresponsive on vent, likely anoxic brain injury per notes, on dialysis, neglibible urine output    Malnutrition Assessment:  Malnutrition Status:  Insufficient data    Context:  Chronic Illness     Findings of the 6 clinical characteristics of malnutrition:  Energy Intake:  Unable to assess  Weight Loss:  Unable to assess     Body Fat Loss:  Unable to assess     Muscle Mass Loss:  Unable to assess    Fluid Accumulation:     mild   Strength:  Not Performed    Estimated Daily Nutrient Needs:  Energy (kcal):  7100-5556 (kg x 20-22); Weight Used for Energy Requirements:  Admission (72.6 kg)     Protein (g):   gm (kg IBW x 1.2-1.5); Weight Used for Protein Requirements:  Ideal (67.2 kg)        Fluid (ml/day):   As per MD; Method Used for Fluid Requirements:  Standard Renal      Nutrition Related Findings:  intubated 1/24, TF started 1/26 after artic sun, tolerating well, BM 1/24 ( on bowel regimen), propofol Delivers ~ 640 kcals, , levophed @ 9.6 ml/hr, 2-3+ BUE edema per nursing, elevated renal labs noted, glucose controlled      Wounds:  None       Current Nutrition Therapies:    Current Tube Feeding (TF) Orders:  · Feeding Route: Orogastric  · Formula: Peptide Based  · Schedule: Continuous @ 20   · Additives/Modulars: Protein (( 1/28 x 1= 104 kcals, 26 g protein))  · Water Flushes: 50 ml every 6 hrs (200 ml)  · Current TF & Flush Orders Provides: 576 kcal, 36 gm protein, ~ 590 ml free water  · Goal TF & Flush Orders Provides: with change to Nepro + 1 protein modular = 1184 kcals ( + propfofol)75 g protein, ~690 ml free water    Anthropometric Measures:  · Height: 5' 7\" (170.2 cm)  · Current Body Weight: 177 lb (80.3 kg) (1/26 * edema present)   · Admission Body Weight: 160 lb (72.6 kg)    · Usual Body Weight: 160 lb (72.6 kg) (stated)     · Ideal Body Weight: 148 lbs;   · BMI: 27.7   · BMI Categories: Overweight (BMI 25.0-29. 9)       Nutrition Diagnosis:   · Inadequate oral intake related to impaired respiratory function as evidenced by intubation    · Altered nutrition-related lab values related to renal dysfunction as evidenced by dialysis,lab values      Nutrition Interventions:   Food and/or Nutrient Delivery:  Modify Tube Feeding (Change TF to Renal TF ( Nepro) due to hyperkalemia. Increase to goal rate of 25 ml/hr and add 1 protein modulars per day)  Nutrition Education/Counseling:  Education not indicated   Coordination of Nutrition Care:  Continue to monitor while inpatient    Goals:  new goals : EN to deliver kcals/protein within range of estimated needs, stable/improved renal labs/resolve consipation       Nutrition Monitoring and Evaluation:   Behavioral-Environmental Outcomes:   Other (Comment)   Food/Nutrient Intake Outcomes:  Enteral Nutrition Intake/Tolerance  Physical Signs/Symptoms Outcomes:  Biochemical Data,Fluid Status or Edema,Hemodynamic Status,Weight     Discharge Planning:      too soon to determine    Electronically signed by Silva Thibodeaux RD, LD on 1/28/22 at 1:40 PM EST

## 2022-01-28 NOTE — PROGRESS NOTES
Patient ID:  Christina Rodriguez  [id number]  68 y.o.  1945    Assumed Care of Patient. Disaster Documentation initiated as per policy / SOP. Report Received from Vinnie Quinn. Assessment Complete, please see flow sheets. Labs, orders, plan of care and meds reviewed. 2040  Ishan from [facility] given updates. Labs:   Recent Labs     01/25/22  0648 01/25/22  0820 01/25/22 2201 01/26/22  0520 01/27/22  0323   WBC 23.7*  --   --   --  15.8*   HGB 9.3*   < > 9.4* 8.5* 8.5*   HCT 29.4*  --   --   --  25.8*     --   --   --  140    < > = values in this interval not displayed. Recent Labs     01/25/22 2000 01/25/22 2201 01/26/22  0157 01/26/22  0520 01/26/22  0600 01/26/22  0800   *  --  128*  --   --  128*   K 5.4*   < > 5.4*  --  5.7* 5.6*   CL 90*  --  89*  --   --  89*   CO2 25  --  23  --   --  23   BUN 46*  --  49*  --   --  52*   CREATININE 6.63*   < > 6.80* 7.2*  --  7.03*   CALCIUM 8.4*  --  8.4*  --   --  8.4*   PHOS 4.6  --  5.0*  --   --  5.5*    < > = values in this interval not displayed. No results for input(s): AST, ALT, BILIDIR, BILITOT, ALKPHOS in the last 72 hours.             Electronically signed by Mira Del Rio RN

## 2022-01-28 NOTE — PROGRESS NOTES
PHARMACY NOTE:   ICU Rounds Attended (10-15 minutes in patient room):    Pt diagnosis: COVID/cardiac arrest    Changes made by pharmacy:   Recommend repeat procal and evaluation of broad spectrum antibiotics  Follow up length of therapy for decadron    Notes:   Antimicrobial: Day #4 vanco and zosyn. ID NOT on consult. Blood cultures no growth  Anticoagulation: heparin drip  Steroid: decadron 6mg IV q12h x 5D  Sedation: Versed, fentanyl, propofol  Drips: keppra, vimpat  Medium SSI: 2 units in last 24 hours  Pressers: levo, vaso  Core measures met/assessed.      LICHA Almazan [facility] PharmD

## 2022-01-28 NOTE — PROCEDURES
[address]                      [address], [address] [facility]                          ELECTROENCEPHALOGRAM REPORT    PATIENT NAME: Jackie Alvarado                     :        1945  MED REC NO:   [id number]                            ROOM:       [facility]  ACCOUNT NO:   [de-identified]                           ADMIT DATE: 2022  PROVIDER:     Rajeev Hanna MD    DATE OF EE2022    INDICATION FOR EEG:  Seizures. EEG BACKGROUND:  This recording consists of generalized myoclonic  artifact occurring at least once per second up to 3 Hz. There is no  discernible background otherwise. HYPERVENTILATION:  Not done. PHOTIC STIMULATION:  No effect. REACTIVITY PROTOCOL:  No effect to painful stimulation, clap or calling  name. SEIZURES AND SPIKES:  The entire recording consisted of myoclonic jerks. These have a clinical correlate of brief neck extension, protrusion of  the tongue and sustained upgaze lasting 1-2 seconds. EK beats per minute sinus rhythm. CLINICAL CORRELATION:  This recording represents myoclonic status  epilepticus. The lack of background and lack of reactivity can suggest  a lack of return to wakefulness. This must be interpreted in context of  remainder of neurologic exam, imaging, and other results.         Kenny Mckeon MD    D: 2022 12:59:15       T: 2022 13:34:36     [email or web address]  Job#: [id number]     Doc#: [id number]    CC:

## 2022-01-28 NOTE — PROGRESS NOTES
Critical Care Progress Note    2022 5:19 PM    Subjective:   Admit Date: 2022  PCP: Larry Garcia MD    Chief Complaint   Patient presents with    Cardiac Arrest     Interval History: Has been back on sedation due to persistent seizure activities. Had EEG yesterday. On low ventilator settings.          Medications:   Scheduled Meds:   vancomycin  1,250 mg IntraVENous Once    norepinephrine        lidocaine  5 mL IntraDERmal Once    sodium chloride flush  5-40 mL IntraVENous 2 times per day    lacosamide (VIMPAT) IVPB  100 mg IntraVENous BID    vancomycin (VANCOCIN) intermittent dosing (placeholder)   Other RX Placeholder    levetiracetam  500 mg IntraVENous Daily    pantoprazole  40 mg IntraVENous Daily    And    sodium chloride (PF)  10 mL IntraVENous Daily    insulin lispro  0-12 Units SubCUTAneous Q4H    sennosides-docusate sodium  2 tablet Oral BID    sodium chloride flush  5-40 mL IntraVENous 2 times per day    piperacillin-tazobactam  2,250 mg IntraVENous Q8H    dexamethasone  6 mg IntraVENous Q12H    chlorhexidine  15 mL Mouth/Throat BID     Continuous Infusions:   sodium chloride      sodium chloride      propofol 50 mcg/kg/min (22 1302)    midazolam 6 mg/hr (22 0713)    heparin (PORCINE) Infusion 5 Units/kg/hr (22 0737)    fentaNYL (SUBLIMAZE) infusion 175 mcg/hr (22 1004)    dextrose      norepinephrine (LEVOPHED) infusion 10 mcg/min (22 0713)    vasopressin (Septic Shock) infusion Stopped (22 0957)    sodium chloride           Objective:   Vitals:   Temp (24hrs), Av.7 °F (36.5 °C), Min:96.3 °F (35.7 °C), Max:99.9 °F (37.7 °C)    BP (!) 121/52   Pulse 68   Temp 97 °F (36.1 °C) (Bladder)   Resp (!) 0   Ht 5' 7\" (1.702 m)   Wt 177 lb 11.1 oz (80.6 kg)   SpO2 99%   BMI 27.83 kg/m²   I/O:24HR INTAKE/OUTPUT:      Intake/Output Summary (Last 24 hours) at 2022 1719  Last data filed at 2022 0713  Gross per 24 hour Intake 1757.9 ml   Output 20 ml   Net 1737.9 ml     01/27 0701 - 01/28 0700  In: 70 [I.V.:70]  Out: 20 [Urine:20]  CVP:             Ventilator Settings:  Vent Mode: AC/VC  Rate Set: 22 bmp   Vt Ordered: 400 mL       PEEP/CPAP: 5   FiO2 : 40 %     Physical Exam:   General appearance -ill-appearing  Mental status-  intubated and sedated  Eyes - pupils equal and reactive, extraocular eye movements intact  Nose - normal and patent  Mouth-ET tube in place. Neck - supple, no significant adenopathy  Chest - clear to auscultationsymmetric air entry  Heart -, regular rhythm, normal rate, S1, S2, no murmurs, rubs, clicks or gallops  Abdomen - soft, nontender, nondistended, no masses or organomegaly  Rectal - deferred, not clinically indicated  Neurological -intubated and sedated. Musculoskeletal - no joint tenderness, deformity or swelling  Extremities - peripheral pulses normal, no pedal edema, no clubbing or cyanosis  Skin - normal coloration and turgor, no rashes                BMP:    Recent Labs     01/25/22 2000 01/25/22  2201 01/26/22  0157 01/26/22  0157 01/26/22  0520 01/26/22  0600 01/26/22  0800   *   < > 128*  --   --   --  128*   K 5.4*   < > 5.4*   < >  --  5.7* 5.6*   CL 90*   < > 89*  --   --   --  89*   CO2 25  --  23  --   --   --  23   BUN 46*   < > 49*  --   --   --  52*   CREATININE 6.63*   < > 6.80*  --  7.2*  --  7.03*   GLUCOSE 129*   < > 135*  --   --   --  128*    < > = values in this interval not displayed. .  MG:3,PHOS:3)@  Ionized Calcium: No results found for: IONCA  CBC:   Recent Labs     01/26/22  0520 01/27/22  0323   WBC  --  15.8*   HGB 8.5* 8.5*   PLT  --  140      ABG: No results for input(s): PH, PCO2, PO2 in the last 72 hours. Assessment and Plan:       Impression:     -CP arrest, ? Etiology, cardiac vs respiratory. Suspect significant downtime with hypoxic brain injury  -Ventricular tachycardia. Per EMS, had less than 30 seconds of V. tach. Did not need shock. -Acute respiratory failure requiring mechanical ventilation. Currently on low vent settings  - persistent seizure activities , myoclonus . This has improved with sedation and antiepileptic. -COVID-19 pneumonia.  -Circulatory shock. Currently on  pressors.  -Lactic acidosis due to hypoperfusion.     -Hyperkalemia and mild hyponatremia. -ESRD  -Anemia, chronic.     Recommendations:     -Continue current care in the ICU for hemodynamic and airway monitoring.  -Continue ventilator bundle. Is currently on 50% FiO2.  -For COVID-19 pneumonia, continue steroids.    -Continue anticoagulation. He has been on Eliquis before this. Less likely VTE.  -Continue vasopressors. Goal map above 65.  - Dialysis per nephrology.  -Interrogation of his defibrillator.  -Poor prognosis. neurology consulted. EEG reviewed.           Full Code      Excluding procedures, the total critical care time caring for this patient with life threatening, unstable organ failure, including direct patient contact, review of medical record, management of life support systems, review of data including imaging and labs, discussions with other team members, patient's family and physicians at least 31 minutes so far today.         Electronically signed by Tiffanie Fitzpatrick MD on 1/28/2022 at 5:19 PM

## 2022-01-28 NOTE — PROGRESS NOTES
Palliative Care Progress Note  Patient: Ck Berg  Gender: male  YOB: 1945  Unit/Bed: IC16/IC16-01  Code Status: Full Code  Inpatient Treatment Team: Treatment Team: Attending Provider: Miguelito Szymanski MD; Consulting Physician: Trinity Be MD; Consulting Physician: Madelaine Barthel, MD; Consulting Physician: Rock Mcdaniel MD; Consulting Physician: Mauricio Herron MD; Consulting Physician: Champ Alegre MD; Utilization Reviewer: Ariane Rodriguez RN; Consulting Physician: Melodie Lorenz MD; Registered Nurse: Lesa Toribio, RN; : Zena Landa RN  Admit Date:  1/24/2022    Chief Complaint: Goals of Care    History of Presenting Illness:      Ck Berg is a 68 y.o. male on hospital day 4 with a history of hypertension, COPD, history of tobacco abuse history of end-stage renal disease on daily hemodialysis, patient continues to smoke, history of chronic systolic heart failure status post AICD brought to the ER with cardiac arrest.ROSC was achieved, started having seizures. found to be covid positive. IN ICU, vented, sedated continues seizer activity every 33 seconds despite maximum medical therapy, per neurology:  Examined on propofol and fentanyl. Pupils were 3 mm and reactive to light. No clear corneal response in clinic to sedation. Every 3 seconds the patient had clear myoclonic jerking of the neck eyelids and face lasting about  1 to 2 seconds       EEG and MRI ordered, echo shows EF of 15%. Rewarming has started. 01/28/22        Off sedation, unresponsive, EEG shows no discernible background other than seizures.   There was no response to use stimulation or reactivity during the EEG which is associated with a lack of progression to wakefulness post arrest.                Review of Systems:       Review of Systems   Unable to perform ROS: Intubated       Physical Examination:       BP (!) 121/52   Pulse 63   Temp 97 °F (36.1 °C) (Bladder)   Resp 22   Ht 5' 7\" (1.702 m)   Wt 177 lb 11.1 oz (80.6 kg)   SpO2 98%   BMI 27.83 kg/m²    Physical Exam  Constitutional:       Appearance: He is ill-appearing. Interventions: He is sedated and intubated. HENT:      Head: Normocephalic and atraumatic. Nose: Nose normal.   Eyes:      General: Lids are normal.   Cardiovascular:      Rate and Rhythm: Normal rate and regular rhythm. Pulmonary:      Effort: Pulmonary effort is normal. He is intubated. Skin:     Capillary Refill: Capillary refill takes less than 2 seconds. Coloration: Skin is pale. Neurological:      Mental Status: He is unresponsive. GCS: GCS eye subscore is 1. GCS verbal subscore is 1. GCS motor subscore is 1. Psychiatric:         Speech: He is noncommunicative.          Allergies:      No Known Allergies    Medications:      Current Facility-Administered Medications   Medication Dose Route Frequency Provider Last Rate Last Admin    lidocaine 2 % injection 5 mL  5 mL IntraDERmal Once Gordillo Schiller, APRN - CNP        sodium chloride flush 0.9 % injection 5-40 mL  5-40 mL IntraVENous 2 times per day Gordillo Schiller, APRN - CNP   10 mL at 01/27/22 2008    sodium chloride flush 0.9 % injection 5-40 mL  5-40 mL IntraVENous PRN Gordillo Schiller, APRN - CNP        0.9 % sodium chloride infusion  25 mL IntraVENous PRN Gordillo Schiller, APRN - CNP        0.9 % sodium chloride infusion  250 mL IntraVENous Once Gordillo Schiller, APRN - CNP        propofol injection  5-50 mcg/kg/min IntraVENous Titrated Eliot Worley MD 24.4 mL/hr at 01/28/22 0713 50 mcg/kg/min at 01/28/22 0713    lacosamide (VIMPAT) 100 mg in dextrose 5 % 60 mL IVPB  100 mg IntraVENous BID Fernando Wilburn MD   Stopped at 01/27/22 2242    midazolam (VERSED) 100 mg in dextrose 5 % 100 mL infusion  1-10 mg/hr IntraVENous Continuous Gordillo Schiller, APRN - CNP 6 mL/hr at 01/28/22 0713 6 mg/hr at 01/28/22 0713    heparin (porcine) 25,000 Units in dextrose 5 % 250 mL infusion  5-30 Units/kg/hr IntraVENous Continuous Kailyn Partida MD 4 mL/hr at 01/28/22 0737 5 Units/kg/hr at 01/28/22 0737    vancomycin (VANCOCIN) intermittent dosing (placeholder)   Other RX Placeholder Ferny Enriquez MD        fentaNYL (SUBLIMAZE) 1,000 mcg in sodium chloride 0.9 % 100 mL infusion  12.5-200 mcg/hr IntraVENous Continuous Ferny Enriquez MD 17.5 mL/hr at 01/28/22 0713 175 mcg/hr at 01/28/22 0713    heparin (porcine) injection 4,000 Units  4,000 Units IntraVENous PRN Ferny Enriquez MD        heparin (porcine) injection 2,000 Units  2,000 Units IntraVENous PRN Ferny Enriquez MD   2,000 Units at 01/28/22 0736    levETIRAcetam (KEPPRA) 500 mg in sodium chloride 0.9 % 100 mL IVPB  500 mg IntraVENous Daily Tigist Osorio MD   Stopped at 01/27/22 0915    LORazepam (ATIVAN) injection 2 mg  2 mg IntraVENous Q6H PRN Tigist Osorio MD   2 mg at 01/26/22 0841    pantoprazole (PROTONIX) injection 40 mg  40 mg IntraVENous Daily Morrie Gull, APRN - CNP   40 mg at 01/27/22 0914    And    sodium chloride (PF) 0.9 % injection 10 mL  10 mL IntraVENous Daily Morrie Gull, APRN - CNP   10 mL at 01/26/22 0916    insulin lispro (HUMALOG) injection vial 0-12 Units  0-12 Units SubCUTAneous Q4H Morrie Gull, APRN - CNP   2 Units at 01/28/22 0415    sennosides-docusate sodium (SENOKOT-S) 8.6-50 MG tablet 2 tablet  2 tablet Oral BID Morrie Gull, APRN - CNP   2 tablet at 01/26/22 0809    glucose (GLUTOSE) 40 % oral gel 15 g  15 g Oral PRN Morrie Gull, APRN - CNP        dextrose 50 % IV solution  12.5 g IntraVENous PRN Morrie Gull, APRN - CNP        glucagon (rDNA) injection 1 mg  1 mg IntraMUSCular PRN Morrie Gull, APRN - CNP        dextrose 5 % solution  100 mL/hr IntraVENous PRN Morrie Gull, APRN - CNP        norepinephrine (LEVOPHED) 16 mg in dextrose 5 % 250 mL infusion  2-100 mcg/min IntraVENous Continuous Ferny Enriquez MD 9.4 mL/hr at 01/28/22 0713 10 mcg/min at 01/28/22 0713    vasopressin 20 Units in dextrose 5 % 100 mL infusion  0.01-0.04 Units/min IntraVENous Continuous Konrad Larry MD   Stopped at 01/25/22 0957    sodium chloride flush 0.9 % injection 5-40 mL  5-40 mL IntraVENous 2 times per day Konrad Larry MD   10 mL at 01/27/22 2009    sodium chloride flush 0.9 % injection 5-40 mL  5-40 mL IntraVENous PRN Konrad Larry MD        0.9 % sodium chloride infusion  25 mL IntraVENous PRN Konrad Larry MD        ondansetron (ZOFRAN-ODT) disintegrating tablet 4 mg  4 mg Oral Q8H PRN Konrad Larry MD        Or    [facility] injection 4 mg  4 mg IntraVENous Q6H PRN Konrad Larry MD        polyethylene glycol (GLYCOLAX) packet 17 g  17 g Oral Daily PRN Konrad Larry MD        acetaminophen (TYLENOL) tablet 650 mg  650 mg Oral Q6H PRN Konrad Larry MD        Or    acetaminophen (TYLENOL) suppository 650 mg  650 mg Rectal Q6H PRN Konrad Larry MD        piperacillin-tazobactam (ZOSYN) 2,250 mg in dextrose 5 % 50 mL IVPB (mini-bag)  2,250 mg IntraVENous Q8H Konrad Larry MD   Stopped at 01/28/22 0354    dexamethasone (DECADRON) injection 6 mg  6 mg IntraVENous Q12H Konrad Larry MD   6 mg at 01/27/22 2200    chlorhexidine (PERIDEX) 0.12 % solution 15 mL  15 mL Mouth/Throat BID Konrad Larry MD   15 mL at 01/27/22 2038       History: PMHx:  Past Medical History:   Diagnosis Date    BPH (benign prostatic hypertrophy)     CAD (coronary artery disease)     sees [facility]. Nneka Wagner in [facility])     CHF (congestive heart failure) ([facility] [address]     LVEF 40% on echo 6/2014    COPD (chronic obstructive pulmonary disease) ([facility] [address]     has O2 rarely uses, has nebulizer    Hypertension     On home oxygen therapy     Type II or unspecified type diabetes mellitus without mention of complication, not stated as uncontrolled        PSHx:  No past surgical history on file.     Social Hx:  Social History     Socioeconomic History    Marital status:      Spouse name: Not on file    Number of Continence  Dependent   : [ x ] Eating, [ x  ] Dressing, [  x ] Transferring, [ x  ] Toileting, [x   ] Bathing, [ x  ] Continence  W. assistant : [  ] Eating, [   ] Dressing, [   ] Transferring, [   ] [employer], [   ] Bathing, [   ] Continence    Radiology: Reviewed      Echocardiogram complete 2D with doppler with color    Result Date: 1/25/2022  Transthoracic Echocardiography Report (TTE)  Demographics   Patient Name    Ernesto Romeo Gender               Male   Patient Number  [id number]       Race                                                   Ethnicity   Visit Number    [id number]      Room Number          IC16   Corporate ID                   Date of Study        01/25/2022   Accession       [id number]     Referring Physician  Mike Lindquist MD  Number   Date of Birth   1945     Sonographer          Alan Thackerville   Age             68 year(s)     Interpreting         [facility]                                 Physician            Cardiology                                                      Holiday Greg ASHFORD DO  Procedure Type of Study   TTE procedure:ECHO COMPLETE 2D W/DOP W/COLOR. Procedure Date: 01/25/2022 Start: 03:59 PM Study Location: Portable Technical Quality: Adequate visualization Indications:Cardiac arrest. Patient Status: Routine Height: 67 inches Weight: 160 pounds BSA: 1.84 m^2 BMI: 25.06 kg/m^2 BP: 160/90 mmHg  Conclusions   Summary  Left ventricular ejection fraction is visually estimated at 15%. global  hypokinesis. Left ventricular size is severely increased . Right ventricle global systolic function is mildly reduced . The left atrium is Mildly dilated. Trace (+) mitral regurgitation is present. No evidence of aortic valve regurgitation . No evidence of aortic valve stenosis.    Signature   ----------------------------------------------------------------  Electronically signed by Gabby Barragan DO(Interpreting  physician) on 01/25/2022 05:52 PM ----------------------------------------------------------------   Findings  Left Ventricle Left ventricular ejection fraction is visually estimated at 15%. Left ventricular size is severely increased . Right Ventricle Normal size right ventricle cavity. Right ventricle global systolic function is mildly reduced . Left Atrium The left atrium is Mildly dilated. Right Atrium Normal right atrium. Mitral Valve Diffusely thickened and pliable mitral valve leaflets with normal excursion. Trace (+) mitral regurgitation is present. No evidence of mitral valve stenosis. Tricuspid Valve Normal tricuspid valve structure and function. Aortic Valve No evidence of aortic valve regurgitation . No evidence of aortic valve stenosis. Pulmonic Valve Normal pulmonic valve structure and function. Pericardial Effusion No evidence of pericardial effusion. Pleural Effusion No evidence of pleural effusion. Aorta \ Miscellaneous normal findings were found. M-Mode Measurements (cm)   LVIDd: 4.91 cm                                    LVIDs: 4.63 cm  IVSd: 1.58 cm                                     IVSs: 1.38 cm  LVPWd: 1.21 cm                                    LVPWs: 1.16 cm  Rt. Vent.  Dimension: 2.55 cm  Doppler Measurements:    MV Peak E-Wave: 0.35 m/s   MV Peak A-Wave: 1.08 m/s  Valves  Mitral Valve   Peak E-Wave: 0.35 m/s                 Peak A-Wave: 1.08 m/s                                        E/A Ratio: 0.33                                        Peak Gradient: 0.5 mmHg                                        Deceleration Time: 178.4 msec   Tissue Doppler   E' Septal Velocity: 0.03 m/s  E' Lateral Velocity: 0.03 m/s  Structures  Left Ventricle   Diastolic Dimension: 0.00 cm          Systolic Dimension: 1.45 cm  Septum Diastolic: 0.35 cm             Septum Systolic: 0.83 cm  PW Diastolic: 5.89 cm                 PW Systolic: 0.30 cm                                        FS: 5.7 %  LV EDV/LV EDV Index: 113.44 ml/62 m^2 LV ESV/LV ESV Index: 99.02 ml/54 m^2  EF Calculated: 12.7 %                 LV Length: 9.19 cm   Right Ventricle   Diastolic Dimension: 8.10 cm      CT Head WO Contrast    Result Date: 1/25/2022  EXAMINATION: CT of the brain without contrast HISTORY: Cardiac arrest. Fall. COMPARISON: None available TECHNIQUE: Multiple contiguous axial images were obtained of the brain from the skull base through the vertex. Multiplanar reformats were obtained. FINDINGS: Prominence of the sulci and ventricles compatible with mild generalized parenchymal volume loss. Areas of bilateral supratentorial white matter hypoattenuation are nonspecific but most likely related to chronic small vessel ischemic changes in a patient of this age. Gray-white matter differentiation is preserved. No acute hemorrhage or abnormal extra-axial fluid collection. No mass effect or midline shift. The visualized paranasal sinuses and mastoid air cells are clear. Calvarium is intact. Endotracheal tube and orogastric tube partially visualized. No acute intracranial process. Generalized parenchymal volume loss and nonspecific white matter findings most compatible with chronic small vessel ischemic changes in a patient of this age. All CT scans at this facility use dose modulation, iterative reconstruction, and/or weight based dosing when appropriate to reduce radiation dose to as low as reasonably achievable. CT CERVICAL SPINE WO CONTRAST    Result Date: 1/25/2022  EXAM: CT CERVICAL SPINE WO CONTRAST COMPARISON: None available HISTORY:  Cardiac arrest. Fall. TECHNIQUE: Multiple contiguous axial CT images of the cervical spine were obtained. Multiplanar reformats performed. FINDINGS:  Endotracheal tube is present. A radiopaque foreign body is present along the left lateral aspect of the endotracheal tube at the S2 level. Orogastric tube is present and partially visualized. No acute fracture or malalignment. Atlanto-occipital articulation is maintained.   Atlantodental interval is preserved. Cervical spinal vertebral body heights are preserved. Degenerative disc disease at C5-6 and C6-7. Mild multilevel facet arthropathy. Suspect at least mild spinal canal stenosis at C5-6 and C6-7. Varying degrees of osseous neuroforaminal stenosis throughout the cervical spine. There is no prevertebral soft tissue swelling. Areas of septal thickening and small ground glass opacities of both lung apices are nonspecific. Atherosclerotic calcification at the carotid bulbs. No acute fracture or malalignment. 2 cm foreign body along the left aspect of the endotracheal tube at the C2 level. Septal thickening and small groundglass opacities of the lung apices may represent pulmonary edema or may be infectious/inflammatory in etiology. All CT scans at this facility use dose modulation, iterative reconstruction, and/or weight based dosing when appropriate to reduce radiation dose to as low as reasonably achievable. XR CHEST PORTABLE    Result Date: 1/25/2022  Exam: XR CHEST PORTABLE History:  intubation Technique: AP portable view of the chest obtained. Comparison: none Chest x-ray portable Findings: The patient is intubated  with the tip of the endotracheal tube at the thoracic inlet. There is a  AICD device in place The cardiomediastinal silhouette is within normal limits. There is no pneumothorax. There are mild increased markings throughout both lungs. There is minimal blunting of the left costophrenic recess consistent with a small left pleural effusion. . Bones of the thorax appear intact. There are small left pleural effusion. Status post intubation. There are no consolidations or infiltrates. XR CHEST PORTABLE    Result Date: 1/25/2022  Exam: XR CHEST PORTABLE History:  central line Technique: AP portable view of the chest obtained.  Comparison: none Chest x-ray portable Findings: The patient is intubated, there is an NG tube coursing towards the stomach    There is a   central line with the tip projecting in the region of the superior vena cava. There is a  AICD device in place The cardiomediastinal silhouette is within normal limits. There are mild increased markings throughout both lungs. There are no large effusions or consolidations. Bones of the thorax appear intact. There are mild increased pulmonary markings in both lungs which may be bilateral infiltrates, COVID-19 pneumonia. US DUP LOWER EXTREMITIES BILATERAL VENOUS    Result Date: 1/25/2022  Venous duplex ultrasound of the right and left lower extremity HISTORY:  DVT COMPARISON:  None available TECHNIQUE: [place] Killer scale, duplex Doppler, with compression and augmentation sonography of the deep venous system of the right and left  lower extremity was performed by a registered sonographer and the images were submitted for interpretation. FINDINGS:  The common femoral, femoral, and popliteal veins demonstrate normal color flow, augmentation, and compressibility. No venous duplex ultrasound evidence of DVT in the right or left lower extremity. No sonographic evidence of deep venous thrombosis within the  right or left  lower extremity. Assessment and plan:      -Advance Care Planning  Discussed goals of care with both his daughter ad his wife, Explained in extensive detail nuances between full code, DNR CCA and DNR CC. Remains Full Code      -Goals of Care Discussion:   Long discussion with multiple family members. Disease process and goals of treatment were discussed in basic terms. Family  goal is to optimize available comfort to allow him time to recover. Though his daughter knows her father would not want to be kept alive if no meaningful brain function can be recovered. We discussed that anoxic brain injury is multifactorial, he had downtime of 8 minutes, advanced COPD, and seizures. Discussed current EF of 15% and how that could  Impact his health in the future.   Made aware his prognosis is poor.  Family would like results of EEG  Prior to changing code status and treatment plan. We discussed the palliative care philosophy in light of those goals. We discussed all care options contingent on treatment response and QOL. Much active listening, presence, and emotional support were given. 1/28/22    Spoke with his daughter Padmini Dominguez, whom Mrs. Perla Walter asked me to direct my communications too. We discussed EEG results, per neurology notes:    EEG showed no discernible background other than seizures. There was no response to use stimulation or reactivity during the EEG which is associated with a lack of progression to wakefulness post arrest.  EEG was done off sedation.     Dr. Hanna indicated in her notes she was available to discuss test results with family. Padmini Dominguez feels this will be helpful. Eligible for hospice due to anoxic brain injury, CHF and post full arrest with EF of 15, advanced COPD, inability to maintain his own airway, hydration, or nutrition,decline in functional status,   ( PPS 10).         While hospitalized he is being treated for:  Seizures  Vfib  ESRD  COPD  PAF  CAD  COVID 19  Cardiac arrest      Electronically signed by ESTHER Fleming CNP on 1/28/2022 at 8:50 AM

## 2022-01-28 NOTE — PROGRESS NOTES
Progress Note  Date:2022       Room:[place] [address]  Patient [place]     YOB: 1945     Age:76 y.o. Subjective    Subjective     Review of Systems  ROS: could not be obtained bc pt is intubated  Objective         Vitals Last 24 Hours:  TEMPERATURE:  Temp  Av.7 °F (36.5 °C)  Min: 96.3 °F (35.7 °C)  Max: 99.9 °F (37.7 °C)  RESPIRATIONS RANGE: Resp  Av.8  Min: 20  Max: 22  PULSE OXIMETRY RANGE: SpO2  Av.8 %  Min: 98 %  Max: 100 %  PULSE RANGE: Pulse  Av.4  Min: 60  Max: 73  BLOOD PRESSURE RANGE: No data recorded.  ; No data recorded. I/O (24Hr): Intake/Output Summary (Last 24 hours) at 2022 1146  Last data filed at 2022 0713  Gross per 24 hour   Intake 1757.9 ml   Output 20 ml   Net 1737.9 ml     Objective:  General Appearance:  Ill-appearing. Vital signs: (most recent): Blood pressure (!) 121/52, pulse 60, temperature 97 °F (36.1 °C), temperature source Bladder, resp. rate 22, height 5' 7\" (1.702 m), weight 177 lb 11.1 oz (80.6 kg), SpO2 98 %. HEENT: (+ ET tube, neck is supple)    Heart: S1 normal and S2 normal.    Abdomen: Abdomen is soft. Bowel sounds are normal.   There is no epigastric area or suprapubic area tenderness. Pulses: Distal pulses are intact. Neurological: (Sedated). Skin:  Warm and dry.       Labs/Imaging/Diagnostics    Labs:  CBC:  Recent Labs     22  0520 22  0323   WBC  --   --  15.8*   RBC  --   --  2.67*   HGB 9.4* 8.5* 8.5*   HCT  --   --  25.8*   MCV  --   --  96.6   RDW  --   --  17.4*   PLT  --   --  140     CHEMISTRIES:  Recent Labs     22  2000 22  0157 22  0520 22  0600 22  0800   *  --  128*  --   --  128*   K 5.4*   < > 5.4*  --  5.7* 5.6*   CL 90*  --  89*  --   --  89*   CO2 25  --  23  --   --  23   BUN 46*  --  49*  --   --  52*   CREATININE 6.63*   < > 6.80* 7.2*  --  7.03*   GLUCOSE 129*  --  135*  --   --  128*   PHOS 4.6  --  5.0*  --   --  5.5*   MG 2.2  --  2.3  --   --  2.3    < > = values in this interval not displayed. PT/INR:  No results for input(s): PROTIME, INR in the last 72 hours. APTT:  No results for input(s): APTT in the last 72 hours. LIVER PROFILE:  No results for input(s): AST, ALT, BILIDIR, BILITOT, ALKPHOS in the last 72 hours. Imaging Last 24 Hours:  CT Head WO Contrast    Result Date: 1/25/2022  EXAMINATION: CT of the brain without contrast HISTORY: Cardiac arrest. Fall. COMPARISON: None available TECHNIQUE: Multiple contiguous axial images were obtained of the brain from the skull base through the vertex. Multiplanar reformats were obtained. FINDINGS: Prominence of the sulci and ventricles compatible with mild generalized parenchymal volume loss. Areas of bilateral supratentorial white matter hypoattenuation are nonspecific but most likely related to chronic small vessel ischemic changes in a patient of this age. Gray-white matter differentiation is preserved. No acute hemorrhage or abnormal extra-axial fluid collection. No mass effect or midline shift. The visualized paranasal sinuses and mastoid air cells are clear. Calvarium is intact. Endotracheal tube and orogastric tube partially visualized. No acute intracranial process. Generalized parenchymal volume loss and nonspecific white matter findings most compatible with chronic small vessel ischemic changes in a patient of this age. All CT scans at this facility use dose modulation, iterative reconstruction, and/or weight based dosing when appropriate to reduce radiation dose to as low as reasonably achievable. CT CERVICAL SPINE WO CONTRAST    Result Date: 1/25/2022  EXAM: CT CERVICAL SPINE WO CONTRAST COMPARISON: None available HISTORY:  Cardiac arrest. Fall. TECHNIQUE: Multiple contiguous axial CT images of the cervical spine were obtained. Multiplanar reformats performed. FINDINGS:  Endotracheal tube is present.  A radiopaque foreign body is present along the left lateral aspect of the endotracheal tube at the S2 level. Orogastric tube is present and partially visualized. No acute fracture or malalignment. Atlanto-occipital articulation is maintained. Atlantodental interval is preserved. Cervical spinal vertebral body heights are preserved. Degenerative disc disease at C5-6 and C6-7. Mild multilevel facet arthropathy. Suspect at least mild spinal canal stenosis at C5-6 and C6-7. Varying degrees of osseous neuroforaminal stenosis throughout the cervical spine. There is no prevertebral soft tissue swelling. Areas of septal thickening and small ground glass opacities of both lung apices are nonspecific. Atherosclerotic calcification at the carotid bulbs. No acute fracture or malalignment. 2 cm foreign body along the left aspect of the endotracheal tube at the C2 level. Septal thickening and small groundglass opacities of the lung apices may represent pulmonary edema or may be infectious/inflammatory in etiology. All CT scans at this facility use dose modulation, iterative reconstruction, and/or weight based dosing when appropriate to reduce radiation dose to as low as reasonably achievable.        Past Medical History:   Diagnosis Date    BPH (benign prostatic hypertrophy)     CAD (coronary artery disease)     sees Dr. Luly Wray in [place] annually    Cardiomyopathy [facility])     CHF (congestive heart failure) ([facility]     LVEF 40% on echo 6/2014    COPD (chronic obstructive pulmonary disease) ([address]     has O2 rarely uses, has nebulizer    Hypertension     On home oxygen therapy     Type II or unspecified type diabetes mellitus without mention of complication, not stated as uncontrolled        Assessment//Plan           Hospital Problems           Last Modified POA    Cardiac arrest ([address] 1/24/2022 Yes      r/o seizures  Twitching  vfib  ESRD  COPD  ESRD  PAF  CAD    Assessment & Plan    1/25: Continue with hypothermic protocol follow-up cardiology and nephrology evaluation. Follow-up neurology, start [address] for twitching possible seizures, will get EEG done. Ativan as needed for seizures. Monitor labs. Spoke with nursing about the care, prognosis is guarded, palliative care  1/26: Patient getting HD today. Follow-up neurology for repeating CT scan and possible evaluation for anoxic brain injury. EP has seen the patient, echo patient has left ventricular EF of 15%. Prognosis guarded. Follow-up potassium level. Follow-up renal for maintenance dialysis and hyperkalemia. 1/27: Patient getting EEG done. Prognosis poor. Continue with current management. Follow-up palliative care. Spoke with nursing about the care Fu neuro about ? Anoxic brain injury  1/28: Patient will get dialysis today.   Follow-up EEG report, follow-up MRI, FU neuro, c/w vent support, monitor labs LE duplex was negative for DVT, c/w heparin  active electronically signed by Marcos Mcfadden MD on 1/25/22 at 9:21 AM EST

## 2022-01-28 NOTE — PROGRESS NOTES
Pharmacy Vancomycin Consult     Current Dosing: Last 1250mg x 1 post HD 1/26      Recent Labs     01/25/22  2201 01/26/22  0157 01/26/22  0520 01/26/22  0800 01/27/22  0323   BUN  --  49*  --  52*  --    CREATININE   < > 6.80* 7.2* 7.03*  --    WBC  --   --   --   --  15.8*    < > = values in this interval not displayed. Intake/Output Summary (Last 24 hours) at 1/28/2022 1228  Last data filed at 1/28/2022 0713  Gross per 24 hour   Intake 1757.9 ml   Output 20 ml   Net 1737.9 ml     Cultures  Recent Labs     01/25/22  0142 01/24/22 2000 01/24/22  1937   BLOODCULT2 No Growth to date. Any change in status will be called. --   --    LABGRAM  --  Few WBC's  Rare epithelial cells  Many Gram positive cocci in pairs, chains, clusters  Rare Budding yeast    --    ORG  --  Yeast*  --    COVID19  --   --  DETECTED*     Height: 5' 7\" (170.2 cm), Weight: 177 lb 11.1 oz (80.6 kg), Body mass index is 27.83 kg/m². Estimated Creatinine Clearance: 9 mL/min (A) (based on SCr of 7.03 mg/dL [facility])). Hemodialysis Intake (ml): 400 mlDialyzer Clearance: Lightly streaked. Trough:  Recent Labs     01/28/22  0444   VANCORANDOM 18.1      Assessment/Plan:  Pre-HD level therapeutic for goal. Will continue 1250mg post HD today. Will schedule random level for Monday as appears patient has been getting MWF. Timing of future trough levels may be adjusted based on culture results, renal function, and clinical response.     Thank you,  Gulshan Alvarez, [facility] PharmD

## 2022-01-29 NOTE — PROGRESS NOTES
Progress Note  Date:2022       Room:[place] [address]  Patient Karen Jean     YOB: 1945     Age:76 y.o. Subjective    Subjective     Review of Systems  ROS: could not be obtained bc pt is intubated  Objective         Vitals Last 24 Hours:  TEMPERATURE:  Temp  Av.3 °F (35.7 °C)  Min: 95.4 °F (35.2 °C)  Max: 97.3 °F (36.3 °C)  RESPIRATIONS RANGE: Resp  Av.7  Min: 0  Max: 29  PULSE OXIMETRY RANGE: SpO2  Av.8 %  Min: 95 %  Max: 100 %  PULSE RANGE: Pulse  Av.1  Min: 60  Max: 80  BLOOD PRESSURE RANGE: No data recorded.  ; No data recorded. I/O (24Hr): Intake/Output Summary (Last 24 hours) at 2022 1215  Last data filed at 2022 1031  Gross per 24 hour   Intake 3179.93 ml   Output 1450 ml   Net 1729.93 ml     Objective:  General Appearance:  Ill-appearing. Vital signs: (most recent): Blood pressure (!) 121/52, pulse 60, temperature 95.4 °F (35.2 °C), resp. rate 29, height 5' 7\" (1.702 m), weight 177 lb 11.1 oz (80.6 kg), SpO2 100 %. HEENT: (+ ET tube, neck is supple)    Heart: S1 normal and S2 normal.    Abdomen: Abdomen is soft. Bowel sounds are normal.   There is no epigastric area or suprapubic area tenderness. Pulses: Distal pulses are intact. Neurological: (Sedated). Skin:  Warm and dry. Labs/Imaging/Diagnostics    Labs:  CBC:  Recent Labs     22  0323 22  0558   WBC 15.8* 16.1*   RBC 2.67* 2.64*   HGB 8.5* 8.6*   HCT 25.8* 25.6*   MCV 96.6 96.8   RDW 17.4* 17.1*    151     CHEMISTRIES:  Recent Labs     22  0557   *   K 4.2   CL 87*   CO2 22   BUN 42*   CREATININE 4.91*   GLUCOSE 158*     PT/INR:  No results for input(s): PROTIME, INR in the last 72 hours. APTT:  No results for input(s): APTT in the last 72 hours.   LIVER PROFILE:  Recent Labs     22  0557   AST 16   ALT 24   BILITOT 0.4   ALKPHOS 94       Imaging Last 24 Hours:  CT Head WO Contrast    Result Date: 2022  EXAMINATION: CT of the brain without contrast HISTORY: Cardiac arrest. Fall. COMPARISON: None available TECHNIQUE: Multiple contiguous axial images were obtained of the brain from the skull base through the vertex. Multiplanar reformats were obtained. FINDINGS: Prominence of the sulci and ventricles compatible with mild generalized parenchymal volume loss. Areas of bilateral supratentorial white matter hypoattenuation are nonspecific but most likely related to chronic small vessel ischemic changes in a patient of this age. Gray-white matter differentiation is preserved. No acute hemorrhage or abnormal extra-axial fluid collection. No mass effect or midline shift. The visualized paranasal sinuses and mastoid air cells are clear. Calvarium is intact. Endotracheal tube and orogastric tube partially visualized. No acute intracranial process. Generalized parenchymal volume loss and nonspecific white matter findings most compatible with chronic small vessel ischemic changes in a patient of this age. All CT scans at this facility use dose modulation, iterative reconstruction, and/or weight based dosing when appropriate to reduce radiation dose to as low as reasonably achievable. CT CERVICAL SPINE WO CONTRAST    Result Date: 1/25/2022  EXAM: CT CERVICAL SPINE WO CONTRAST COMPARISON: None available HISTORY:  Cardiac arrest. Fall. TECHNIQUE: Multiple contiguous axial CT images of the cervical spine were obtained. Multiplanar reformats performed. FINDINGS:  Endotracheal tube is present. A radiopaque foreign body is present along the left lateral aspect of the endotracheal tube at the S2 level. Orogastric tube is present and partially visualized. No acute fracture or malalignment. Atlanto-occipital articulation is maintained. Atlantodental interval is preserved. Cervical spinal vertebral body heights are preserved. Degenerative disc disease at C5-6 and C6-7. Mild multilevel facet arthropathy.  Suspect at least mild spinal canal stenosis at C5-6 and C6-7. Varying degrees of osseous neuroforaminal stenosis throughout the cervical spine. There is no prevertebral soft tissue swelling. Areas of septal thickening and small ground glass opacities of both lung apices are nonspecific. Atherosclerotic calcification at the carotid bulbs. No acute fracture or malalignment. 2 cm foreign body along the left aspect of the endotracheal tube at the C2 level. Septal thickening and small groundglass opacities of the lung apices may represent pulmonary edema or may be infectious/inflammatory in etiology. All CT scans at this facility use dose modulation, iterative reconstruction, and/or weight based dosing when appropriate to reduce radiation dose to as low as reasonably achievable. Past Medical History:   Diagnosis Date    BPH (benign prostatic hypertrophy)     CAD (coronary artery disease)     sees Dr. Lucille Clark in [place] annually    Cardiomyopathy [facility])     CHF (congestive heart failure) ([facility]     LVEF 40% on echo 6/2014    COPD (chronic obstructive pulmonary disease) ([facility]     has O2 rarely uses, has nebulizer    Hypertension     On home oxygen therapy     Type II or unspecified type diabetes mellitus without mention of complication, not stated as uncontrolled        Assessment//Plan           Hospital Problems           Last Modified POA    Cardiac arrest ([facility] 1/24/2022 Yes      r/o seizures  Twitching  vfib  ESRD  COPD  ESRD  PAF  CAD    Assessment & Plan    1/25: Continue with hypothermic protocol follow-up cardiology and nephrology evaluation. Follow-up neurology, start [address] for twitching possible seizures, will get EEG done. Ativan as needed for seizures. Monitor labs. Spoke with nursing about the care, prognosis is guarded, palliative care  1/26: Patient getting HD today. Follow-up neurology for repeating CT scan and possible evaluation for anoxic brain injury. EP has seen the patient, echo patient has left ventricular EF of 15%. Prognosis guarded. Follow-up potassium level. Follow-up renal for maintenance dialysis and hyperkalemia. 1/27: Patient getting EEG done. Prognosis poor. Continue with current management. Follow-up palliative care. Spoke with nursing about the care Fu neuro about ? Anoxic brain injury  1/28: Patient will get dialysis today. Follow-up EEG report, follow-up MRI, FU neuro, c/w vent support, monitor labs LE duplex was negative for DVT, c/w heparin. 1/29: Follow-up neurology recommendation, family meeting tomorrow at 1:00. Showed nursing. No overnight events continue current management. Prognosis guarded.  C/w HD, follow cardiology ,continue with heparin  active electronically signed by Isabel Woods MD on 1/25/22 at 9:21 AM EST

## 2022-01-29 NOTE — FLOWSHEET NOTE
Patient's daughter visited this afternoon. All questions answered. She requested a short written note regarding patient's neurological status so that she could remember. Patient has no gag, corneal, pupillary or response to pain. Patient also having issues with thermoregulation and regulating his blood pressure as his BP has become very labile. He also has not been breathing over the ventilator even when sedation had been weaned down prior to my shift.  Electronically signed by Benjamin Jimenes RN on 1/29/2022 at 2:04 PM

## 2022-01-29 NOTE — PROGRESS NOTES
Progress Note  Patient: Gary Johnson  Unit/Bed: IC16/IC16-01  YOB: 1945  MRN: [id number]  Acct: [de-identified]   Admitting Diagnosis: Cardiac arrest ([facility] [I46.9]  Hyperkalemia [E87.5]  Seizure ([facility] [R56.9]  Injury of head, initial encounter [S09.90XA]  Chronic renal failure syndrome, stage 5 ([facility] [N18.5]  Fracture of tooth (traumatic), initial encounter for closed fracture [S02. 5XXA]  Admit Date:  1/24/2022  Hospital Day: 5    Chief Complaint: CPA    Histories:  Past Medical History:   Diagnosis Date    BPH (benign prostatic hypertrophy)     CAD (coronary artery disease)     [facility]    Cardiomyopathy [facility])     CHF (congestive heart failure) ([facility]     LVEF 40% on echo 6/2014    COPD (chronic obstructive pulmonary disease) ([facility]     has O2 rarely uses, has nebulizer    Hypertension     On home oxygen therapy     Type II or unspecified type diabetes mellitus without mention of complication, not stated as uncontrolled      No past surgical history on file.   Family History   Problem Relation Age of Onset    Lung Cancer Mother     Seizures Other      Social History     Socioeconomic History    Marital status:      Spouse name: Not on file    Number of children: Not on file    Years of education: Not on file    Highest education level: Not on file   Occupational History    Not on file   Tobacco Use    Smoking status: Current Every Day Smoker     Packs/day: 1.00     Years: 45.00     Pack years: 45.00    Smokeless tobacco: Never Used   Substance and Sexual Activity    Alcohol use: Yes     Comment: OCCASIONAL    Drug use: No    Sexual activity: Not on file   Other Topics Concern    Not on file   Social History Narrative    Not on file     Social Determinants of Health     Financial Resource Strain:     Difficulty of Paying Living Expenses: Not on file   Food Insecurity:     Worried About [address] in the Last Year: Not on file    Nayan of Food in the Last Year: Not on file   Transportation Needs:     Lack of Transportation (Medical): Not on file    Lack of Transportation (Non-Medical): Not on file   Physical Activity:     Days of Exercise per Week: Not on file    Minutes of Exercise per Session: Not on file   Stress:     Feeling of Stress : Not on file   Social Connections:     Frequency of Communication with Friends and Family: Not on file    Frequency of Social Gatherings with Friends and Family: Not on file    Attends Worship Services: Not on file    Active Member of [address] [place] [address] [employer] or Organizations: Not on file    Attends Club or Organization Meetings: Not on file    Marital Status: Not on file   Intimate Partner Violence:     Fear of Current or Ex-Partner: Not on file    Emotionally Abused: Not on file    Physically Abused: Not on file    Sexually Abused: Not on file   Housing Stability:     Unable to Pay for Housing in the Last Year: Not on file    Number of [place] in the Last Year: Not on file    Unstable Housing in the Last Year: Not on file       Subjective/HPI pt continues have seizures and sedatives are being titrated. Unresponisve. No gag nor corneal reflex. Has had few NSVT overnight. Multiple communications with [facility]/ anabela rep and EP service last PM.      The squad manager was contacted. It now is apparent that the squad did not deliver Defib shocks in the field. Unclear why this was reported differently from the ER that he did receive 3 shocks. 1/17/22 Pt continues to have seizure activity. med titration per Neurology. Unresponsive with no gag reflex. 1/28/22 continued sz activity despite multiple meds. EEG report reviewed. No Arrhthymias - ventricular overnight. Pt remains V paced with underlying AF. Remains vented on 40% FIO2. Unresponsive with no gag nor corneal reflex. 1/29/22 no acute events. Unresponsive.  Remains vented no fever    EKG:  Vpaced 72 with underlying SR today     Review of Systems:   Review of Systems  Unable to obtain.      Physical Examination:    BP (!) 121/52   Pulse 69   Temp 95.4 °F (35.2 °C)   Resp 22   Ht 5' 7\" (1.702 m)   Wt 177 lb 11.1 oz (80.6 kg)   SpO2 98%   BMI 27.83 kg/m²        LABS:  CBC:   Lab Results   Component Value Date    WBC 16.1 01/29/2022    RBC 2.64 01/29/2022    RBC 4.42 05/21/2012    HGB 8.6 01/29/2022    HCT 25.6 01/29/2022    MCV 96.8 01/29/2022    MCH 32.6 01/29/2022    MCHC 33.7 01/29/2022    RDW 17.1 01/29/2022     01/29/2022    MPV 10.4 02/16/2015     CBC with Differential:    Lab Results   Component Value Date    WBC 16.1 01/29/2022    RBC 2.64 01/29/2022    RBC 4.42 05/21/2012    HGB 8.6 01/29/2022    HCT 25.6 01/29/2022     01/29/2022    MCV 96.8 01/29/2022    MCH 32.6 01/29/2022    MCHC 33.7 01/29/2022    RDW 17.1 01/29/2022    LYMPHOPCT 7.0 01/29/2022    MONOPCT 4.0 01/29/2022    BASOPCT 0.0 01/29/2022    MONOSABS 0.6 01/29/2022    LYMPHSABS 1.1 01/29/2022    EOSABS 0.2 01/29/2022    BASOSABS 0.0 01/29/2022     CMP:    Lab Results   Component Value Date     01/29/2022    K 4.2 01/29/2022    CL 87 01/29/2022    CO2 22 01/29/2022    BUN 42 01/29/2022    CREATININE 4.91 01/29/2022    GFRAA 14.0 01/29/2022    LABGLOM 11.6 01/29/2022    GLUCOSE 158 01/29/2022    GLUCOSE 76 05/21/2012    PROT 5.6 01/29/2022    LABALBU 2.9 01/29/2022    LABALBU 4.2 05/21/2012    CALCIUM 8.1 01/29/2022    BILITOT 0.4 01/29/2022    ALKPHOS 94 01/29/2022    AST 16 01/29/2022    ALT 24 01/29/2022     BMP:    Lab Results   Component Value Date     01/29/2022    K 4.2 01/29/2022    CL 87 01/29/2022    CO2 22 01/29/2022    BUN 42 01/29/2022    LABALBU 2.9 01/29/2022    LABALBU 4.2 05/21/2012    CREATININE 4.91 01/29/2022    CALCIUM 8.1 01/29/2022    GFRAA 14.0 01/29/2022    LABGLOM 11.6 01/29/2022    GLUCOSE 158 01/29/2022    GLUCOSE 76 05/21/2012     Magnesium:    Lab Results   Component Value Date    MG 2.3 01/26/2022     Troponin:    Lab Results   Component Value Date    TROPONINI 0.315 01/26/2022        Active Hospital Problems    Diagnosis Date Noted    Cardiac arrest [facility]) [I46.9] 01/24/2022     Priority: Low        Assessment/Plan:  1. CPA - Metabolic and Respiratory related and not primarily a cardiac foci. 2. It is apparent the BiV - ICD device functioned normally and did not deliver inappropriate Treatment. 3. 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 4. COVID PNA- with respiratory failure  5. PAF/AFL- will need SQ Heparin. 6. MINAL/Hyper K- Rx during HD  7. CAD- prior CAD. 8. LE Duplex- no DVT. 9. Echo - LVEF 15%  10. Pt is critically ill. Prognosis appears poor.    6. Family conference       Electronically signed by Nella Curran MD on 1/29/2022 at 2:42 PM

## 2022-01-29 NOTE — PROGRESS NOTES
Subjective:   Chief Complaint:Cardiac Arrest    Patient ID: Angela Gonzalez is a 68 y.o. male followed for cardiac arrest on June 24      HPI: Ongoing refractory myoclonic status. He is on Versed propofol levetiracetam lacosamide all at sufficient doses. Whenever his anesthetic agents are held myoclonus resumes. 10 Point ROS deferred as patient is intubated and sedated    Past Medical History:   Diagnosis Date    BPH (benign prostatic hypertrophy)     CAD (coronary artery disease)     se[facility] Es Del [facility] in [facility]    Cardiomyopathy [facility])     CHF (congestive heart failure) ([facility] [address]     LVEF 40% on echo 6/2014    COPD (chronic obstructive pulmonary disease) ([facility] [address]     has O2 rarely uses, has nebulizer    Hypertension     On home oxygen therapy     Type II or unspecified type diabetes mellitus without mention of complication, not stated as uncontrolled      No past surgical history on file. Patient reports that he has been smoking. He has a 45.00 pack-year smoking history. He has never used smokeless tobacco. He reports current alcohol use. He reports that he does not use drugs.    Family History   Problem Relation Age of Onset    Lung Cancer Mother     Seizures Other      No Known Allergies  Current Facility-Administered Medications   Medication Dose Route Frequency Provider Last Rate Last Admin    dexamethasone (DECADRON) injection 6 mg  6 mg IntraVENous Q12H Yanira Garcia MD   6 mg at 01/29/22 0841    lidocaine 2 % injection 5 mL  5 mL IntraDERmal Once Denny Davidson, APRN - CNP        sodium chloride flush 0.9 % injection 5-40 mL  5-40 mL IntraVENous 2 times per day Denny Davidson APRN - CNP   10 mL at 01/28/22 2041    sodium chloride flush 0.9 % injection 5-40 mL  5-40 mL IntraVENous PRN Cassandria Lety, APRN - CNP        0.9 % sodium chloride infusion  25 mL IntraVENous PRN Marcieandria Lety, APRN - CNP        0.9 % sodium chloride infusion  250 mL IntraVENous Once Cassandria Lety, APRN - CNP  propofol injection  5-50 mcg/kg/min IntraVENous Titrated Carlos Pathak MD 24.4 mL/hr at 01/29/22 1216 50 mcg/kg/min at 01/29/22 1216    lacosamide (VIMPAT) 100 mg in dextrose 5 % 60 mL IVPB  100 mg IntraVENous BID Partha Hanna  mL/hr at 01/29/22 1232 100 mg at 01/29/22 1232    midazolam (VERSED) 100 mg in dextrose 5 % 100 mL infusion  1-10 mg/hr IntraVENous Continuous Macel Macleod, APRN - CNP 6 mL/hr at 01/29/22 1031 6 mg/hr at 01/29/22 1031    heparin (porcine) 25,000 Units in dextrose 5 % 250 mL infusion  5-30 Units/kg/hr IntraVENous Continuous Saundra Sotelo MD 8.9 mL/hr at 01/29/22 1029 11 Units/kg/hr at 01/29/22 1029    vancomycin (VANCOCIN) intermittent dosing (placeholder)   Other RX Placeholder Redd Guzmán MD        fentaNYL (SUBLIMAZE) 1,000 mcg in sodium chloride 0.9 % 100 mL infusion  12.5-200 mcg/hr IntraVENous Continuous Redd Guzmán MD 20 mL/hr at 01/29/22 0300 200 mcg/hr at 01/29/22 0300    heparin (porcine) injection 4,000 Units  4,000 Units IntraVENous PRN Redd Guzmán MD   4,000 Units at 01/29/22 0223    heparin (porcine) injection 2,000 Units  2,000 Units IntraVENous PRN Redd Guzmán MD   2,000 Units at 01/28/22 1918    levETIRAcetam (KEPPRA) 500 mg in sodium chloride 0.9 % 100 mL IVPB  500 mg IntraVENous Daily Antonio Turcios MD   Stopped at 01/28/22 1029    LORazepam (ATIVAN) injection 2 mg  2 mg IntraVENous Q6H PRN Antonio Turcios MD   2 mg at 01/26/22 0841    pantoprazole (PROTONIX) injection 40 mg  40 mg IntraVENous Daily Macel Macleod, APRN - CNP   40 mg at 01/29/22 0841    And    sodium chloride (PF) 0.9 % injection 10 mL  10 mL IntraVENous Daily Macel Macleod, APRN - CNP   10 mL at 01/26/22 0916    insulin lispro (HUMALOG) injection vial 0-12 Units  0-12 Units SubCUTAneous Q4H Maria Luz Murphy APRN - CNP   2 Units at 01/28/22 2112    sennosides-docusate sodium (SENOKOT-S) 8.6-50 MG tablet 2 tablet  2 tablet Oral BID ESTHER Dee - CNP   2 tablet at 01/29/22 0839    glucose (GLUTOSE) 40 % oral gel 15 g  15 g Oral PRN Henretta Dubin, APRN - CNP        dextrose 50 % IV solution  12.5 g IntraVENous PRN Henretta Dubin, APRN - CNP        glucagon (rDNA) injection 1 mg  1 mg IntraMUSCular PRN Henretta Dubin, APRN - CNP        dextrose 5 % solution  100 mL/hr IntraVENous PRN Henretta Dubin, APRN - CNP        norepinephrine (LEVOPHED) 16 mg in dextrose 5 % 250 mL infusion  2-100 mcg/min IntraVENous Continuous Samantha Otto MD 11.3 mL/hr at 01/29/22 1031 12 mcg/min at 01/29/22 1031    vasopressin 20 Units in dextrose 5 % 100 mL infusion  0.01-0.04 Units/min IntraVENous Continuous Samantha Otto MD   Stopped at 01/25/22 0957    sodium chloride flush 0.9 % injection 5-40 mL  5-40 mL IntraVENous 2 times per day Samantha Otto MD   10 mL at 01/28/22 2041    sodium chloride flush 0.9 % injection 5-40 mL  5-40 mL IntraVENous PRN Samantha Otto MD        0.9 % sodium chloride infusion  25 mL IntraVENous PRN Samantha Otto MD        ondansetron (ZOFRAN-ODT) disintegrating tablet 4 mg  4 mg Oral Q8H PRN Samantha Otto MD        Or    ondansetron [facility]) injection 4 mg  4 mg IntraVENous Q6H PRN Samantha Otto MD        polyethylene glycol (GLYCOLAX) packet 17 g  17 g Oral Daily PRN Samantha Otto MD        acetaminophen (TYLENOL) tablet 650 mg  650 mg Oral Q6H PRN Samantha Otto MD        Or    acetaminophen (TYLENOL) suppository 650 mg  650 mg Rectal Q6H PRN Samantha Otto MD        piperacillin-tazobactam (ZOSYN) 2,250 mg in dextrose 5 % 50 mL IVPB (mini-bag)  2,250 mg IntraVENous Q8H Samantha Otto MD   Paused at 01/29/22 0602    chlorhexidine (PERIDEX) 0.12 % solution 15 mL  15 mL Mouth/Throat BID Samantha Otto MD   15 mL at 01/29/22 0841      Problem List     Cardiac arrest [facility]) - Primary         Social History     Tobacco History     Smoking Status  Current Every Day Smoker Smoking Frequency  1 pack/day for 45 years (45 pk yrs)    Smokeless Tobacco Use  Never Used Alcohol History     Alcohol Use Status  Yes Comment  OCCASIONAL          Drug Use     Drug Use Status  No          Sexual Activity     Sexually Active  Not Asked                  Objective (Physical Exam)   BP (!) 121/52   Pulse 71   Temp 95.4 °F (35.2 °C)   Resp 16   Ht 5' 7\" (1.702 m)   Wt 177 lb 11.1 oz (80.6 kg)   SpO2 100%   BMI 27.83 kg/m²         Neuro:     Pupils were 1.5 mm and reactive to light. I once again response to central pain or peripheral pain x4 limbs. Tone was reduced x4 limbs. No myoclonic jerks were noted today. His exam was performed on propofol and midazolam      [unfilled]    Most recent    EEG No valid procedures specified. MRI of Brain No results found for this or any previous visit. No results found for this or any previous visit. MRA of the Head and Neck: No results found for this or any previous visit. No results found for this or any previous visit. No results found for this or any previous visit. CT of the Head: Results for orders placed during the hospital encounter of 01/24/22    CT Head WO Contrast    Narrative  EXAMINATION: CT of the brain without contrast    HISTORY: Cardiac arrest. Fall. COMPARISON: None available    TECHNIQUE: Multiple contiguous axial images were obtained of the brain from the skull base through the vertex. Multiplanar reformats were obtained. FINDINGS:    Prominence of the sulci and ventricles compatible with mild generalized parenchymal volume loss. Areas of bilateral supratentorial white matter hypoattenuation are nonspecific but most likely related to chronic small vessel ischemic changes in a patient  of this age. Gray-white matter differentiation is preserved. No acute hemorrhage or abnormal extra-axial fluid collection. No mass effect or midline shift. The visualized paranasal sinuses and mastoid air cells are clear. Calvarium is intact.  Endotracheal tube and orogastric tube partially visualized. Impression  No acute intracranial process. Generalized parenchymal volume loss and nonspecific white matter findings most compatible with chronic small vessel ischemic changes in a patient of this age. All CT scans at this facility use dose modulation, iterative reconstruction, and/or weight based dosing when appropriate to reduce radiation dose to as low as reasonably achievable. No results found for this or any previous visit. No results found for this or any previous visit. Carotid duplex: No results found for this or any previous visit. No results found for this or any previous visit. No results found for this or any previous visit. Echo No results found for this or any previous visit.         Lab Results   Component Value Date    WBC 16.1 01/29/2022    RBC 2.64 01/29/2022    RBC 4.42 05/21/2012    HGB 8.6 01/29/2022    HCT 25.6 01/29/2022    MCV 96.8 01/29/2022    MCH 32.6 01/29/2022    MCHC 33.7 01/29/2022    RDW 17.1 01/29/2022     01/29/2022    MPV 10.4 02/16/2015     Lab Results   Component Value Date     01/29/2022    K 4.2 01/29/2022    CL 87 01/29/2022    CO2 22 01/29/2022    BUN 42 01/29/2022    CREATININE 4.91 01/29/2022    GFRAA 14.0 01/29/2022    LABGLOM 11.6 01/29/2022    GLUCOSE 158 01/29/2022    GLUCOSE 76 05/21/2012    PROT 5.6 01/29/2022    LABALBU 2.9 01/29/2022    LABALBU 4.2 05/21/2012    CALCIUM 8.1 01/29/2022    BILITOT 0.4 01/29/2022    ALKPHOS 94 01/29/2022    AST 16 01/29/2022    ALT 24 01/29/2022     Lab Results   Component Value Date    PROTIME 21.6 01/25/2022    INR 1.9 01/25/2022     Lab Results   Component Value Date    [id number] 242 03/12/2013    FOLATE 15.6 03/12/2013    IRON 68 03/12/2013    TIBC 286 03/12/2013     Lab Results   Component Value Date    TRIG 61 02/19/2016    HDL 33 02/19/2016    HDL 31 09/07/2011    LDLCALC 74 02/19/2016     No components found for: A1C  No results found for: [address], Dorlene Shelburn, CANNAB, COCAINESCRN, LABMETH, OPIATESCREENURINE, PHENCYCLIDINESCREENURINE, PPXUR, ETOH  No results found for: LITHIUM, DILFRTOT, VALPROATE    Assessment & Plan          68 old gentleman who sustained a cardiac arrest on January 24 with myoclonic status epilepticus. EEG showed no discernible background other than myoclonus. He still has intact pupillary reflexes but has myoclonus every time sedation is weaned. He will need a repeat CT because he cannot have MRI. This is ordered and pending. Please let me know if a conversation between myself and his family members would be helpful in guiding prognosis and decisions.     Critical care time 30 minutes

## 2022-01-29 NOTE — PROGRESS NOTES
Subjective:   Chief Complaint:Cardiac Arrest    Patient ID: Kadie Tao is a 68 y.o. male . HPI: 63-year-old gentleman who unfortunately sustained a cardiac arrest on June 24 and has subsequently had myoclonic seizures. He is now on Versed, propofol, levetiracetam and lacosamide. He is still having super refractory myoclonus and that whenever his propofol is held he resumes myoclonus. When he was off sedation there was no response to stimulation yesterday. CT scan is still pending      ROS not performed due to intubation. Past Medical History:   Diagnosis Date    BPH (benign prostatic hypertrophy)     CAD (coronary artery disease)     se[facility] Mariana Tinsley in [facility]    Cardiomyopathy [facility])     CHF (congestive heart failure) ([facility] [address]     LVEF 40% on echo 6/2014    COPD (chronic obstructive pulmonary disease) ([facility] [address]     has O2 rarely uses, has nebulizer    Hypertension     On home oxygen therapy     Type II or unspecified type diabetes mellitus without mention of complication, not stated as uncontrolled      No past surgical history on file. Patient reports that he has been smoking. He has a 45.00 pack-year smoking history. He has never used smokeless tobacco. He reports current alcohol use. He reports that he does not use drugs.    Family History   Problem Relation Age of Onset    Lung Cancer Mother     Seizures Other      No Known Allergies  Current Facility-Administered Medications   Medication Dose Route Frequency Provider Last Rate Last Admin    vancomycin (VANCOCIN) 1,250 mg in dextrose 5 % 250 mL IVPB  1,250 mg IntraVENous Once Crissy Rubio MD        norepinephrine (LEVOPHED) 64 MCG/ML infusion SOLN             lidocaine 2 % injection 5 mL  5 mL IntraDERmal Once ESTHER Dietz CNP        sodium chloride flush 0.9 % injection 5-40 mL  5-40 mL IntraVENous 2 times per day ESTHER Dietz - CNP   10 mL at 01/27/22 2008    sodium chloride flush 0.9 % injection 5-40 mL 5-40 mL IntraVENous PRN Douglas Solar, APRN - CNP        0.9 % sodium chloride infusion  25 mL IntraVENous PRN Douglas Solar, APRN - CNP        0.9 % sodium chloride infusion  250 mL IntraVENous Once Douglas Solar, APRN - CNP        propofol injection  5-50 mcg/kg/min IntraVENous Titrated Joseph Burch MD 24.4 mL/hr at 01/28/22 1302 50 mcg/kg/min at 01/28/22 1302    lacosamide (VIMPAT) 100 mg in dextrose 5 % 60 mL IVPB  100 mg IntraVENous BID Dorothy Spears MD   Stopped at 01/28/22 1830    midazolam (VERSED) 100 mg in dextrose 5 % 100 mL infusion  1-10 mg/hr IntraVENous Continuous Douglas Solar, APRN - CNP 6 mL/hr at 01/28/22 0713 6 mg/hr at 01/28/22 0713    heparin (porcine) 25,000 Units in dextrose 5 % 250 mL infusion  5-30 Units/kg/hr IntraVENous Continuous Angela Cody MD 4 mL/hr at 01/28/22 0737 5 Units/kg/hr at 01/28/22 0737    vancomycin (VANCOCIN) intermittent dosing (placeholder)   Other RX Placeholder Chula Tsai MD        fentaNYL (SUBLIMAZE) 1,000 mcg in sodium chloride 0.9 % 100 mL infusion  12.5-200 mcg/hr IntraVENous Continuous Chula Tsai MD 17.5 mL/hr at 01/28/22 1004 175 mcg/hr at 01/28/22 1004    heparin (porcine) injection 4,000 Units  4,000 Units IntraVENous PRN Chula Tsai MD        heparin (porcine) injection 2,000 Units  2,000 Units IntraVENous PRN Chula Tsai MD   2,000 Units at 01/28/22 0736    levETIRAcetam (KEPPRA) 500 mg in sodium chloride 0.9 % 100 mL IVPB  500 mg IntraVENous Daily Brody Singh MD   Stopped at 01/28/22 1110    LORazepam (ATIVAN) injection 2 mg  2 mg IntraVENous Q6H PRN Brody Singh MD   2 mg at 01/26/22 0841    pantoprazole (PROTONIX) injection 40 mg  40 mg IntraVENous Daily Douglas Solar, APRN - CNP   40 mg at 01/28/22 1003    And    sodium chloride (PF) 0.9 % injection 10 mL  10 mL IntraVENous Daily ESTHER Asif CNP   10 mL at 01/26/22 0916    insulin lispro (HUMALOG) injection vial 0-12 Units  0-12 Units SubCUTAneous Q4H Roslyn Soldburke Roshan Hernandez APRN - CNP   2 Units at 01/28/22 1009    sennosides-docusate sodium (SENOKOT-S) 8.6-50 MG tablet 2 tablet  2 tablet Oral BID ESTHER Monroe - CNP   2 tablet at 01/26/22 0809    glucose (GLUTOSE) 40 % oral gel 15 g  15 g Oral PRN Judit Barnes APRN - CNP        dextrose 50 % IV solution  12.5 g IntraVENous PRN Judit Barnes APRN - CNP        glucagon (rDNA) injection 1 mg  1 mg IntraMUSCular PRN Judit Barnes, APRN - CNP        dextrose 5 % solution  100 mL/hr IntraVENous PRN ESTHER Monroe - CNP        norepinephrine (LEVOPHED) 16 mg in dextrose 5 % 250 mL infusion  2-100 mcg/min IntraVENous Continuous Tayler Chow MD 9.4 mL/hr at 01/28/22 0713 10 mcg/min at 01/28/22 0713    vasopressin 20 Units in dextrose 5 % 100 mL infusion  0.01-0.04 Units/min IntraVENous Continuous Tayler Chow MD   Stopped at 01/25/22 0957    sodium chloride flush 0.9 % injection 5-40 mL  5-40 mL IntraVENous 2 times per day Tayler Chow MD   10 mL at 01/27/22 2009    sodium chloride flush 0.9 % injection 5-40 mL  5-40 mL IntraVENous PRN Tayler Chow MD        0.9 % sodium chloride infusion  25 mL IntraVENous PRN Tayler Chow MD        ondansetron (ZOFRAN-ODT) disintegrating tablet 4 mg  4 mg Oral Q8H PRN Tayler Chow MD        Or    [facility] 4 mg  4 mg IntraVENous Q6H PRN Tayler Chow MD        polyethylene glycol (GLYCOLAX) packet 17 g  17 g Oral Daily PRN Tayler Chow MD        acetaminophen (TYLENOL) tablet 650 mg  650 mg Oral Q6H PRN Tayler Chow MD        Or    acetaminophen (TYLENOL) suppository 650 mg  650 mg Rectal Q6H PRN Tayler Chow MD        piperacillin-tazobactam (ZOSYN) 2,250 mg in dextrose 5 % 50 mL IVPB (mini-bag)  2,250 mg IntraVENous Q8H Tayler Chow MD   Stopped at 01/28/22 0354    dexamethasone (DECADRON) injection 6 mg  6 mg IntraVENous Q12H Tayler Chow MD   6 mg at 01/27/22 2200    chlorhexidine (PERIDEX) 0.12 % solution 15 mL  15 mL Mouth/Throat JADEN Patel MD   15 mL at 01/28/22 1002      Problem List     Cardiac arrest [facility]) - Primary         Social History     Tobacco History     Smoking Status  Current Every Day Smoker Smoking Frequency  1 pack/day for 45 years (45 pk yrs)    Smokeless Tobacco Use  Never Used          Alcohol History     Alcohol Use Status  Yes Comment  OCCASIONAL          Drug Use     Drug Use Status  No          Sexual Activity     Sexually Active  Not Asked                  Objective (Physical Exam)   BP (!) 121/52   Pulse 68   Temp 97 °F (36.1 °C) (Bladder)   Resp (!) 0   Ht 5' 7\" (1.702 m)   Wt 177 lb 11.1 oz (80.6 kg)   SpO2 99%   BMI 27.83 kg/m²       Neuro:   Pupils 2 mm and reactive to light. No response to central pain. No response to peripheral pain x4 limbs. Reduced tone x4 limbs. No limb myoclonic jerks or facial myoclonic jerks noted today but propofol, midazolam all on board    [unfilled]    Most recent    EEG No valid procedures specified. MRI of Brain No results found for this or any previous visit. No results found for this or any previous visit. MRA of the Head and Neck: No results found for this or any previous visit. No results found for this or any previous visit. No results found for this or any previous visit. CT of the Head: Results for orders placed during the hospital encounter of 01/24/22    CT Head WO Contrast    Narrative  EXAMINATION: CT of the brain without contrast    HISTORY: Cardiac arrest. Fall. COMPARISON: None available    TECHNIQUE: Multiple contiguous axial images were obtained of the brain from the skull base through the vertex. Multiplanar reformats were obtained. FINDINGS:    Prominence of the sulci and ventricles compatible with mild generalized parenchymal volume loss.  Areas of bilateral supratentorial white matter hypoattenuation are nonspecific but most likely related to chronic small vessel ischemic changes in a patient  of this age. Gray-white matter differentiation is preserved. No acute hemorrhage or abnormal extra-axial fluid collection. No mass effect or midline shift. The visualized paranasal sinuses and mastoid air cells are clear. Calvarium is intact. Endotracheal tube and orogastric tube partially visualized. Impression  No acute intracranial process. Generalized parenchymal volume loss and nonspecific white matter findings most compatible with chronic small vessel ischemic changes in a patient of this age. All CT scans at this facility use dose modulation, iterative reconstruction, and/or weight based dosing when appropriate to reduce radiation dose to as low as reasonably achievable. No results found for this or any previous visit. No results found for this or any previous visit. Carotid duplex: No results found for this or any previous visit. No results found for this or any previous visit. No results found for this or any previous visit. Echo No results found for this or any previous visit.         Lab Results   Component Value Date    WBC 15.8 01/27/2022    RBC 2.67 01/27/2022    RBC 4.42 05/21/2012    HGB 8.5 01/27/2022    HCT 25.8 01/27/2022    MCV 96.6 01/27/2022    MCH 32.0 01/27/2022    MCHC 33.1 01/27/2022    RDW 17.4 01/27/2022     01/27/2022    MPV 10.4 02/16/2015     Lab Results   Component Value Date     01/26/2022    K 5.6 01/26/2022    CL 89 01/26/2022    CO2 23 01/26/2022    BUN 52 01/26/2022    CREATININE 7.03 01/26/2022    CREATININE 7.2 01/26/2022    GFRAA 9.3 01/26/2022    LABGLOM 7.6 01/26/2022    GLUCOSE 128 01/26/2022    GLUCOSE 76 05/21/2012    PROT 6.3 01/24/2022    LABALBU 3.7 01/24/2022    LABALBU 4.2 05/21/2012    CALCIUM 8.4 01/26/2022    BILITOT 0.4 01/24/2022    ALKPHOS 107 01/24/2022    AST 21 01/24/2022    ALT 18 01/24/2022     Lab Results   Component Value Date    PROTIME 21.6 01/25/2022    INR 1.9 01/25/2022     Lab Results Component Value Date    YUAHFKDJ32 242 03/12/2013    FOLATE 15.6 03/12/2013    IRON 68 03/12/2013    TIBC 286 03/12/2013     Lab Results   Component Value Date    TRIG 61 02/19/2016    HDL 33 02/19/2016    HDL 31 09/07/2011    LDLCALC 74 02/19/2016     No components found for: A1C  No results found for: Maylon Shingles, LABBENZ, CANNAB, COCAINESCRN, LABMETH, OPIATESCREENURINE, PHENCYCLIDINESCREENURINE, PPXUR, ETOH  No results found for: LITHIUM, DILFRTOT, VALPROATE    Assessment & Plan      49-year-old gentleman who sustained a cardiac arrest on January 24 who unfortunate has developed myoclonic status epilepticus. Repeat CT is pending. He cannot have an MRI. His myoclonus is very refractory and I am concerned that should he survive this event he may have lifelong refractory epilepsy. Please let me know if a conversation between myself and his family would be helpful.

## 2022-01-29 NOTE — PROGRESS NOTES
Patient ID:  Emily Qiu  [id number]  68 y.o.  1945    Assumed Care of Patient. Disaster Documentation initiated as per policy / SOP. Report Received from Vinnie Quinn. Assessment Complete, please see flow sheets. Labs, orders, plan of care and meds reviewed. 1910  Dialysis completed. 1 liter removed. 2100 Versed titrated to 5 mg, with twitching and seizure-like activity noted. Fentanyl increased and Versed placed back on 6 mg/hr. CT notified of seizures. [address] from  [facility]. Labs:   Recent Labs     01/25/22  2201 01/26/22  0520 01/27/22  0323   WBC  --   --  15.8*   HGB 9.4* 8.5* 8.5*   HCT  --   --  25.8*   PLT  --   --  140     Recent Labs     01/26/22  0157 01/26/22  0520 01/26/22  0600 01/26/22  0800   *  --   --  128*   K 5.4*  --  5.7* 5.6*   CL 89*  --   --  89*   CO2 23  --   --  23   BUN 49*  --   --  52*   CREATININE 6.80* 7.2*  --  7.03*   CALCIUM 8.4*  --   --  8.4*   PHOS 5.0*  --   --  5.5*     No results for input(s): AST, ALT, BILIDIR, BILITOT, ALKPHOS in the last 72 hours.             Electronically signed by Steven Landis RN

## 2022-01-29 NOTE — PROGRESS NOTES
Nephrology Progress Note    Assessment:  MINAL d/t hypotension  Myoclonus activity  DM type-2  COPD  Hypertension  BPH Anemia        Plan: continue to support  Neuro status poor off sedation  Family to help decide on future    Patient Active Problem List:     Hypertension     COPD (chronic obstructive pulmonary disease) ([facility])     Benign prostatic hyperplasia     CAD (coronary artery disease)     Type II or unspecified type diabetes mellitus without mention of complication, not stated as uncontrolled     Cardiomyopathy ([facility] [address]     Hyperlipidemia     Neuropathy     CKD (chronic kidney disease) stage 3, GFR 30-59 ml/min ([facility])     Tobacco abuse     Diabetic neuropathy ([facility])     Cardiac arrest ([facility] [address]      Subjective:  Admit Date: 1/24/2022    Interval History: sedated  No response noted off sedation    Medications:  Scheduled Meds:   dexamethasone  6 mg IntraVENous Q12H    lidocaine  5 mL IntraDERmal Once    sodium chloride flush  5-40 mL IntraVENous 2 times per day    lacosamide (VIMPAT) IVPB  100 mg IntraVENous BID    vancomycin (VANCOCIN) intermittent dosing (placeholder)   Other RX Placeholder    levetiracetam  500 mg IntraVENous Daily    pantoprazole  40 mg IntraVENous Daily    And    sodium chloride (PF)  10 mL IntraVENous Daily    insulin lispro  0-12 Units SubCUTAneous Q4H    sennosides-docusate sodium  2 tablet Oral BID    sodium chloride flush  5-40 mL IntraVENous 2 times per day    piperacillin-tazobactam  2,250 mg IntraVENous Q8H    chlorhexidine  15 mL Mouth/Throat BID     Continuous Infusions:   sodium chloride      sodium chloride      propofol 50 mcg/kg/min (01/29/22 1031)    midazolam 6 mg/hr (01/29/22 1031)    heparin (PORCINE) Infusion 11 Units/kg/hr (01/29/22 1029)    fentaNYL (SUBLIMAZE) infusion 200 mcg/hr (01/29/22 0300)    dextrose      norepinephrine (LEVOPHED) infusion 12 mcg/min (01/29/22 1031)    vasopressin (Septic Shock) infusion Stopped (01/25/22 0957)    sodium chloride CBC:   Recent Labs     01/27/22  0323 01/29/22  0558   WBC 15.8* 16.1*   HGB 8.5* 8.6*    151     CMP:    Recent Labs     01/29/22  0557   *   K 4.2   CL 87*   CO2 22   BUN 42*   CREATININE 4.91*   GLUCOSE 158*   CALCIUM 8.1*   LABGLOM 11.6*     Troponin: No results for input(s): TROPONINI in the last 72 hours. BNP: No results for input(s): BNP in the last 72 hours. INR: No results for input(s): INR in the last 72 hours. Lipids: No results for input(s): CHOL, LDLDIRECT, TRIG, HDL, AMYLASE, LIPASE in the last 72 hours. Liver:   Recent Labs     01/29/22  0557   AST 16   ALT 24   ALKPHOS 94   PROT 5.6*   LABALBU 2.9*   BILITOT 0.4     Iron:  No results for input(s): IRONS, FERRITIN in the last 72 hours. Invalid input(s): LABIRONS  Urinalysis: No results for input(s): UA in the last 72 hours.     Objective:  Vitals: BP (!) 121/52   Pulse 60   Temp 95.4 °F (35.2 °C)   Resp 29   Ht 5' 7\" (1.702 m)   Wt 177 lb 11.1 oz (80.6 kg)   SpO2 100%   BMI 27.83 kg/m²    Wt Readings from Last 3 Encounters:   01/26/22 177 lb 11.1 oz (80.6 kg)   10/27/21 160 lb (72.6 kg)   06/16/16 176 lb (79.8 kg)      24HR INTAKE/OUTPUT:      Intake/Output Summary (Last 24 hours) at 1/29/2022 1156  Last data filed at 1/29/2022 1031  Gross per 24 hour   Intake 3179.93 ml   Output 1450 ml   Net 1729.93 ml       General:not alert, in no apparent distress  HEENT: normocephalic, atraumatic, anicteric  Et in place  Neck: supple, no mass  Lungs: non-labored respirations, clear to auscultation bilaterally  Heart: regular rate and rhythm, no murmurs or rubs  Abdomen: soft, non-tender, non-distended  Ext: no cyanosis, no peripheral edema atrophy muscles  Neuro: alert and oriented, no gross abnormalities  Psych: normal mood and affect  Skin: no rash      Electronically signed by Irineo Gaspar DO, MD

## 2022-01-29 NOTE — PROGRESS NOTES
Pharmacy Vancomycin Consult     Current Dosin mg post HD     Temp 24hr max:  97.3 F    Recent Labs     22  0323 22  0557 22  0558   BUN  --  42*  --    CREATININE  --  4.91*  --    WBC 15.8*  --  16.1*       Intake/Output Summary (Last 24 hours) at 2022 1212  Last data filed at 2022 1031  Gross per 24 hour   Intake 3179.93 ml   Output 1450 ml   Net 1729.93 ml     Cultures  Recent Labs     22  0142 22  1937   BLOODCULT2 No Growth to date. Any change in status will be called. --   --    LABGRAM  --  Few WBC's  Rare epithelial cells  Many Gram positive cocci in pairs, chains, clusters  Rare Budding yeast    --    ORG  --  Yeast*  --    COVID19  --   --  DETECTED*     Height: 5' 7\" (170.2 cm), Weight: 177 lb 11.1 oz (80.6 kg), Body mass index is 27.83 kg/m². Estimated Creatinine Clearance: 13 mL/min (A) (based on SCr of 4.91 mg/dL (H)). Hemodialysis Intake (ml): 200 mlDialyzer Clearance: Lightly streaked. Trough:  Recent Labs     22  0444   VANCORANDOM 18.1      Assessment/Plan:  No current active orders for future hemodialysis, however there is a random level scheduled for Monday,  pre-dialysis. Patient received a dose of 1750 mg after dialysis on . Therefore, will continue current dosing of 1250 mg post-HD. With next level pre-dialysis . Timing of future trough levels may be adjusted based on culture results, renal function, and clinical response.     Thank you,    Ramirez Griffin, PharmD  PGY-1 Pharmacy Resident  2022 12:20 PM

## 2022-01-29 NOTE — PROGRESS NOTES
Pulmonary ICU Progress Note    PRIMARY SERVICE: Pulmonary Disease    INTERVAL HPI: Patient seen and examined at bedside, Interval Notes, orders reviewed. Nursing notes noted    Patient is on vent Support with assist control with rate of 22 tidal volume 400 FiO2 40% PEEP of 5. .  Bernis Mura pressure is 23. He is on propofol, Versed and fentanyl drip. He is on Levophed for hypotension. He is receiving heparin drip. Temperature 95.4. No vomiting or diarrhea. Review of Systems   unable to obtain        Intake/Output Summary (Last 24 hours) at 1/29/2022 1257  Last data filed at 1/29/2022 1031  Gross per 24 hour   Intake 3179.93 ml   Output 1450 ml   Net 1729.93 ml       Vitals:  BP (!) 121/52   Pulse 71   Temp 95.4 °F (35.2 °C)   Resp 16   Ht 5' 7\" (1.702 m)   Wt 177 lb 11.1 oz (80.6 kg)   SpO2 100%   BMI 27.83 kg/m²   EXAM:  General: Orally intubated, comfortable in bed, No distress. Head: Atraumatic ,Normocephalic   Eyes: PERRL. No sclera icterus. No conjunctival injection. No discharge   ENT: No nasal  discharge. Pharynx clear. Neck:  Trachea midline. No thyromegaly, no JVD, No cervical adenopathy. Resp : Normal effort,  No accessory muscle use. No Rales. No wheezing. No rhonchi. CV: Normal  rate. Regular rhythm. No mumur ,  Rub or gallop  ABD: Non-tender. Non-distended. No masses. No organmegaly. Normal bowel sounds. No hernia.   EXT: No Pitting, No Cyanosis No clubbing  CNS: Sedated      ABG:     Lab Results   Component Value Date    PHART 7.420 01/26/2022    MTB7QED 38 01/26/2022    PO2ART 75 01/26/2022    GVY6DEZ 24.7 01/26/2022    BEART 0 01/26/2022    Y0SDKRJZ 95 01/26/2022     Lab Results   Component Value Date    LACTA 1.2 01/26/2022     O2 Device: Ventilator  O2 Flow Rate (L/min): 50 L/min    MV Settings:     Vent Mode: AC/VC  Vt Ordered: 400 mL  Rate Set: 22 bmp  FiO2 : 40 %  PEEP/CPAP: 5  Peak Inspiratory Pressure: 46 cmH2O  Plateau Pressure: 23 cmH20  Mean Airway Pressure: 18 cmH20  I:E Ratio: 1:2.40    Diet NPO  ADULT TUBE FEEDING; Orogastric; Renal Formula; Continuous; 25; No; 50; Q 6 hours; Protein; add 1 daily    IV:    sodium chloride      sodium chloride      propofol 50 mcg/kg/min (01/29/22 1216)    midazolam 6 mg/hr (01/29/22 1031)    heparin (PORCINE) Infusion 11 Units/kg/hr (01/29/22 1029)    fentaNYL (SUBLIMAZE) infusion 200 mcg/hr (01/29/22 0300)    dextrose      norepinephrine (LEVOPHED) infusion 12 mcg/min (01/29/22 1031)    vasopressin (Septic Shock) infusion Stopped (01/25/22 0957)    sodium chloride         Medications:  Scheduled Meds:   dexamethasone  6 mg IntraVENous Q12H    lidocaine  5 mL IntraDERmal Once    sodium chloride flush  5-40 mL IntraVENous 2 times per day    lacosamide (VIMPAT) IVPB  100 mg IntraVENous BID    vancomycin (VANCOCIN) intermittent dosing (placeholder)   Other RX Placeholder    levetiracetam  500 mg IntraVENous Daily    pantoprazole  40 mg IntraVENous Daily    And    sodium chloride (PF)  10 mL IntraVENous Daily    insulin lispro  0-12 Units SubCUTAneous Q4H    sennosides-docusate sodium  2 tablet Oral BID    sodium chloride flush  5-40 mL IntraVENous 2 times per day    piperacillin-tazobactam  2,250 mg IntraVENous Q8H    chlorhexidine  15 mL Mouth/Throat BID       PRN Meds:  sodium chloride flush, sodium chloride, heparin (porcine), heparin (porcine), LORazepam, glucose, dextrose, glucagon (rDNA), dextrose, sodium chloride flush, sodium chloride, ondansetron **OR** ondansetron, polyethylene glycol, acetaminophen **OR** acetaminophen        Radiology      Echocardiogram complete 2D with doppler with color    Result Date: 1/25/2022  Transthoracic Echocardiography Report (TTE)  Demographics   Patient Name    Sera Duong Gender               Male   Patient Number  [id number]       Race                                                   Ethnicity   Visit Number    [id number]      Room Number          IC16   Corporate ID Date of Study        01/25/2022   Accession       [id number]     Referring Physician  Mike Lindquist MD  Number   Date of Birth   1945     Sonographer          Alan Chow   Age             68 year(s)     Interpreting         [facility]                                 Physician            Cardiology                                                      Holiday Greg ASHFORD DO  Procedure Type of Study   TTE procedure:ECHO COMPLETE 2D W/DOP W/COLOR. Procedure Date Date: 01/25/2022 Start: 03:59 PM Study Location: Portable Technical Quality: Adequate visualization Indications:Cardiac arrest. Patient Status: Routine Height: 67 inches Weight: 160 pounds BSA: 1.84 m^2 BMI: 25.06 kg/m^2 BP: 160/90 mmHg  Conclusions   Summary  Left ventricular ejection fraction is visually estimated at 15%. global  hypokinesis. Left ventricular size is severely increased . Right ventricle global systolic function is mildly reduced . The left atrium is Mildly dilated. Trace (+) mitral regurgitation is present. No evidence of aortic valve regurgitation . No evidence of aortic valve stenosis. Signature   ----------------------------------------------------------------  Electronically signed by Gabby Barragan DO(Interpreting  physician) on 01/25/2022 05:52 PM  ----------------------------------------------------------------   Findings  Left Ventricle Left ventricular ejection fraction is visually estimated at 15%. Left ventricular size is severely increased . Right Ventricle Normal size right ventricle cavity. Right ventricle global systolic function is mildly reduced . Left Atrium The left atrium is Mildly dilated. Right Atrium Normal right atrium. Mitral Valve Diffusely thickened and pliable mitral valve leaflets with normal excursion. Trace (+) mitral regurgitation is present. No evidence of mitral valve stenosis. Tricuspid Valve Normal tricuspid valve structure and function.  Aortic Valve No evidence of aortic valve regurgitation . No evidence of aortic valve stenosis. Pulmonic Valve Normal pulmonic valve structure and function. Pericardial Effusion No evidence of pericardial effusion. Pleural Effusion No evidence of pleural effusion. Aorta \ Miscellaneous Miscellaneous normal findings were found. M-Mode Measurements (cm)   LVIDd: 4.91 cm                                    LVIDs: 4.63 cm  IVSd: 1.58 cm                                     IVSs: 1.38 cm  LVPWd: 1.21 cm                                    LVPWs: 1.16 cm  Rt. Vent. Dimension: 2.55 cm  Doppler Measurements:    MV Peak E-Wave: 0.35 m/s   MV Peak A-Wave: 1.08 m/s  Valves  Mitral Valve   Peak E-Wave: 0.35 m/s                 Peak A-Wave: 1.08 m/s                                        E/A Ratio: 0.33                                        Peak Gradient: 0.5 mmHg                                        Deceleration Time: 178.4 msec   Tissue Doppler   E' Septal Velocity: 0.03 m/s  E' Lateral Velocity: 0.03 m/s  Structures  Left Ventricle   Diastolic Dimension: 6.22 cm          Systolic Dimension: 3.87 cm  Septum Diastolic: 1.59 cm             Septum Systolic: 3.51 cm  PW Diastolic: 0.47 cm                 PW Systolic: 1.25 cm                                        FS: 5.7 %  LV EDV/LV EDV Index: 113.44 ml/62 m^2 LV ESV/LV ESV Index: 99.02 ml/54 m^2  EF Calculated: 12.7 %                 LV Length: 9.19 cm   Right Ventricle   Diastolic Dimension: 9.80 cm      CT Head WO Contrast    Result Date: 1/25/2022  EXAMINATION: CT of the brain without contrast HISTORY: Cardiac arrest. Fall. COMPARISON: None available TECHNIQUE: Multiple contiguous axial images were obtained of the brain from the skull base through the vertex. Multiplanar reformats were obtained. FINDINGS: Prominence of the sulci and ventricles compatible with mild generalized parenchymal volume loss.  Areas of bilateral supratentorial white matter hypoattenuation are nonspecific but most likely related to chronic small vessel ischemic changes in a patient of this age. Gray-white matter differentiation is preserved. No acute hemorrhage or abnormal extra-axial fluid collection. No mass effect or midline shift. The visualized paranasal sinuses and mastoid air cells are clear. Calvarium is intact. Endotracheal tube and orogastric tube partially visualized. No acute intracranial process. Generalized parenchymal volume loss and nonspecific white matter findings most compatible with chronic small vessel ischemic changes in a patient of this age. All CT scans at this facility use dose modulation, iterative reconstruction, and/or weight based dosing when appropriate to reduce radiation dose to as low as reasonably achievable. CT CERVICAL SPINE WO CONTRAST    Result Date: 1/25/2022  EXAM: CT CERVICAL SPINE WO CONTRAST COMPARISON: None available HISTORY:  Cardiac arrest. Fall. TECHNIQUE: Multiple contiguous axial CT images of the cervical spine were obtained. Multiplanar reformats performed. FINDINGS:  Endotracheal tube is present. A radiopaque foreign body is present along the left lateral aspect of the endotracheal tube at the S2 level. Orogastric tube is present and partially visualized. No acute fracture or malalignment. Atlanto-occipital articulation is maintained. Atlantodental interval is preserved. Cervical spinal vertebral body heights are preserved. Degenerative disc disease at C5-6 and C6-7. Mild multilevel facet arthropathy. Suspect at least mild spinal canal stenosis at C5-6 and C6-7. Varying degrees of osseous neuroforaminal stenosis throughout the cervical spine. There is no prevertebral soft tissue swelling. Areas of septal thickening and small ground glass opacities of both lung apices are nonspecific. Atherosclerotic calcification at the carotid bulbs. No acute fracture or malalignment. 2 cm foreign body along the left aspect of the endotracheal tube at the C2 level.  Septal thickening and small available. After discussing the procedure and possible complications with the patient, informed consent was obtained. The patient was placed on the Special Procedures table. The right upper extremity was sterilely prepared using 2% chlorhexidine and then draped with sterile towels and a body-sized sterile drape. All personnel in the Special Procedures Room donned caps and surgical masks. In addition, the  and first assistant donned sterile gowns and gloves after proper hand cleansing. A pre-procedure time out was performed in order to assure the correct patient and procedure. Local anesthetic was administered. A peripheral vein was accessed with sonographic guidance. A sonographic spot image was obtained for documentation. A guidewire was advanced into the vein with fluoroscopic guidance and a sheath was placed over the guidewire. A 5-[place] triple-lumen PICC was advanced through the sheath, into the basilic vein, up the arm and into the central vasculature. It was positioned appropriately. The sheath was removed. The catheter was shown to aspirate and infuse properly. The flange of the catheter was affixed to the arm using a PICC securement device. A spot image of the chest showed the tip of the PICC line to lie in the right atrium. The patient tolerated the procedure well and without complications. CONCLUSION: SUCCESSFUL PICC PLACEMENT WITHOUT IMMEDIATE COMPLICATIONS. US DUP UPPER EXTREMITY LEFT VENOUS    Result Date: 1/27/2022  US DUP UPPER EXTREMITY LEFT VENOUS : 1/27/2022 CLINICAL HISTORY:  DVT  LEFT arm . Left upper extremity edema. Covid-19 positive. COMPARISON: Cervical spine CT and portable chest radiograph 1/24/2022. Grayscale, compression, color and waveform Doppler analysis of the left lower extremity deep venous system was performed with augmentation. FINDINGS: Mild to moderate ill-defined subcutaneous edema is present, predominantly of the forearm.  A small amount of moderately echogenic partially occluding thrombus is present within the mid left internal jugular vein in the neck, which may possibly related to a previous left subclavian AICD placement. The visualized aspect of the presumed left radiocephalic AV fistula is widely patent, with a short segment not visualized secondary to bandages. There is no other significant venous thrombosis, abnormal masses, organized fluid collections or other findings of concern identified within the left upper extremity. SMALL AMOUNT OF PARTIALLY OCCLUDING MODERATELY ECHOGENIC MID LEFT INTERNAL JUGULAR VENOUS THROMBOSIS,, WHICH MAY BE CHRONIC SECONDARY TO A PREVIOUS LEFT SUBCLAVIAN APPROACH AICD PLACEMENT. NO OTHER LEFT UPPER EXTREMITY VENOUS THROMBOSIS IDENTIFIED. 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 US DUP LOWER EXTREMITIES BILATERAL VENOUS    Result Date: 1/25/2022  Venous duplex ultrasound of the right and left lower extremity HISTORY:  DVT COMPARISON:  None available TECHNIQUE: Judbryson Schiller scale, duplex Doppler, with compression and augmentation sonography of the deep venous system of the right and left  lower extremity was performed by a registered sonographer and the images were submitted for interpretation. FINDINGS:  The common femoral, femoral, and popliteal veins demonstrate normal color flow, augmentation, and compressibility. No venous duplex ultrasound evidence of DVT in the right or left lower extremity. No sonographic evidence of deep venous thrombosis within the  right or left  lower extremity. Results:  CBC:   Recent Labs     01/27/22  0323 01/29/22  0558   WBC 15.8* 16.1*   HGB 8.5* 8.6*   HCT 25.8* 25.6*   MCV 96.6 96.8    151     :   Recent Labs     01/29/22  0557   *   K 4.2   CL 87*   CO2 22   BUN 42*   CREATININE 4.91*     LIVER PROFILE:   Recent Labs     01/29/22  0557   AST 16   ALT 24   BILITOT 0.4   ALKPHOS 94         Assessment:   This is a critically ill patient at risk of deterioration / death , needing close ICU monitoring and intervention due to below noted problems    1. Cardiopulmonary arrest, possible hypoxic encephalopathy  2. Ventricular tachycardia per EMS  3. Acute respiratory failure status post intubation on vent  4. Persistent seizure activity myoclonus  5. COVID-19 pneumonia  6. Circulatory shock  7. Lactic acidosis  8. End-stage renal disease  9. Chronic anemia    Suggestion:  Continue vent support with lung protective strategy. Patient has significant downtime probably has hypoxic brain injury. Continue ICU care for hemodynamic and airway monitoring. Continue steroid. Continue vasopressor. Maintain MAP 65-70. Dialysis as per nephrology. Neurology is following. Poor prognosis. Patient has Ortega  catheter , Patient is on DVT and GI prophylaxis. Continue antibiotic. Continue vent support and supportive care. Critical care time spent reviewing labs/films, examining patient, collaborating with otherphysicians but excluding procedures for life threatening organ failure is 32 minutes.       SIGNATURE: Charlotte Frost MD, [facility]

## 2022-01-30 NOTE — FLOWSHEET NOTE
1215: Patient's daughter in to see patient at bedside. 1300: Family meeting with Dr. Dee Dee Jasmine regarding patient's condition. Patient made a [facility] at this time and family escorted to see patient through door. Patients family shortly after chose to make patient comfortable and orders were received from Dr. Dee Dee Jasmine. Patient extubated at approximately 1438. Patient asystole on monitor at 1448. No heartsounds or respiratory effort noted. Dr. Dee Dee Jasmine notified and Dr. Osmin Baxter came up to pronounce patient. Maria Isabel 59: [facility] referral [id number]. Released to  home. Patient's family wishes patient to go to [address] Phone number: [phone].  Electronically signed by Benjamin Jimenes RN on 2022 at 3:18 PM

## 2022-01-30 NOTE — SIGNIFICANT EVENT
I was asked by attending to pronounce the patient. No spontaneous respirations or movements. No heart or lung sounds present. No palpable pulses. Pupils are fixed and dilated. Absent corneal and gag reflex. Patient declared  at 200 during my exam. Noted asystole on monitor at 1448 which was likely the true time of passing. Next of kin notified. Mortality services will be informed. May Mr. Hugo Galeano rest in peace.     [facility],   2022, 3:40 PM

## 2022-01-30 NOTE — PROGRESS NOTES
Pulmonary ICU Progress Note    PRIMARY SERVICE: Pulmonary Disease    INTERVAL HPI: Patient seen and examined at bedside, Interval Notes, orders reviewed. Nursing notes noted    Patient is on vent Support with assist control with rate of 22 tidal volume 400 FiO2 40% PEEP of 5. .  [place] pressure is 17. O2 saturation 98% he is on propofol, Versed and fentanyl drip. He is on Levophed for hypotension. He is receiving heparin drip. Temperature 96.3 no vomiting or diarrhea. Review of Systems   unable to obtain        Intake/Output Summary (Last 24 hours) at 1/30/2022 1103  Last data filed at 1/30/2022 0650  Gross per 24 hour   Intake 2357.31 ml   Output 130 ml   Net 2227.31 ml       Vitals:  BP (!) 121/52   Pulse 60   Temp 96.3 °F (35.7 °C) (Rectal)   Resp 25   Ht 5' 7\" (1.702 m)   Wt 177 lb 11.1 oz (80.6 kg)   SpO2 98%   BMI 27.83 kg/m²   EXAM:  General: Orally intubated, comfortable in bed, No distress. Head: Atraumatic ,Normocephalic   Eyes: PERRL. No sclera icterus. No conjunctival injection. No discharge   ENT: No nasal  discharge. Pharynx clear. Neck:  Trachea midline. No thyromegaly, no JVD, No cervical adenopathy. Resp : Normal effort,  No accessory muscle use. No Rales. No wheezing. No rhonchi. CV: Normal  rate. Regular rhythm. No mumur ,  Rub or gallop  ABD: Non-tender. Non-distended. No masses. No organmegaly. Normal bowel sounds. No hernia.   EXT: No Pitting, No Cyanosis No clubbing  CNS: Sedated      ABG:     Lab Results   Component Value Date    PHART 7.369 01/30/2022    JWI3VSC 42 01/30/2022    PO2ART 75 01/30/2022    KWF9JXS 24.1 01/30/2022    BEART -1 01/30/2022    V8TTYRDS 94 01/30/2022     Lab Results   Component Value Date    LACTA 1.2 01/26/2022     O2 Device: Ventilator  O2 Flow Rate (L/min): 50 L/min    MV Settings:     Vent Mode: AC/VC  Vt Ordered: 400 mL  Rate Set: 22 bmp  FiO2 : 40 %  PEEP/CPAP: 5  Peak Inspiratory Pressure: 43 cmH2O  Plateau Pressure: (S) 17 cmH20  Mean Airway Pressure: 18 cmH20  I:E Ratio: 1:2.40    Diet NPO  ADULT TUBE FEEDING; Orogastric; Renal Formula; Continuous; 25; No; 50; Q 6 hours; Protein; add 1 daily    IV:    sodium chloride      sodium chloride      propofol 50 mcg/kg/min (01/30/22 0926)    midazolam 6 mg/hr (01/30/22 0857)    heparin (PORCINE) Infusion 15 Units/kg/hr (01/30/22 1101)    fentaNYL (SUBLIMAZE) infusion 200 mcg/hr (01/30/22 1101)    dextrose      norepinephrine (LEVOPHED) infusion 15 mcg/min (01/30/22 8372)    vasopressin (Septic Shock) infusion Stopped (01/25/22 0957)    sodium chloride         Medications:  Scheduled Meds:   dexamethasone  6 mg IntraVENous Q12H    lidocaine  5 mL IntraDERmal Once    sodium chloride flush  5-40 mL IntraVENous 2 times per day    lacosamide (VIMPAT) IVPB  100 mg IntraVENous BID    vancomycin (VANCOCIN) intermittent dosing (placeholder)   Other RX Placeholder    levetiracetam  500 mg IntraVENous Daily    pantoprazole  40 mg IntraVENous Daily    And    sodium chloride (PF)  10 mL IntraVENous Daily    insulin lispro  0-12 Units SubCUTAneous Q4H    sennosides-docusate sodium  2 tablet Oral BID    sodium chloride flush  5-40 mL IntraVENous 2 times per day    piperacillin-tazobactam  2,250 mg IntraVENous Q8H    chlorhexidine  15 mL Mouth/Throat BID       PRN Meds:  sodium chloride flush, sodium chloride, heparin (porcine), heparin (porcine), LORazepam, glucose, dextrose, glucagon (rDNA), dextrose, sodium chloride flush, sodium chloride, ondansetron **OR** ondansetron, polyethylene glycol, acetaminophen **OR** acetaminophen        Radiology      Echocardiogram complete 2D with doppler with color    Result Date: 1/25/2022  Transthoracic Echocardiography Report (TTE)  Demographics   Patient Name    Nile Cipro Gender               Male   Patient Number  [id number]       Race                                                   Ethnicity   Visit Number    [id number]      Room Number IC16   Corporate ID                   Date of Study        01/25/2022   Accession       [id number]     Referring Physician  Farideh Licea MD  Number   Date of Birth   1945     Sonographer          Manuel Mesa   Age             68 year(s)     Interpreting         [facility]                                 Physician            Cardiology                                                      Holiday Greg ASHFORD DO  Procedure Type of Study   TTE procedure:ECHO COMPLETE 2D W/DOP W/COLOR. Procedure Date Date: 01/25/2022 Start: 03:59 PM Study Location: Portable Technical Quality: Adequate visualization Indications:Cardiac arrest. Patient Status: Routine Height: 67 inches Weight: 160 pounds BSA: 1.84 m^2 BMI: 25.06 kg/m^2 BP: 160/90 mmHg  Conclusions   Summary  Left ventricular ejection fraction is visually estimated at 15%. global  hypokinesis. Left ventricular size is severely increased . Right ventricle global systolic function is mildly reduced . The left atrium is Mildly dilated. Trace (+) mitral regurgitation is present. No evidence of aortic valve regurgitation . No evidence of aortic valve stenosis. Signature   ----------------------------------------------------------------  Electronically signed by Kiya Jaquez DO(Interpreting  physician) on 01/25/2022 05:52 PM  ----------------------------------------------------------------   Findings  Left Ventricle Left ventricular ejection fraction is visually estimated at 15%. Left ventricular size is severely increased . Right Ventricle Normal size right ventricle cavity. Right ventricle global systolic function is mildly reduced . Left Atrium The left atrium is Mildly dilated. Right Atrium Normal right atrium. Mitral Valve Diffusely thickened and pliable mitral valve leaflets with normal excursion. Trace (+) mitral regurgitation is present. No evidence of mitral valve stenosis. Tricuspid Valve Normal tricuspid valve structure and function.  Aortic Valve No evidence of aortic valve regurgitation . No evidence of aortic valve stenosis. Pulmonic Valve Normal pulmonic valve structure and function. Pericardial Effusion No evidence of pericardial effusion. Pleural Effusion No evidence of pleural effusion. Aorta \ Miscellaneous Miscellaneous normal findings were found. M-Mode Measurements (cm)   LVIDd: 4.91 cm                                    LVIDs: 4.63 cm  IVSd: 1.58 cm                                     IVSs: 1.38 cm  LVPWd: 1.21 cm                                    LVPWs: 1.16 cm  Rt. Vent. Dimension: 2.55 cm  Doppler Measurements:    MV Peak E-Wave: 0.35 m/s   MV Peak A-Wave: 1.08 m/s  Valves  Mitral Valve   Peak E-Wave: 0.35 m/s                 Peak A-Wave: 1.08 m/s                                        E/A Ratio: 0.33                                        Peak Gradient: 0.5 mmHg                                        Deceleration Time: 178.4 msec   Tissue Doppler   E' Septal Velocity: 0.03 m/s  E' Lateral Velocity: 0.03 m/s  Structures  Left Ventricle   Diastolic Dimension: 2.71 cm          Systolic Dimension: 5.61 cm  Septum Diastolic: 0.22 cm             Septum Systolic: 1.72 cm  PW Diastolic: 6.29 cm                 PW Systolic: 3.29 cm                                        FS: 5.7 %  LV EDV/LV EDV Index: 113.44 ml/62 m^2 LV ESV/LV ESV Index: 99.02 ml/54 m^2  EF Calculated: 12.7 %                 LV Length: 9.19 cm   Right Ventricle   Diastolic Dimension: 6.67 cm      CT Head WO Contrast    Result Date: 1/25/2022  EXAMINATION: CT of the brain without contrast HISTORY: Cardiac arrest. Fall. COMPARISON: None available TECHNIQUE: Multiple contiguous axial images were obtained of the brain from the skull base through the vertex. Multiplanar reformats were obtained. FINDINGS: Prominence of the sulci and ventricles compatible with mild generalized parenchymal volume loss.  Areas of bilateral supratentorial white matter hypoattenuation are nonspecific but most likely related to chronic small vessel ischemic changes in a patient of this age. Gray-white matter differentiation is preserved. No acute hemorrhage or abnormal extra-axial fluid collection. No mass effect or midline shift. The visualized paranasal sinuses and mastoid air cells are clear. Calvarium is intact. Endotracheal tube and orogastric tube partially visualized. No acute intracranial process. Generalized parenchymal volume loss and nonspecific white matter findings most compatible with chronic small vessel ischemic changes in a patient of this age. All CT scans at this facility use dose modulation, iterative reconstruction, and/or weight based dosing when appropriate to reduce radiation dose to as low as reasonably achievable. CT CERVICAL SPINE WO CONTRAST    Result Date: 1/25/2022  EXAM: CT CERVICAL SPINE WO CONTRAST COMPARISON: None available HISTORY:  Cardiac arrest. Fall. TECHNIQUE: Multiple contiguous axial CT images of the cervical spine were obtained. Multiplanar reformats performed. FINDINGS:  Endotracheal tube is present. A radiopaque foreign body is present along the left lateral aspect of the endotracheal tube at the S2 level. Orogastric tube is present and partially visualized. No acute fracture or malalignment. Atlanto-occipital articulation is maintained. Atlantodental interval is preserved. Cervical spinal vertebral body heights are preserved. Degenerative disc disease at C5-6 and C6-7. Mild multilevel facet arthropathy. Suspect at least mild spinal canal stenosis at C5-6 and C6-7. Varying degrees of osseous neuroforaminal stenosis throughout the cervical spine. There is no prevertebral soft tissue swelling. Areas of septal thickening and small ground glass opacities of both lung apices are nonspecific. Atherosclerotic calcification at the carotid bulbs. No acute fracture or malalignment. 2 cm foreign body along the left aspect of the endotracheal tube at the C2 level.  Septal thickening and small groundglass opacities of the lung apices may represent pulmonary edema or may be infectious/inflammatory in etiology. All CT scans at this facility use dose modulation, iterative reconstruction, and/or weight based dosing when appropriate to reduce radiation dose to as low as reasonably achievable. XR CHEST PORTABLE    Result Date: 1/25/2022  Exam: XR CHEST PORTABLE History:  intubation Technique: AP portable view of the chest obtained. Comparison: none Chest x-ray portable Findings: The patient is intubated  with the tip of the endotracheal tube at the thoracic inlet. There is a  AICD device in place The cardiomediastinal silhouette is within normal limits. There is no pneumothorax. There are mild increased markings throughout both lungs. There is minimal blunting of the left costophrenic recess consistent with a small left pleural effusion. . Bones of the thorax appear intact. There are small left pleural effusion. Status post intubation. There are no consolidations or infiltrates. XR CHEST PORTABLE    Result Date: 1/25/2022  Exam: XR CHEST PORTABLE History:  central line Technique: AP portable view of the chest obtained. Comparison: none Chest x-ray portable Findings: The patient is intubated, there is an NG tube coursing towards the stomach    There is a   central line with the tip projecting in the region of the superior vena cava. There is a  AICD device in place The cardiomediastinal silhouette is within normal limits. There are mild increased markings throughout both lungs. There are no large effusions or consolidations. Bones of the thorax appear intact. There are mild increased pulmonary markings in both lungs which may be bilateral infiltrates, COVID-19 pneumonia.      IR PICC WO SQ PORT/PUMP > 5 YEARS    Result Date: 1/27/2022  EXAMINATION: IR PICC WO SQ PORT/PUMP > 5 YEARS DATE AND TIME:1/26/2022 6:17 PM CLINICAL HISTORY: I46.9 Cardiac arrest [facility]) ICD10   access forearm. A small amount of moderately echogenic partially occluding thrombus is present within the mid left internal jugular vein in the neck, which may possibly related to a previous left subclavian AICD placement. The visualized aspect of the presumed left radiocephalic AV fistula is widely patent, with a short segment not visualized secondary to bandages. There is no other significant venous thrombosis, abnormal masses, organized fluid collections or other findings of concern identified within the left upper extremity. SMALL AMOUNT OF PARTIALLY OCCLUDING MODERATELY ECHOGENIC MID LEFT INTERNAL JUGULAR VENOUS THROMBOSIS,, WHICH MAY BE CHRONIC SECONDARY TO A PREVIOUS LEFT SUBCLAVIAN APPROACH AICD PLACEMENT. NO OTHER LEFT UPPER EXTREMITY VENOUS THROMBOSIS IDENTIFIED. 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 US DUP LOWER EXTREMITIES BILATERAL VENOUS    Result Date: 1/25/2022  Venous duplex ultrasound of the right and left lower extremity HISTORY:  DVT COMPARISON:  None available TECHNIQUE: Abdulkadir White scale, duplex Doppler, with compression and augmentation sonography of the deep venous system of the right and left  lower extremity was performed by a registered sonographer and the images were submitted for interpretation. FINDINGS:  The common femoral, femoral, and popliteal veins demonstrate normal color flow, augmentation, and compressibility. No venous duplex ultrasound evidence of DVT in the right or left lower extremity. No sonographic evidence of deep venous thrombosis within the  right or left  lower extremity.        Results:  CBC:   Recent Labs     01/29/22  0558 01/30/22  0513 01/30/22  0846   WBC 16.1*  --  21.2*   HGB 8.6* 9.4* 8.6*   HCT 25.6*  --  26.4*   MCV 96.8  --  98.0     --  172     :   Recent Labs     01/29/22  0557 01/30/22  0513 01/30/22  0600   *  --  125*   K 4.2  --  4.7   CL 87*  --  85*   CO2 22  --  20   BUN 42*  --  59*   CREATININE 4.91* 5.3* 5.47*     LIVER PROFILE:   Recent Labs     01/29/22  0557 01/30/22  0600   AST 16 17   ALT 24 21   BILITOT 0.4 0.3   ALKPHOS 94 91         Assessment: This is a critically ill patient at risk of deterioration / death , needing close ICU monitoring and intervention due to below noted problems    1. Cardiopulmonary arrest, possible hypoxic encephalopathy  2. Ventricular tachycardia per EMS  3. Acute respiratory failure status post intubation on vent  4. Persistent seizure activity myoclonus  5. COVID-19 pneumonia  6. Circulatory shock  7. Lactic acidosis  8. End-stage renal disease  9. Chronic anemia    Suggestion:  Continue vent support with lung protective strategy. Patient has significant downtime probably has hypoxic brain injury. Continue ICU care for hemodynamic and airway monitoring. Continue steroid. Continue vasopressor. Maintain MAP 65-70. Dialysis as per nephrology. Neurology is following. continue antibiotic. Continue vent support and supportive care. Poor prognosis. discussed with Dr. Feli Cardozo. He will meet with family this afternoon. .  Critical care time spent reviewing labs/films, examining patient, collaborating with otherphysicians but excluding procedures for life threatening organ failure is 31 minutes.       SIGNATURE: Malina Rosenbaum MD, [facility]

## 2022-01-30 NOTE — PROGRESS NOTES
Nephrology Progress Note    Assessment:  MINAL  Resp arrest in foild  DM type-2  OHDX CAD  COPD      Plan:3% to assist Na+  Dialysis tomorrow  Family to consider compassionate wean    Patient Active Problem List:     Hypertension     COPD (chronic obstructive pulmonary disease) ([facility]     Benign prostatic hyperplasia     CAD (coronary artery disease)     Type II or unspecified type diabetes mellitus without mention of complication, not stated as uncontrolled     Cardiomy[facility]     Hyperlipidemia     Neuropathy     CKD (chronic kidney disease) stage 3, GFR 30-59 ml/min ([facility])     Tobacco abuse     Diabetic neuropathy ([facility]     Cardiac arrest ([facility]      Subjective:  Admit Date: 1/24/2022    Interval History: sedated    Medications:  Scheduled Meds:   dexamethasone  6 mg IntraVENous Q12H    lidocaine  5 mL IntraDERmal Once    sodium chloride flush  5-40 mL IntraVENous 2 times per day    lacosamide (VIMPAT) IVPB  100 mg IntraVENous BID    vancomycin (VANCOCIN) intermittent dosing (placeholder)   Other RX Placeholder    levetiracetam  500 mg IntraVENous Daily    pantoprazole  40 mg IntraVENous Daily    And    sodium chloride (PF)  10 mL IntraVENous Daily    insulin lispro  0-12 Units SubCUTAneous Q4H    sennosides-docusate sodium  2 tablet Oral BID    sodium chloride flush  5-40 mL IntraVENous 2 times per day    piperacillin-tazobactam  2,250 mg IntraVENous Q8H    chlorhexidine  15 mL Mouth/Throat BID     Continuous Infusions:   sodium chloride      sodium chloride      sodium chloride      propofol 50 mcg/kg/min (01/30/22 1145)    midazolam 6 mg/hr (01/30/22 1145)    heparin (PORCINE) Infusion 15 Units/kg/hr (01/30/22 1145)    fentaNYL (SUBLIMAZE) infusion 200 mcg/hr (01/30/22 1145)    dextrose      norepinephrine (LEVOPHED) infusion 16 mcg/min (01/30/22 1145)    vasopressin (Septic Shock) infusion Stopped (01/25/22 0957)    sodium chloride         CBC:   Recent Labs     01/29/22  1098 01/29/22  0558 01/30/22  0513 01/30/22  0846   WBC 16.1*  --   --  21.2*   HGB 8.6*   < > 9.4* 8.6*     --   --  172    < > = values in this interval not displayed. CMP:    Recent Labs     01/29/22  0557 01/30/22  0513 01/30/22  0600   *  --  125*   K 4.2  --  4.7   CL 87*  --  85*   CO2 22  --  20   BUN 42*  --  59*   CREATININE 4.91* 5.3* 5.47*   GLUCOSE 158*  --  161*   CALCIUM 8.1*  --  8.4*   LABGLOM 11.6* 11* 10.2*     Troponin: No results for input(s): TROPONINI in the last 72 hours. BNP: No results for input(s): BNP in the last 72 hours. INR: No results for input(s): INR in the last 72 hours. Lipids: No results for input(s): CHOL, LDLDIRECT, TRIG, HDL, AMYLASE, LIPASE in the last 72 hours. Liver:   Recent Labs     01/30/22  0600   AST 17   ALT 21   ALKPHOS 91   PROT 6.0*   LABALBU 2.9*   BILITOT 0.3     Iron:  No results for input(s): IRONS, FERRITIN in the last 72 hours. Invalid input(s): LABIRONS  Urinalysis: No results for input(s): UA in the last 72 hours.     Objective:  Vitals: BP (!) 121/52   Pulse 64   Temp 96.3 °F (35.7 °C) (Rectal)   Resp (!) 0   Ht 5' 7\" (1.702 m)   Wt 177 lb 11.1 oz (80.6 kg)   SpO2 97%   BMI 27.83 kg/m²    Wt Readings from Last 3 Encounters:   01/26/22 177 lb 11.1 oz (80.6 kg)   10/27/21 160 lb (72.6 kg)   06/16/16 176 lb (79.8 kg)      24HR INTAKE/OUTPUT:      Intake/Output Summary (Last 24 hours) at 1/30/2022 1149  Last data filed at 1/30/2022 1145  Gross per 24 hour   Intake 2912.95 ml   Output 130 ml   Net 2782.95 ml       General: not alert, in no apparent distress  HEENT: normocephalic, atraumatic, anicteric  Et in place  Neck: supple, no mass  Lungs: non-labored respirations, clear to auscultation bilaterally  Heart: regular rate and rhythm, no murmurs or rubs  Abdomen: soft, non-tender, non-distended  Ext: no cyanosis, no peripheral edema ms atrophy  Neuro: alert and oriented, no gross abnormalities  Psych: normal mood and affect  Skin: no rash      Electronically signed by Jennifer Fuller DO, MD

## 2022-01-30 NOTE — PROGRESS NOTES
Progress Note  Patient: Gael Rodriguez  Unit/Bed: IC16/IC16-01  YOB: 1945  MRN: [id number]  Acct: [de-identified]   Admitting Diagnosis: Cardiac arrest ([facility] [I46.9]  Hyperkalemia [E87.5]  Seizure ([facility] [R56.9]  Injury of head, initial encounter [S09.90XA]  Chronic renal failure syndrome, stage 5 ([facility] [N18.5]  Fracture of tooth (traumatic), initial encounter for closed fracture [S02. 5XXA]  Admit Date:  1/24/2022  Hospital Day: 6    Chief Complaint: CPA    Histories:  Past Medical History:   Diagnosis Date    BPH (benign prostatic hypertrophy)     CAD (coronary artery disease)     sees Dr. Kong Chen in [facility])     CHF (congestive heart failure) ([facility]     LVEF 40% on echo 6/2014    COPD (chronic obstructive pulmonary disease) ([facility]     has O2 rarely uses, has nebulizer    Hypertension     On home oxygen therapy     Type II or unspecified type diabetes mellitus without mention of complication, not stated as uncontrolled      No past surgical history on file.   Family History   Problem Relation Age of Onset    Lung Cancer Mother     Seizures Other      Social History     Socioeconomic History    Marital status:      Spouse name: Not on file    Number of children: Not on file    Years of education: Not on file    Highest education level: Not on file   Occupational History    Not on file   Tobacco Use    Smoking status: Current Every Day Smoker     Packs/day: 1.00     Years: 45.00     Pack years: 45.00    Smokeless tobacco: Never Used   Substance and Sexual Activity    Alcohol use: Yes     Comment: OCCASIONAL    Drug use: No    Sexual activity: Not on file   Other Topics Concern    Not on file   Social History Narrative    Not on file     Social Determinants of Health     Financial Resource Strain:     Difficulty of Paying Living Expenses: Not on file   Food Insecurity:     Worried About [address] in the Last Year: Not on file    Nayan of Food in the Last Year: Not on file   Transportation Needs:     Lack of Transportation (Medical): Not on file    Lack of Transportation (Non-Medical): Not on file   Physical Activity:     Days of Exercise per Week: Not on file    Minutes of Exercise per Session: Not on file   Stress:     Feeling of Stress : Not on file   Social Connections:     Frequency of Communication with Friends and Family: Not on file    Frequency of Social Gatherings with Friends and Family: Not on file    Attends [employer] Services: Not on file    Active Member of [address] [place] [address] [employer] or Organizations: Not on file    Attends Club or Organization Meetings: Not on file    Marital Status: Not on file   Intimate Partner Violence:     Fear of Current or Ex-Partner: Not on file    Emotionally Abused: Not on file    Physically Abused: Not on file    Sexually Abused: Not on file   Housing Stability:     Unable to Pay for Housing in the Last Year: Not on file    Number of [place] in the Last Year: Not on file    Unstable Housing in the Last Year: Not on file       Subjective/HPI pt continues have seizures and sedatives are being titrated. Unresponisve. No gag nor corneal reflex. Has had few NSVT overnight. Multiple communications with [facility]/ anabela rep and EP service last PM.      The squad manager was contacted. It now is apparent that the squad did not deliver Defib shocks in the field. Unclear why this was reported differently from the ER that he did receive 3 shocks. 1/17/22 Pt continues to have seizure activity. med titration per Neurology. Unresponsive with no gag reflex. 1/28/22 continued sz activity despite multiple meds. EEG report reviewed. No Arrhthymias - ventricular overnight. Pt remains V paced with underlying AF. Remains vented on 40% FIO2. Unresponsive with no gag nor corneal reflex. 1/29/22 no acute events. Unresponsive. Remains vented no fever    1/30/22 no new events overnight.   sz when sedation weaned. No fever remains vented on 40% FIO2    EKG:  Vpaced 72 with underlying SR today     Review of Systems:   Review of Systems  Unable to obtain.      Physical Examination:    BP (!) 121/52   Pulse 60   Temp 96.3 °F (35.7 °C) (Rectal)   Resp (!) 0   Ht 5' 7\" (1.702 m)   Wt 177 lb 11.1 oz (80.6 kg)   SpO2 97%   BMI 27.83 kg/m²        LABS:  CBC:   Lab Results   Component Value Date    WBC 21.2 01/30/2022    RBC 2.70 01/30/2022    RBC 4.42 05/21/2012    HGB 8.6 01/30/2022    HCT 26.4 01/30/2022    MCV 98.0 01/30/2022    MCH 32.0 01/30/2022    MCHC 32.6 01/30/2022    RDW 17.3 01/30/2022     01/30/2022    MPV 10.4 02/16/2015     CBC with Differential:    Lab Results   Component Value Date    WBC 21.2 01/30/2022    RBC 2.70 01/30/2022    RBC 4.42 05/21/2012    HGB 8.6 01/30/2022    HCT 26.4 01/30/2022     01/30/2022    MCV 98.0 01/30/2022    MCH 32.0 01/30/2022    MCHC 32.6 01/30/2022    RDW 17.3 01/30/2022    LYMPHOPCT 9.0 01/30/2022    MONOPCT 3.7 01/30/2022    BASOPCT 0.2 01/30/2022    MONOSABS 0.8 01/30/2022    LYMPHSABS 1.9 01/30/2022    EOSABS 0.1 01/30/2022    BASOSABS 0.0 01/30/2022     CMP:    Lab Results   Component Value Date     01/30/2022    K 4.7 01/30/2022    CL 85 01/30/2022    CO2 20 01/30/2022    BUN 59 01/30/2022    CREATININE 5.47 01/30/2022    CREATININE 5.3 01/30/2022    GFRAA 12.4 01/30/2022    LABGLOM 10.2 01/30/2022    GLUCOSE 161 01/30/2022    GLUCOSE 76 05/21/2012    PROT 6.0 01/30/2022    LABALBU 2.9 01/30/2022    LABALBU 4.2 05/21/2012    CALCIUM 8.4 01/30/2022    BILITOT 0.3 01/30/2022    ALKPHOS 91 01/30/2022    AST 17 01/30/2022    ALT 21 01/30/2022     BMP:    Lab Results   Component Value Date     01/30/2022    K 4.7 01/30/2022    CL 85 01/30/2022    CO2 20 01/30/2022    BUN 59 01/30/2022    LABALBU 2.9 01/30/2022    LABALBU 4.2 05/21/2012    CREATININE 5.47 01/30/2022    CREATININE 5.3 01/30/2022    CALCIUM 8.4 01/30/2022    GFRAA 12.4 01/30/2022 LABGLOM 10.2 01/30/2022    GLUCOSE 161 01/30/2022    GLUCOSE 76 05/21/2012     Magnesium:    Lab Results   Component Value Date    MG 2.3 01/26/2022     Troponin:    Lab Results   Component Value Date    TROPONINI 0.315 01/26/2022        Active Hospital Problems    Diagnosis Date Noted    Cardiac arrest [facility]) [I46.9] 01/24/2022     Priority: Low        Assessment/Plan:  1. CPA - Metabolic and Respiratory related and not primarily a cardiac foci. 2. It is apparent the BiV - ICD device functioned normally and did not deliver inappropriate Treatment. 3. 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 4. COVID PNA- with respiratory failure  5. PAF/AFL- will need SQ Heparin. 6. MINAL/Hyper K- Rx during HD  7. CAD- prior CAD. 8. LE Duplex- no DVT. 9. Echo - LVEF 15%  10. Pt is critically ill. Prognosis appears poor.    6. Family conference for possible compassionate wean       Electronically signed by Alban Canales MD on 1/30/2022 at 1:27 PM

## 2022-01-30 NOTE — PROGRESS NOTES
Subjective:   Chief Complaint:Cardiac Arrest    Patient ID: Judith Laguerre is a 68 y.o. male followed for cardiac arrest on June 24, with ongoing refractory myoclonic status      HPI: Ongoing treatment with Versed propofol levetiracetam and lacosamide all insufficient doses. When his anesthetic agents are held once again he has full body myoclonus thus we have not been able to wean sedation. He continues to have reactive pupils. His corneal examination of the rest of the brainstem examination is confounded by sedation      10 Point ROS ordered due to intubation status    Past Medical History:   Diagnosis Date    BPH (benign prostatic hypertrophy)     CAD (coronary artery disease)     [facility] Lucille [facility]    Cardiomyopathy [facility])     CHF (congestive heart failure) ([facility]     LVEF 40% on echo 6/2014    COPD (chronic obstructive pulmonary disease) ([facility]     has O2 rarely uses, has nebulizer    Hypertension     On home oxygen therapy     Type II or unspecified type diabetes mellitus without mention of complication, not stated as uncontrolled      No past surgical history on file. Patient reports that he has been smoking. He has a 45.00 pack-year smoking history. He has never used smokeless tobacco. He reports current alcohol use. He reports that he does not use drugs.    Family History   Problem Relation Age of Onset    Lung Cancer Mother     Seizures Other      No Known Allergies  Current Facility-Administered Medications   Medication Dose Route Frequency Provider Last Rate Last Admin    sodium chloride 3 % solution  50 mL/hr IntraVENous Continuous Lodema Jose Garvey DO        LORazepam (ATIVAN) injection 0.5 mg  0.5 mg IntraVENous Q15 Min PRN Comfort Trinh MD        HYDROmorphone (DILAUDID) injection 0.5 mg  0.5 mg IntraVENous Q15 Min PRN Comfort Trinh MD        glycopyrrolate (ROBINUL) injection 0.2 mg  0.2 mg IntraVENous Q4H PRN Comfort Trinh MD        dexamethasone (DECADRON) injection 6 mg  6 mg IntraVENous Q12H Gladis James MD   6 mg at 01/30/22 0834    lidocaine 2 % injection 5 mL  5 mL IntraDERmal Once Clarke Shirlene, APRN - CNP        sodium chloride flush 0.9 % injection 5-40 mL  5-40 mL IntraVENous 2 times per day Clarke Shirlene, APRN - CNP   10 mL at 01/29/22 2059    sodium chloride flush 0.9 % injection 5-40 mL  5-40 mL IntraVENous PRN Clarke Shirlene, APRN - CNP        0.9 % sodium chloride infusion  25 mL IntraVENous PRN Clarke Shirlene, APRN - CNP        0.9 % sodium chloride infusion  250 mL IntraVENous Once Clarke Shirlene, APRN - CNP        propofol injection  5-50 mcg/kg/min IntraVENous Titrated Ramirez Shi MD 24.4 mL/hr at 01/30/22 1145 50 mcg/kg/min at 01/30/22 1145    lacosamide (VIMPAT) 100 mg in dextrose 5 % 60 mL IVPB  100 mg IntraVENous BID Akil Krishna MD   Stopped at 01/30/22 0940    midazolam (VERSED) 100 mg in dextrose 5 % 100 mL infusion  1-10 mg/hr IntraVENous Continuous Clarke Shirlene, APRN - CNP 6 mL/hr at 01/30/22 1145 6 mg/hr at 01/30/22 1145    heparin (porcine) 25,000 Units in dextrose 5 % 250 mL infusion  5-30 Units/kg/hr IntraVENous Continuous Saundra Sotelo MD 12.1 mL/hr at 01/30/22 1145 15 Units/kg/hr at 01/30/22 1145    vancomycin (VANCOCIN) intermittent dosing (placeholder)   Other RX Placeholder Gladis James MD        fentaNYL (SUBLIMAZE) 1,000 mcg in sodium chloride 0.9 % 100 mL infusion  12.5-200 mcg/hr IntraVENous Continuous Gladis James MD 20 mL/hr at 01/30/22 1145 200 mcg/hr at 01/30/22 1145    heparin (porcine) injection 4,000 Units  4,000 Units IntraVENous PRN Gladis James MD   4,000 Units at 01/29/22 0223    heparin (porcine) injection 2,000 Units  2,000 Units IntraVENous PRN Gladis James MD   2,000 Units at 01/30/22 0122    levETIRAcetam (KEPPRA) 500 mg in sodium chloride 0.9 % 100 mL IVPB  500 mg IntraVENous Daily Mary Rincon MD   Stopped at 01/30/22 0859    LORazepam (ATIVAN) injection 2 mg  2 mg IntraVENous Q6H PRN Yoana Garcias MD   2 mg at 01/26/22 0841    pantoprazole (PROTONIX) injection 40 mg  40 mg IntraVENous Daily Lashell Hait, APRN - CNP   40 mg at 01/30/22 3899    And    sodium chloride (PF) 0.9 % injection 10 mL  10 mL IntraVENous Daily Lashell Hait, APRN - CNP   10 mL at 01/30/22 0846    insulin lispro (HUMALOG) injection vial 0-12 Units  0-12 Units SubCUTAneous Q4H Lashell Hait, APRN - CNP   2 Units at 01/30/22 1110    sennosides-docusate sodium (SENOKOT-S) 8.6-50 MG tablet 2 tablet  2 tablet Oral BID Lashell Hait, APRN - CNP   2 tablet at 01/30/22 0833    glucose (GLUTOSE) 40 % oral gel 15 g  15 g Oral PRN Lashell Hait, APRN - CNP        dextrose 50 % IV solution  12.5 g IntraVENous PRN Lashell Hait, APRN - CNP        glucagon (rDNA) injection 1 mg  1 mg IntraMUSCular PRN Lashell Hait, APRN - CNP        dextrose 5 % solution  100 mL/hr IntraVENous PRN Lashell Hait, APRN - CNP        norepinephrine (LEVOPHED) 16 mg in dextrose 5 % 250 mL infusion  2-100 mcg/min IntraVENous Continuous Tana Lucia MD 15 mL/hr at 01/30/22 1145 16 mcg/min at 01/30/22 1145    vasopressin 20 Units in dextrose 5 % 100 mL infusion  0.01-0.04 Units/min IntraVENous Continuous Tana Lucia MD   Stopped at 01/25/22 0957    sodium chloride flush 0.9 % injection 5-40 mL  5-40 mL IntraVENous 2 times per day Tana Lucia MD   10 mL at 01/29/22 2058    sodium chloride flush 0.9 % injection 5-40 mL  5-40 mL IntraVENous PRN Tana Lucia MD        0.9 % sodium chloride infusion  25 mL IntraVENous PRN Tana Lucia MD        ondansetron (ZOFRAN-ODT) disintegrating tablet 4 mg  4 mg Oral Q8H PRN Tana Lucia MD        Or    [facility] injection 4 mg  4 mg IntraVENous Q6H PRN Tana Lucia MD        polyethylene glycol (GLYCOLAX) packet 17 g  17 g Oral Daily PRN Tana Lucia MD        acetaminophen (TYLENOL) tablet 650 mg  650 mg Oral Q6H PRN Tana Lucia MD        Or    acetaminophen (TYLENOL) suppository 650 mg  650 mg Rectal Q6H PRN Rosa Maria Juan MD        piperacillin-tazobactam (ZOSYN) 2,250 mg in dextrose 5 % 50 mL IVPB (mini-bag)  2,250 mg IntraVENous Q8H Rosa Maria Juan MD 20 mL/hr at 01/30/22 1145 Rate Verify at 01/30/22 1145    chlorhexidine (PERIDEX) 0.12 % solution 15 mL  15 mL Mouth/Throat BID Rosa Maria Juan MD   15 mL at 01/30/22 9539      Problem List     Cardiac arrest [facility]) - Primary         Social History     Tobacco History     Smoking Status  Current Every Day Smoker Smoking Frequency  1 pack/day for 45 years (45 pk yrs)    Smokeless Tobacco Use  Never Used          Alcohol History     Alcohol Use Status  Yes Comment  OCCASIONAL          Drug Use     Drug Use Status  No          Sexual Activity     Sexually Active  Not Asked                  Objective (Physical Exam)   BP (!) 121/52   Pulse 60   Temp 96.3 °F (35.7 °C) (Rectal)   Resp (!) 0   Ht 5' 7\" (1.702 m)   Wt 177 lb 11.1 oz (80.6 kg)   SpO2 97%   BMI 27.83 kg/m²       Neuro:   Examined on propofol and Versed. Pupils were 3 mm and reactive to light. Corneals were absent. No response to central pain or pain [elbow pressure] x4 limbs. Reduced tone x4 in keeping with sedation    [unfilled]    Most recent    EEG No valid procedures specified. MRI of Brain No results found for this or any previous visit. No results found for this or any previous visit. MRA of the Head and Neck: No results found for this or any previous visit. No results found for this or any previous visit. No results found for this or any previous visit. CT of the Head: Results for orders placed during the hospital encounter of 01/24/22    CT HEAD WO CONTRAST    Narrative  EXAMINATION: CT HEAD WO CONTRAST, 1/29/2022 3:39 PM    CLINICAL HISTORY:  repeat, post arrest. Patient cannot get MRI.     COMPARISON: Brain CT from January 24, 2022    TECHNIQUE:  Multiple contiguous axial images of the head were obtained from the skull base through the skull vertex without intravenous contrast. Sagittal and coronal reformats have been produced. All CT scans at this facility use dose modulation, iterative reconstruction, and/or weight based dosing when appropriate to reduce radiation dose to as low as reasonably achievable. BRAIN CT FINDINGS:    Gray-white matter differentiation is maintained. No acute hemorrhage, mass, mass effect, or midline shift. There is prominence of sulci and ventricles indicating mild global cerebral atrophy and chronic involutional changes. .    The subcortical and periventricular white matter is within normal limits. The basal ganglia are within normal limits. There are no acute changes or space-occupying lesions in the posterior fossa. The visualized portions of the orbits are within normal limits. The globes are intact. The imaged portions of the paranasal sinuses are unremarkable. The calvarium is intact. The patient remains intubated. Impression  There is no acute intracranial process. No results found for this or any previous visit. No results found for this or any previous visit. Carotid duplex: No results found for this or any previous visit. No results found for this or any previous visit. No results found for this or any previous visit. Echo No results found for this or any previous visit.         Lab Results   Component Value Date    WBC 21.2 01/30/2022    RBC 2.70 01/30/2022    RBC 4.42 05/21/2012    HGB 8.6 01/30/2022    HCT 26.4 01/30/2022    MCV 98.0 01/30/2022    MCH 32.0 01/30/2022    MCHC 32.6 01/30/2022    RDW 17.3 01/30/2022     01/30/2022    MPV 10.4 02/16/2015     Lab Results   Component Value Date     01/30/2022    K 4.7 01/30/2022    CL 85 01/30/2022    CO2 20 01/30/2022    BUN 59 01/30/2022    CREATININE 5.47 01/30/2022    CREATININE 5.3 01/30/2022    GFRAA 12.4 01/30/2022    LABGLOM 10.2 01/30/2022    GLUCOSE 161 01/30/2022    GLUCOSE 76 05/21/2012    PROT 6.0 01/30/2022    LABALBU 2.9 01/30/2022    LABALBU 4.2 05/21/2012    CALCIUM 8.4 01/30/2022    BILITOT 0.3 01/30/2022    ALKPHOS 91 01/30/2022    AST 17 01/30/2022    ALT 21 01/30/2022     Lab Results   Component Value Date    PROTIME 21.6 01/25/2022    INR 1.9 01/25/2022     Lab Results   Component Value Date    UHRJYUQU69 242 03/12/2013    FOLATE 15.6 03/12/2013    IRON 68 03/12/2013    TIBC 286 03/12/2013     Lab Results   Component Value Date    TRIG 61 02/19/2016    HDL 33 02/19/2016    HDL 31 09/07/2011    LDLCALC 74 02/19/2016     No components found for: A1C  No results found for: LABAMPH, BARBSCNU, LABBENZ, CANNAB, COCAINESCRN, LABMETH, OPIATESCREENURINE, PHENCYCLIDINESCREENURINE, PPXUR, ETOH  No results found for: LITHIUM, DILFRTOT, VALPROATE    Assessment & Plan          CT had reviewed. While he has retained gray-white differentiation, CT is patient sensitive for anoxic brain injury. It is specific, but not sensitive. The lack of normal background rhythm on EEG can be associated with a poor prognosis. He is not brain dead as he has retained pupillary responses. The rest of his brainstem examination is confounded by the sedation. Discussion will be happening between Dr. Marquis Mcigll and the patient's family today.       Critical care time 34-minutes

## 2022-01-30 NOTE — PROGRESS NOTES
Progress Note  Date:2022       Room:[place] [address]  Patient Maia Shelton     YOB: 1945     Age:76 y.o. Subjective    Subjective     Review of Systems  ROS: could not be obtained bc pt is intubated  Objective         Vitals Last 24 Hours:  TEMPERATURE:  Temp  Av.2 °F (35.7 °C)  Min: 96.1 °F (35.6 °C)  Max: 96.3 °F (35.7 °C)  RESPIRATIONS RANGE: Resp  Av.9  Min: 0  Max: 33  PULSE OXIMETRY RANGE: SpO2  Av.6 %  Min: 98 %  Max: 100 %  PULSE RANGE: Pulse  Av.6  Min: 60  Max: 73  BLOOD PRESSURE RANGE: No data recorded.  ; No data recorded. I/O (24Hr): Intake/Output Summary (Last 24 hours) at 2022 1009  Last data filed at 2022 0650  Gross per 24 hour   Intake 3167.26 ml   Output 130 ml   Net 3037.26 ml     Objective:  General Appearance:  Ill-appearing. Vital signs: (most recent): Blood pressure (!) 121/52, pulse 60, temperature 96.3 °F (35.7 °C), temperature source Rectal, resp. rate 25, height 5' 7\" (1.702 m), weight 177 lb 11.1 oz (80.6 kg), SpO2 98 %. HEENT: (+ ET tube, neck is supple)    Heart: S1 normal and S2 normal.    Abdomen: Abdomen is soft. Bowel sounds are normal.   There is no epigastric area or suprapubic area tenderness. Pulses: Distal pulses are intact. Neurological: (Sedated). Skin:  Warm and dry. Labs/Imaging/Diagnostics    Labs:  CBC:  Recent Labs     22  0558 22  0513 22  0846   WBC 16.1*  --  21.2*   RBC 2.64*  --  2.70*   HGB 8.6* 9.4* 8.6*   HCT 25.6*  --  26.4*   MCV 96.8  --  98.0   RDW 17.1*  --  17.3*     --  172     CHEMISTRIES:  Recent Labs     22  0557 22  0513 22  0600   *  --  125*   K 4.2  --  4.7   CL 87*  --  85*   CO2 22  --  20   BUN 42*  --  59*   CREATININE 4.91* 5.3* 5.47*   GLUCOSE 158*  --  161*     PT/INR:  No results for input(s): PROTIME, INR in the last 72 hours. APTT:  No results for input(s): APTT in the last 72 hours.   LIVER PROFILE:  Recent Labs     01/29/22  0557 01/30/22  0600   AST 16 17   ALT 24 21   BILITOT 0.4 0.3   ALKPHOS 94 91       Imaging Last 24 Hours:  CT Head WO Contrast    Result Date: 1/25/2022  EXAMINATION: CT of the brain without contrast HISTORY: Cardiac arrest. Fall. COMPARISON: None available TECHNIQUE: Multiple contiguous axial images were obtained of the brain from the skull base through the vertex. Multiplanar reformats were obtained. FINDINGS: Prominence of the sulci and ventricles compatible with mild generalized parenchymal volume loss. Areas of bilateral supratentorial white matter hypoattenuation are nonspecific but most likely related to chronic small vessel ischemic changes in a patient of this age. Gray-white matter differentiation is preserved. No acute hemorrhage or abnormal extra-axial fluid collection. No mass effect or midline shift. The visualized paranasal sinuses and mastoid air cells are clear. Calvarium is intact. Endotracheal tube and orogastric tube partially visualized. No acute intracranial process. Generalized parenchymal volume loss and nonspecific white matter findings most compatible with chronic small vessel ischemic changes in a patient of this age. All CT scans at this facility use dose modulation, iterative reconstruction, and/or weight based dosing when appropriate to reduce radiation dose to as low as reasonably achievable. CT CERVICAL SPINE WO CONTRAST    Result Date: 1/25/2022  EXAM: CT CERVICAL SPINE WO CONTRAST COMPARISON: None available HISTORY:  Cardiac arrest. Fall. TECHNIQUE: Multiple contiguous axial CT images of the cervical spine were obtained. Multiplanar reformats performed. FINDINGS:  Endotracheal tube is present. A radiopaque foreign body is present along the left lateral aspect of the endotracheal tube at the S2 level. Orogastric tube is present and partially visualized. No acute fracture or malalignment. Atlanto-occipital articulation is maintained. Atlantodental interval is preserved. Cervical spinal vertebral body heights are preserved. Degenerative disc disease at C5-6 and C6-7. Mild multilevel facet arthropathy. Suspect at least mild spinal canal stenosis at C5-6 and C6-7. Varying degrees of osseous neuroforaminal stenosis throughout the cervical spine. There is no prevertebral soft tissue swelling. Areas of septal thickening and small ground glass opacities of both lung apices are nonspecific. Atherosclerotic calcification at the carotid bulbs. No acute fracture or malalignment. 2 cm foreign body along the left aspect of the endotracheal tube at the C2 level. Septal thickening and small groundglass opacities of the lung apices may represent pulmonary edema or may be infectious/inflammatory in etiology. All CT scans at this facility use dose modulation, iterative reconstruction, and/or weight based dosing when appropriate to reduce radiation dose to as low as reasonably achievable. Past Medical History:   Diagnosis Date    BPH (benign prostatic hypertrophy)     CAD (coronary artery disease)     sees Dr. Violetta Saldaña in [facility]    Cardiomyopathy [facility])     CHF (congestive heart failure) ([facility] [address]     LVEF 40% on echo 6/2014    COPD (chronic obstructive pulmonary disease) ([facility] [address]     has O2 rarely uses, has nebulizer    Hypertension     On home oxygen therapy     Type II or unspecified type diabetes mellitus without mention of complication, not stated as uncontrolled        Assessment//Plan           Hospital Problems           Last Modified POA    Cardiac arrest ([facility] [address] 1/24/2022 Yes      r/o seizures  Twitching  vfib  ESRD  COPD  ESRD  PAF  CAD    Assessment & Plan    1/25: Continue with hypothermic protocol follow-up cardiology and nephrology evaluation. Follow-up neurology, start [address] for twitching possible seizures, will get EEG done. Ativan as needed for seizures. Monitor labs.  Spoke with nursing about the care, prognosis is guarded, palliative care  1/26: Patient getting HD today. Follow-up neurology for repeating CT scan and possible evaluation for anoxic brain injury. EP has seen the patient, echo patient has left ventricular EF of 15%. Prognosis guarded. Follow-up potassium level. Follow-up renal for maintenance dialysis and hyperkalemia. 1/27: Patient getting EEG done. Prognosis poor. Continue with current management. Follow-up palliative care. Spoke with nursing about the care Fu neuro about ? Anoxic brain injury  1/28: Patient will get dialysis today. Follow-up EEG report, follow-up MRI, FU neuro, c/w vent support, monitor labs LE duplex was negative for DVT, c/w heparin. 1/29: Follow-up neurology recommendation, family meeting tomorrow at 1:00. Showed nursing. No overnight events continue current management. Prognosis guarded. C/w HD, follow cardiology ,continue with heparin. 1/30: Family meeting today at 1:00. CT head noted. Patient is not brain dead, FU neurology , poor prognosis, as per nursing patient has myoclonus jerks when sedation is off.   Patient not a candidate for extubation,  active electronically signed by Pushpa Naranjo MD on 1/25/22 at 9:21 AM EST

## 2022-01-30 NOTE — FLOWSHEET NOTE
Shift summary: Head CT completed today - family able to zoom to see patient. No neurologic change. Rhiannon Tinsley has signed off on patient. Handoff to Lizett Zarate RN.  Electronically signed by Guy De La Rosa RN on 1/29/2022 at 7:41 PM

## 2022-01-30 NOTE — PROGRESS NOTES
Spiritual Care Services     Summary of Visit:  Accompanied family during a compassionate wean. Provided prayer and grief support. Pt's wife struggling with grief, but well supported by her family. Spiritual Assessment/Intervention/Outcomes:    Encounter Summary  Services provided to[de-identified] Patient and family together  [facility]  Support System: Spouse,Children,Family members  Continue Visiting: Yes  Complexity of Encounter: High  Length of Encounter: 1 hour,45 minutes  Spiritual Assessment Completed: Yes  Routine  Type: Follow up  Assessment: Unable to respond  Intervention: Prayer  Outcome: Did not respond  Crisis  Type: ED Alert (Full Arrest)  Assessment: Approachable,Tearful,Anxious  Intervention: Active listening,Explored feelings, thoughts, concerns,Prayer,Sustaining presence/ Ministry of presence,Discussed illness/injury and it's impact,Discussed meaning/purpose  Outcome: Comfort,Engaged in conversation,Expressed feelings/needs/concerns,Shared life review,Shared reminiscences,Receptive,Less anxious, less agitated  Spiritual/Scientologist  Type: Spiritual struggle  Assessment: Grieving,Tearful  Intervention: Prayer,Grief care,End of life care  Outcome: Grieving,Tearful,Expressed gratitude,Comfort                    Values / Beliefs  Do you have any ethnic, cultural, sacramental, or spiritual Anabaptist needs you would like us to be aware of while you are in the hospital?: No (frida)    Care Plan:    Grief support. Spiritual Care Services   Electronically signed by Sterling Cleveland on 1/30/22 at 3:07 PM EST     To reach a  for emotional and spiritual support, place an [facility] consult request.   If a  is needed immediately, dial 0 and ask to page the on-call .

## 2022-02-07 NOTE — DISCHARGE SUMMARY
Discharge Summary    Date: 2/7/2022  Patient Name: Ekta Madsen YOB: 1945 Age: 68 y.o. Admit Date: 1/24/2022  Discharge Date: 1/30/2022  Discharge Condition:    Admission Diagnosis  Cardiac arrest ([facility] [address] (I46.9); Hyperkalemia (E87.5); Seizure ([facility] [address] (R56.9); Injury of head, initial encounter (S09.90XA); Chronic renal failure syndrome, stage 5 (HCC) (N18.5); Fracture of tooth (traumatic), initial encounter for closed fracture (S02. 5XXA)     Discharge Diagnosis  Active Problems: Cardiac arrest (HCC)Resolved Problems: * No resolved hospital problems. [facility] Stay  Narrative of Hospital Course:  Patient comes with cardiac arrest, intubated and will start on hypothermic protocol. Family decided on compassionate wean since patient was not getting better. Patient had multiple seizure episode like activity while intubated. Patient pronounced dead on 1/30 by my partner. Consultants:  IP CONSULT TO PULMONOLOGYIP CONSULT TO NEPHROLOGYIP CONSULT TO CARDIOLOGYPHARMACY TO DOSE VANCOMYCINIP CONSULT TO NEUROLOGYIP CONSULT TO PALLIATIVE CAREIP CONSULT TO CRITICAL CAREIP CONSULT TO CARDIOLOGYIP CONSULT TO DIETITIAN    Surgeries/procedures Performed:       Treatments:           Discharge Plan/Disposition:  Home    Hospital/Incidental Findings Requiring Follow Up:    Patient Instructions:    Diet:    Activity:  For number of days (if applicable): Other Instructions:    Provider Follow-Up:   No follow-ups on file. Significant Diagnostic Studies:    Recent Labs:  Admission on 01/24/2022, Discharged on 01/30/2022No results displayed because visit has over 200 results. ------------    Radiology last 7 days:  No results found.      Pending Labs Order Current Status Blood Gas, Arterial Collected (01/25/22 0136)  Blood Gas, Arterial Collected (01/25/22 3814)  Blood Gas, Arterial Collected (01/25/22 0807)  Blood Gas, Arterial Collected (01/25/22 0807)  Blood Gas, Arterial Collected (01/25/22 1611)  Blood Gas, Arterial Collected (01/26/22 0518)  Blood Gas, Arterial Collected (01/26/22 0518)  Potassium Collected (01/26/22 0421)  Protime-INR Collected (01/25/22 0145)      Discharge Medications    Discharge Medication List as of 1/30/2022  5:15 PM    Discharge Medication List as of 1/30/2022  5:15 PM    Discharge Medication List as of 1/30/2022  5:15 PMCONTINUE these medications which have NOT CHANGEDallopurinol (ZYLOPRIM) 100 MG tabletTake 100 mg by mouth dailyHistorical Medapixaban (ELIQUIS) 5 MG TABS tabletTake 5 mg by mouth 2 times dailyHistorical Medascorbic acid (VITAMIN C) 1000 MG tabletTake 1,000 mg by mouth dailyHistorical Medvitamin D3 (CHOLECALCIFEROL) 125 MCG (5000 UT) TABS tabletTake 5,000 Units by mouth dailyHistorical Medcolchicine (COLCRYS) 0.6 MG tabletTake 0.6 mg by mouth dailyHistorical Meddigoxin (LANOXIN) 125 MCG tabletTake 0.125 mg by mouth Take tues thurs satHistorical Medferrous sulfate (IRON 325) 325 (65 Fe) MG tabletTake 325 mg by mouth daily (with breakfast)Historical Medfinasteride (PROSCAR) 5 MG tabletHistorical Medpantoprazole (PROTONIX) 40 MG tabletTake 40 mg by mouthHistorical Medsacubitril-valsartan (ENTRESTO) 24-26 MG per tabletTake 1 tablet by mouth 2 times dailyHistorical Medcarvedilol (COREG) 3.125 MG tabletTake 3.125 mg by mouth 2 times daily (with meals)Historical Medaspirin 81 MG EC tabletTake 81 mg by mouth dailyHistorical Medsildenafil (VIAGRA) 100 MG tablettake 1 tablet by mouth as needed for erectile dysfunction, Disp-6 tablet, R-0Normalsimvastatin (ZOCOR) 40 MG tabletTake 1 tablet by mouth every evening, Disp-30 tablet, R-3Normaltamsulosin (FLOMAX) 0.4 MG capsuleTake 1 capsule by mouth daily, Disp-30 capsule, R-3metFORMIN (GLUCOPHAGE) 1000 MG tabletTAKE 1 TABLET TWICE A DAY WITH MEALS, Disp-180 tablet, R-1furosemide (LASIX) 40 MG tabletHistorical MedNITROSTAT 0.4 MG SL tabletHistorical Med!! SPIRIVA HANDIHALER 18 MCG inhalation capsuleHistorical MedVENTOLIN  (90 BASE) MCG/ACT inhalerHistorical Med!! Tiotropium Bromide Monohydrate (Lonell Schilder IN)Inhale  into the lungs. fluticasone-salmeterol (ADVAIR DISKUS) 250-50 MCG/DOSE AEPBInhale 1 puff into the lungs every 12 hours. !! - Potential duplicate medications found. Please discuss with provider. Discharge Medication List as of 1/30/2022  5:15 PMSTOP taking these medicationsguaiFENesin (ROBITUSSIN) 100 MG/5ML syrupComments:Reason for Stopping:ticagrelor (Mitul Stands) 80 MG TABS tabletComments:Reason for Stopping:clotrimazole-betamethasone (Elyn Juan Carlos) creamComments:Reason for Stopping:enalapril (VASOTEC) 20 MG tabletComments:Reason for Stopping:atorvastatin (LIPITOR) 20 MG tabletComments:Reason for Stopping:aspirin 325 MG tabletComments:Reason for Stopping:aliskiren (TEKTURNA) 300 MG tabletComments:Reason for Stopping:    Time Spent on Discharge:E] minutes were spent in patient examination, evaluation, counseling as well as medication reconciliation, prescriptions for required medications, discharge plan, and follow up.     Electronically signed by Natasha Savage MD on 2/7/22 at 12:37 PM EST   overtime on dc summary was 45 min

## 2024-02-22 NOTE — CONSULTS
From: Rowan Mayorga  To: Dr. Ruma Cote  Sent: 2/21/2024 9:19 PM CST  Subject: Donald Donahue has a rash on her private area that has been coming and going. We've been treating it with diaper rash cream, and it will typically go away, but come back a week or so later. She's having trouble getting comfortable at night trying to get to sleep. Its pretty red and irritated. I was planning on keeping her home from school(Thursday) so we could bring her in to get it looked at if that is a possibility. Her brother(David) also needs seen as well for a follow up for an injury he had. Please let me know if we should bring her in!   Palliative Care Consult Note  Patient: Fabby Jean  Gender: male  YOB: 1945  Unit/Bed: IC16/IC16-01  CodeStatus: Full Code  Inpatient Treatment Team: Treatment Team: Attending Provider: Pushpa Naranjo MD; Consulting Physician: Mervat Williamson MD; Consulting Physician: Agustín Delacruz MD; Consulting Physician: Elsa Sneed MD; Consulting Physician: Janie Nayak MD; Consulting Physician: Jacob Thao MD; Utilization Reviewer: Jackie Espino RN; Consulting Physician: Nate Neri MD; : Adilson Peña, RN; Registered Nurse: Jyothi Lemos RN; : Jennifer Ramírez; Patient Care Tech: Amada Lizz; Registered Nurse: Ce Obrien RN; Respiratory Therapist (Day): Lindsay Mcmullen RCP; Registered Nurse: Elina Varghese, MEREDITH  Admit Date:  1/24/2022    Chief Complaint: Goals of Care    History of Presenting Illness:      Fabby Jean is a 68 y.o. male on hospital day 2 with a history of hypertension, COPD, history of tobacco abuse history of end-stage renal disease on daily hemodialysis, patient continues to smoke, history of chronic systolic heart failure status post AICD brought to the ER with cardiac arrest.ROSC was achieved, started having seizures. found to be covid positive. IN ICU, vented, sedated continues seizer activity every 33 seconds despite maximum medical therapy, per neurology:  Examined on propofol and fentanyl. Pupils were 3 mm and reactive to light. No clear corneal response in clinic to sedation. Every 3 seconds the patient had clear myoclonic jerking of the neck eyelids and face lasting about  1 to 2 seconds       EEG and MRI ordered, echo shows EF of 15%. Rewarming has started.                     Review of Systems:       Review of Systems   Unable to perform ROS: Intubated       Physical Examination:       BP (!) 121/52   Pulse 81   Temp 98.6 °F (37 °C)   Resp 22   Ht 5' 7\" (1.702 m)   Wt 177 lb 11.1 oz (80.6 kg)   SpO2 99%   BMI 27.83 kg/m² Physical Exam  Constitutional:       Appearance: He is ill-appearing. Interventions: He is sedated and intubated. HENT:      Head: Normocephalic and atraumatic. Pulmonary:      Effort: He is intubated.          Allergies:      No Known Allergies    Medications:      Current Facility-Administered Medications   Medication Dose Route Frequency Provider Last Rate Last Admin    lidocaine 2 % injection 5 mL  5 mL IntraDERmal Once Rubina Has, APRN - CNP        sodium chloride flush 0.9 % injection 5-40 mL  5-40 mL IntraVENous 2 times per day Rubina Has, APRN - CNP   10 mL at 01/26/22 1130    sodium chloride flush 0.9 % injection 5-40 mL  5-40 mL IntraVENous PRN Rubina Has, APRN - CNP        0.9 % sodium chloride infusion  25 mL IntraVENous PRN Rubina Has, APRN - CNP        0.9 % sodium chloride infusion  250 mL IntraVENous Once Rubina Has, APRN - CNP        propofol injection  5-50 mcg/kg/min IntraVENous Titrated Zoe Paz MD 24.4 mL/hr at 01/26/22 1243 50 mcg/kg/min at 01/26/22 1243    vancomycin (VANCOCIN) 1,250 mg in dextrose 5 % 250 mL IVPB  1,250 mg IntraVENous Once Sandor Valles MD        lacosamide (VIMPAT) 200 mg in dextrose 5 % 70 mL IVPB  200 mg IntraVENous Once Zac Hanna MD        Followed by   Gomez lacosamide (VIMPAT) 100 mg in dextrose 5 % 60 mL IVPB  100 mg IntraVENous BID Zac Hanna MD        midazolam (VERSED) 100 mg in dextrose 5 % 100 mL infusion  1-10 mg/hr IntraVENous Continuous Rubina Has, APRN - CNP        vancomycin (VANCOCIN) intermittent dosing (placeholder)   Other RX Placeholder Sandor Valles MD        fentaNYL (SUBLIMAZE) 1,000 mcg in sodium chloride 0.9 % 100 mL infusion  12.5-200 mcg/hr IntraVENous Continuous Sandor Valles MD 20 mL/hr at 01/26/22 1200 200 mcg/hr at 01/26/22 1200    heparin (porcine) injection 4,000 Units  4,000 Units IntraVENous PRN Sandor Valles MD        heparin (porcine) injection 2,000 Units  2,000 Units IntraVENous PRN Ferny Enriquez MD        heparin (porcine) 25,000 Units in sodium chloride 0.9 % 250 mL infusion  5-30 Units/kg/hr IntraVENous Continuous Ferny Enriquez MD   Stopped at 01/26/22 1018    levETIRAcetam (KEPPRA) 500 mg in sodium chloride 0.9 % 100 mL IVPB  500 mg IntraVENous Daily Tigist Osorio MD   Paused at 01/26/22 0901    LORazepam (ATIVAN) injection 2 mg  2 mg IntraVENous Q6H PRN Tigist Osorio MD   2 mg at 01/26/22 0841    pantoprazole (PROTONIX) injection 40 mg  40 mg IntraVENous Daily Morrie Guvasile, APRN - CNP   40 mg at 01/26/22 0809    And    sodium chloride (PF) 0.9 % injection 10 mL  10 mL IntraVENous Daily Morrie Doc, APRN - CNP   10 mL at 01/26/22 0916    insulin lispro (HUMALOG) injection vial 0-12 Units  0-12 Units SubCUTAneous Q4H Pachecorirebeca Roach, APRN - CNP   2 Units at 01/26/22 0552    sennosides-docusate sodium (SENOKOT-S) 8.6-50 MG tablet 2 tablet  2 tablet Oral BID Pachecorirebeca Roach, APRN - CNP   2 tablet at 01/26/22 0809    glucose (GLUTOSE) 40 % oral gel 15 g  15 g Oral PRN Pachecorie Doc, APRN - CNP        dextrose 50 % IV solution  12.5 g IntraVENous PRN Morrie Gull, APRN - CNP        glucagon (rDNA) injection 1 mg  1 mg IntraMUSCular PRN Pachecorie Doc, APRN - CNP        dextrose 5 % solution  100 mL/hr IntraVENous PRN Pachecorirebeca Roach, APRN - CNP        norepinephrine (LEVOPHED) 16 mg in dextrose 5 % 250 mL infusion  2-100 mcg/min IntraVENous Continuous Ferny Enriquez MD 26.3 mL/hr at 01/26/22 1025 28 mcg/min at 01/26/22 1025    vasopressin 20 Units in dextrose 5 % 100 mL infusion  0.01-0.04 Units/min IntraVENous Continuous Ferny Enriquez MD   Stopped at 01/25/22 0957    sodium chloride flush 0.9 % injection 5-40 mL  5-40 mL IntraVENous 2 times per day Ferny Enriquez MD   10 mL at 01/26/22 0916    sodium chloride flush 0.9 % injection 5-40 mL  5-40 mL IntraVENous PRN Ferny Enriquez MD        0.9 % sodium chloride infusion  25 mL IntraVENous PRN Ferny Enriquez MD        ondansetron (ZOFRAN-ODT) disintegrating tablet 4 mg  4 mg Oral Q8H PRN Apurva Banegas MD        Or    ondansetron [facility]) injection 4 mg  4 mg IntraVENous Q6H PRN Apurva Banegas MD        polyethylene glycol (GLYCOLAX) packet 17 g  17 g Oral Daily PRN Apurva Banegas MD        acetaminophen (TYLENOL) tablet 650 mg  650 mg Oral Q6H PRN Apurva Banegas MD        Or    acetaminophen (TYLENOL) suppository 650 mg  650 mg Rectal Q6H PRN Apurva Banegas MD        piperacillin-tazobactam (ZOSYN) 2,250 mg in dextrose 5 % 50 mL IVPB (mini-bag)  2,250 mg IntraVENous Q8H Apurva Banegas  mL/hr at 01/26/22 1329 2,250 mg at 01/26/22 1329    dexamethasone (DECADRON) injection 6 mg  6 mg IntraVENous Q12H Apurva Banegas MD   6 mg at 01/26/22 1128    chlorhexidine (PERIDEX) 0.12 % solution 15 mL  15 mL Mouth/Throat BID Apurva Banegas MD   15 mL at 01/26/22 0810       History: PMHx:  Past Medical History:   Diagnosis Date    BPH (benign prostatic hypertrophy)     CAD (coronary artery disease)     se[facility]    Cardiomyopathy [facility])     CHF (congestive heart failure) ([facility] [address]     LVEF 40% on echo 6/2014    COPD (chronic obstructive pulmonary disease) ([facility] [address]     has O2 rarely uses, has nebulizer    Hypertension     On home oxygen therapy     Type II or unspecified type diabetes mellitus without mention of complication, not stated as uncontrolled        PSHx:  No past surgical history on file.     Social Hx:  Social History     Socioeconomic History    Marital status:      Spouse name: Not on file    Number of children: Not on file    Years of education: Not on file    Highest education level: Not on file   Occupational History    Not on file   Tobacco Use    Smoking status: Current Every Day Smoker     Packs/day: 1.00     Years: 45.00     Pack years: 45.00    Smokeless tobacco: Never Used   Substance and Sexual Activity    Alcohol use: Yes     Comment: OCCASIONAL    Drug use: No    Sexual activity: Not on file   Other Topics Concern    Not on file   Social History Narrative    Not on file     Social Determinants of Health     Financial Resource Strain:     Difficulty of Paying Living Expenses: Not on file   Food Insecurity:     Worried About Running Out of Food in the Last Year: Not on file    Nayan of Food in the Last Year: Not on file   Transportation Needs:     Lack of Transportation (Medical): Not on file    Lack of Transportation (Non-Medical):  Not on file   Physical Activity:     Days of Exercise per Week: Not on file    Minutes of Exercise per Session: Not on file   Stress:     Feeling of Stress : Not on file   Social Connections:     Frequency of Communication with Friends and Family: Not on file    Frequency of Social Gatherings with Friends and Family: Not on file    Attends [employer] Services: Not on file    Active Member of [address] [place], [place] [address] or Organizations: Not on file    Attends Club or Organization Meetings: Not on file    Marital Status: Not on file   Intimate Partner Violence:     Fear of Current or Ex-Partner: Not on file    Emotionally Abused: Not on file    Physically Abused: Not on file    Sexually Abused: Not on file   Housing Stability:     Unable to Pay for Housing in the Last Year: Not on file    Number of [place] in the Last Year: Not on file    Unstable Housing in the Last Year: Not on file       Family Hx:  Family History   Problem Relation Age of Onset    Lung Cancer Mother     Seizures Other        LABS: Reviewed     CBC:  Lab Results   Component Value Date    WBC 23.7 01/25/2022    RBC 2.98 01/25/2022    RBC 4.42 05/21/2012    HGB 8.5 01/26/2022    HCT 29.4 01/25/2022    MCV 98.7 01/25/2022    MCH 31.1 01/25/2022    MCHC 31.5 01/25/2022    RDW 17.4 01/25/2022     01/25/2022    MPV 10.4 02/16/2015     CBC with Differential:   Lab Results   Component Value Date    WBC 23.7 01/25/2022    RBC 2.98 01/25/2022    RBC 4.42 05/21/2012    HGB 8.5 01/26/2022    HCT 29.4 01/25/2022     01/25/2022    MCV 98.7 01/25/2022    MCH 31.1 01/25/2022    MCHC 31.5 01/25/2022    RDW 17.4 01/25/2022    LYMPHOPCT 2.5 01/25/2022    MONOPCT 3.1 01/25/2022    BASOPCT 0.3 01/25/2022    MONOSABS 0.7 01/25/2022    LYMPHSABS 0.6 01/25/2022    EOSABS 0.0 01/25/2022    BASOSABS 0.1 01/25/2022     CMP:    Lab Results   Component Value Date     01/26/2022    K 5.6 01/26/2022    CL 89 01/26/2022    CO2 23 01/26/2022    BUN 52 01/26/2022    CREATININE 7.03 01/26/2022    CREATININE 7.2 01/26/2022    GFRAA 9.3 01/26/2022    LABGLOM 7.6 01/26/2022    GLUCOSE 128 01/26/2022    GLUCOSE 76 05/21/2012    PROT 6.3 01/24/2022    LABALBU 3.7 01/24/2022    LABALBU 4.2 05/21/2012    CALCIUM 8.4 01/26/2022    BILITOT 0.4 01/24/2022    ALKPHOS 107 01/24/2022    AST 21 01/24/2022    ALT 18 01/24/2022     BMP:    Lab Results   Component Value Date     01/26/2022    K 5.6 01/26/2022    CL 89 01/26/2022    CO2 23 01/26/2022    BUN 52 01/26/2022    LABALBU 3.7 01/24/2022    LABALBU 4.2 05/21/2012    CREATININE 7.03 01/26/2022    CREATININE 7.2 01/26/2022    CALCIUM 8.4 01/26/2022    GFRAA 9.3 01/26/2022    LABGLOM 7.6 01/26/2022    GLUCOSE 128 01/26/2022    GLUCOSE 76 05/21/2012     TSH: No results found for: TSH  Vitamin B12 and Folate: No components found for: FOLIC,  Q64  Urinalysis: No results found for: NITRU, [address], [address], RBCUA, BLOODU, SPECGRAV, GLUCOSEU        FUNCTIONAL ADL´S:     Independent: [  ] Eating, [   ] Dressing, [   ] Transferring, [   ] Jessica Olp, [   ] Bathing, [   ] Continence  Dependent   : [  ] Eating, [   ] Dressing, [   ] Transferring, [   ] Jessica Olp, [   ] Bathing, [   ] Continence  W. assistant : [  ] Eating, [   ] Dressing, [   ] Transferring, [   ] Jessica Olp, [   ] Patrecia Fidel, [   ] Continence    Radiology: Reviewed      Echocardiogram complete 2D with doppler with color    Result Date: 1/25/2022  Transthoracic Echocardiography Report (TTE)  Demographics Patient Name    Ga Reinoso Gender               Male   Patient Number  [id number]       Race                                                   Ethnicity   Visit Number    [id number]      Room Number          IC16   Corporate ID                   Date of Study        01/25/2022   Accession       [id number]     Referring Physician  Stephania Bates MD  Number   Date of Birth   1945     Sonographer          Jeannette Miguel Ángel   Age             68 year(s)     Interpreting         [facility]                                 Physician            Cardiology                                                      Holiday Greg ASHFORD DO  Procedure Type of Study   TTE procedure:ECHO COMPLETE 2D W/DOP W/COLOR. Procedure Date: 01/25/2022 Start: 03:59 PM Study Location: Portable Technical Quality: Adequate visualization Indications:Cardiac arrest. Patient Status: Routine Height: 67 inches Weight: 160 pounds BSA: 1.84 m^2 BMI: 25.06 kg/m^2 BP: 160/90 mmHg  Conclusions   Summary  Left ventricular ejection fraction is visually estimated at 15%. global  hypokinesis. Left ventricular size is severely increased . Right ventricle global systolic function is mildly reduced . The left atrium is Mildly dilated. Trace (+) mitral regurgitation is present. No evidence of aortic valve regurgitation . No evidence of aortic valve stenosis. Signature   ----------------------------------------------------------------  Electronically signed by Jessica Paige DO(Interpreting  physician) on 01/25/2022 05:52 PM  ----------------------------------------------------------------   Findings  Left Ventricle Left ventricular ejection fraction is visually estimated at 15%. Left ventricular size is severely increased . Right Ventricle Normal size right ventricle cavity. Right ventricle global systolic function is mildly reduced . Left Atrium The left atrium is Mildly dilated. Right Atrium Normal right atrium.  Mitral Valve Diffusely thickened and pliable mitral valve leaflets with normal excursion. Trace (+) mitral regurgitation is present. No evidence of mitral valve stenosis. Tricuspid Valve Normal tricuspid valve structure and function. Aortic Valve No evidence of aortic valve regurgitation . No evidence of aortic valve stenosis. Pulmonic Valve Normal pulmonic valve structure and function. Pericardial Effusion No evidence of pericardial effusion. Pleural Effusion No evidence of pleural effusion. Aorta \ Miscellaneous normal findings were found. M-Mode Measurements (cm)   LVIDd: 4.91 cm                                    LVIDs: 4.63 cm  IVSd: 1.58 cm                                     IVSs: 1.38 cm  LVPWd: 1.21 cm                                    LVPWs: 1.16 cm  Rt. Vent. Dimension: 2.55 cm  Doppler Measurements:    MV Peak E-Wave: 0.35 m/s   MV Peak A-Wave: 1.08 m/s  Valves  Mitral Valve   Peak E-Wave: 0.35 m/s                 Peak A-Wave: 1.08 m/s                                        E/A Ratio: 0.33                                        Peak Gradient: 0.5 mmHg                                        Deceleration Time: 178.4 msec   Tissue Doppler   E' Septal Velocity: 0.03 m/s  E' Lateral Velocity: 0.03 m/s  Structures  Left Ventricle   Diastolic Dimension: 2.28 cm          Systolic Dimension: 2.42 cm  Septum Diastolic: 2.62 cm             Septum Systolic: 0.60 cm  PW Diastolic: 7.70 cm                 PW Systolic: 2.21 cm                                        FS: 5.7 %  LV EDV/LV EDV Index: 113.44 ml/62 m^2 LV ESV/LV ESV Index: 99.02 ml/54 m^2  EF Calculated: 12.7 %                 LV Length: 9.19 cm   Right Ventricle   Diastolic Dimension: 2.16 cm      CT Head WO Contrast    Result Date: 1/25/2022  EXAMINATION: CT of the brain without contrast HISTORY: Cardiac arrest. Fall. COMPARISON: None available TECHNIQUE: Multiple contiguous axial images were obtained of the brain from the skull base through the vertex.  Multiplanar reformats were obtained. FINDINGS: Prominence of the sulci and ventricles compatible with mild generalized parenchymal volume loss. Areas of bilateral supratentorial white matter hypoattenuation are nonspecific but most likely related to chronic small vessel ischemic changes in a patient of this age. Gray-white matter differentiation is preserved. No acute hemorrhage or abnormal extra-axial fluid collection. No mass effect or midline shift. The visualized paranasal sinuses and mastoid air cells are clear. Calvarium is intact. Endotracheal tube and orogastric tube partially visualized. No acute intracranial process. Generalized parenchymal volume loss and nonspecific white matter findings most compatible with chronic small vessel ischemic changes in a patient of this age. All CT scans at this facility use dose modulation, iterative reconstruction, and/or weight based dosing when appropriate to reduce radiation dose to as low as reasonably achievable. CT CERVICAL SPINE WO CONTRAST    Result Date: 1/25/2022  EXAM: CT CERVICAL SPINE WO CONTRAST COMPARISON: None available HISTORY:  Cardiac arrest. Fall. TECHNIQUE: Multiple contiguous axial CT images of the cervical spine were obtained. Multiplanar reformats performed. FINDINGS:  Endotracheal tube is present. A radiopaque foreign body is present along the left lateral aspect of the endotracheal tube at the S2 level. Orogastric tube is present and partially visualized. No acute fracture or malalignment. Atlanto-occipital articulation is maintained. Atlantodental interval is preserved. Cervical spinal vertebral body heights are preserved. Degenerative disc disease at C5-6 and C6-7. Mild multilevel facet arthropathy. Suspect at least mild spinal canal stenosis at C5-6 and C6-7. Varying degrees of osseous neuroforaminal stenosis throughout the cervical spine. There is no prevertebral soft tissue swelling.  Areas of septal thickening and small ground glass opacities of both lung apices are nonspecific. Atherosclerotic calcification at the carotid bulbs. No acute fracture or malalignment. 2 cm foreign body along the left aspect of the endotracheal tube at the C2 level. Septal thickening and small groundglass opacities of the lung apices may represent pulmonary edema or may be infectious/inflammatory in etiology. All CT scans at this facility use dose modulation, iterative reconstruction, and/or weight based dosing when appropriate to reduce radiation dose to as low as reasonably achievable. XR CHEST PORTABLE    Result Date: 1/25/2022  Exam: XR CHEST PORTABLE History:  intubation Technique: AP portable view of the chest obtained. Comparison: none Chest x-ray portable Findings: The patient is intubated  with the tip of the endotracheal tube at the thoracic inlet. There is a  AICD device in place The cardiomediastinal silhouette is within normal limits. There is no pneumothorax. There are mild increased markings throughout both lungs. There is minimal blunting of the left costophrenic recess consistent with a small left pleural effusion. . Bones of the thorax appear intact. There are small left pleural effusion. Status post intubation. There are no consolidations or infiltrates. XR CHEST PORTABLE    Result Date: 1/25/2022  Exam: XR CHEST PORTABLE History:  central line Technique: AP portable view of the chest obtained. Comparison: none Chest x-ray portable Findings: The patient is intubated, there is an NG tube coursing towards the stomach    There is a   central line with the tip projecting in the region of the superior vena cava. There is a  AICD device in place The cardiomediastinal silhouette is within normal limits. There are mild increased markings throughout both lungs. There are no large effusions or consolidations. Bones of the thorax appear intact. There are mild increased pulmonary markings in both lungs which may be bilateral infiltrates, COVID-19 pneumonia. US DUP LOWER EXTREMITIES BILATERAL VENOUS    Result Date: 1/25/2022  Venous duplex ultrasound of the right and left lower extremity HISTORY:  DVT COMPARISON:  None available TECHNIQUE: Merlin Dyke scale, duplex Doppler, with compression and augmentation sonography of the deep venous system of the right and left  lower extremity was performed by a registered sonographer and the images were submitted for interpretation. FINDINGS:  The common femoral, femoral, and popliteal veins demonstrate normal color flow, augmentation, and compressibility. No venous duplex ultrasound evidence of DVT in the right or left lower extremity. No sonographic evidence of deep venous thrombosis within the  right or left  lower extremity. Assessment and plan:      -Advance Care Planning  Discussed goals of care with both his daughter ad his wife, Explained in extensive detail nuances between full code, DNR CCA and DNR CC. Remains Full Code      -Goals of Care Discussion:   Long discussion with multiple family members. Disease process and goals of treatment were discussed in basic terms. Family  goal is to optimize available comfort to allow him time to recover. Though his daughter knows her father would not want to be kept alive if no meaningful brain function can be recovered. We discussed that anoxic brain injury is multifactorial, he had downtime of 8 minutes, advanced COPD, and seizures. Discussed current EF of 15% and how that could  Impact his health in the future. Made aware his prognosis is poor. Family would like results of EEG  Prior to changing code status and treatment plan. We discussed the palliative care philosophy in light of those goals. We discussed all care options contingent on treatment response and QOL. Much active listening, presence, and emotional support were given.          Eligible for hospice due to anoxic brain injury, CHF and post full arrest with EF of 15, advanced COPD, inability to maintain his own airway, hydration, or nutrition,decline in functional status,   ( PPS 10).         While hospitalized he is being treated for:  Seizures  Vfib  ESRD  COPD  PAF  CAD      Electronically signed by ESTHER Catalan CNP on 1/26/2022 at 2:07 PM

## 2025-06-26 NOTE — PROGRESS NOTES
Progress Note  Date:2022       Room:[place] [address]  Patient Tanya Arango     YOB: 1945     Age:76 y.o. Subjective    Subjective   Review of SystemsROS: could not be obtained bc pt is intubated  Objective         Vitals Last 24 Hours:  TEMPERATURE:  Temp  Av.3 °F (34.6 °C)  Min: 90 °F (32.2 °C)  Max: 96.6 °F (35.9 °C)  RESPIRATIONS RANGE: Resp  Av.6  Min: 0  Max: 31  PULSE OXIMETRY RANGE: SpO2  Av.4 %  Min: 58 %  Max: 100 %  PULSE RANGE: Pulse  Av.2  Min: 60  Max: 125  BLOOD PRESSURE RANGE: Systolic (93HUT), DYI:726 , Min:74 , XVE:557   ; Diastolic (30RYM), QWT:51, Min:29, Max:137    I/O (24Hr): Intake/Output Summary (Last 24 hours) at 2022 0921  Last data filed at 2022 9068  Gross per 24 hour   Intake 2279.65 ml   Output 1005 ml   Net 1274.65 ml     Objective:  General Appearance:  Ill-appearing. Vital signs: (most recent): Blood pressure (!) 145/63, pulse 66, temperature 96.6 °F (35.9 °C), temperature source Bladder, resp. rate 20, height 5' 7\" (1.702 m), weight 160 lb (72.6 kg), SpO2 100 %. HEENT: (+ ET tube, neck is supple)    Heart: S1 normal and S2 normal.    Abdomen: Abdomen is soft. Bowel sounds are normal.   There is no epigastric area or suprapubic area tenderness. Pulses: Distal pulses are intact. Neurological: (Sedated). Skin:  Warm and dry. Labs/Imaging/Diagnostics    Labs:  CBC:  Recent Labs     22  1800 22  1913 22  0109 22  0648 22  0820   WBC 13.9*  --   --  23.7*  --    RBC 2.98*  --   --  2.98*  --    HGB 9.4*   < > 10.1* 9.3* 9.7*   HCT 30.7*  --   --  29.4*  --    .8*  --   --  98.7  --    RDW 17.5*  --   --  17.4*  --      --   --  173  --     < > = values in this interval not displayed.      CHEMISTRIES:  Recent Labs     22  1800 22  1913 22  0116 22  0734 22  0820   *  --  135 131*  --    K 6.6*  --  4.5 5.6*  --    CL 94*  --  93* 90*  --    CO2 22  --  28 24  --    BUN 55*  --  32* 39*  --    CREATININE 8.62*   < > 5.01* 6.04* 6.3*   GLUCOSE 151*  --  139* 188*  --    PHOS  --   --  4.5 4.9*  --    MG 3.4*  --  2.4 2.2  --     < > = values in this interval not displayed. PT/INR:  Recent Labs     01/24/22  1800 01/25/22  0100 01/25/22  0647   PROTIME 17.9* 16.9* 21.6*   INR 1.5 1.4 1.9     APTT:  Recent Labs     01/25/22  0100   APTT 45.1*     LIVER PROFILE:  Recent Labs     01/24/22  1800   AST 21   ALT 18   BILITOT 0.4   ALKPHOS 107*       Imaging Last 24 Hours:  CT Head WO Contrast    Result Date: 1/25/2022  EXAMINATION: CT of the brain without contrast HISTORY: Cardiac arrest. Fall. COMPARISON: None available TECHNIQUE: Multiple contiguous axial images were obtained of the brain from the skull base through the vertex. Multiplanar reformats were obtained. FINDINGS: Prominence of the sulci and ventricles compatible with mild generalized parenchymal volume loss. Areas of bilateral supratentorial white matter hypoattenuation are nonspecific but most likely related to chronic small vessel ischemic changes in a patient of this age. Gray-white matter differentiation is preserved. No acute hemorrhage or abnormal extra-axial fluid collection. No mass effect or midline shift. The visualized paranasal sinuses and mastoid air cells are clear. Calvarium is intact. Endotracheal tube and orogastric tube partially visualized. No acute intracranial process. Generalized parenchymal volume loss and nonspecific white matter findings most compatible with chronic small vessel ischemic changes in a patient of this age. All CT scans at this facility use dose modulation, iterative reconstruction, and/or weight based dosing when appropriate to reduce radiation dose to as low as reasonably achievable. CT CERVICAL SPINE WO CONTRAST    Result Date: 1/25/2022  EXAM: CT CERVICAL SPINE WO CONTRAST COMPARISON: None available HISTORY:  Cardiac arrest. Fall. TECHNIQUE: Multiple contiguous axial CT images of the cervical spine were obtained. Multiplanar reformats performed. FINDINGS:  Endotracheal tube is present. A radiopaque foreign body is present along the left lateral aspect of the endotracheal tube at the S2 level. Orogastric tube is present and partially visualized. No acute fracture or malalignment. Atlanto-occipital articulation is maintained. Atlantodental interval is preserved. Cervical spinal vertebral body heights are preserved. Degenerative disc disease at C5-6 and C6-7. Mild multilevel facet arthropathy. Suspect at least mild spinal canal stenosis at C5-6 and C6-7. Varying degrees of osseous neuroforaminal stenosis throughout the cervical spine. There is no prevertebral soft tissue swelling. Areas of septal thickening and small ground glass opacities of both lung apices are nonspecific. Atherosclerotic calcification at the carotid bulbs. No acute fracture or malalignment. 2 cm foreign body along the left aspect of the endotracheal tube at the C2 level. Septal thickening and small groundglass opacities of the lung apices may represent pulmonary edema or may be infectious/inflammatory in etiology. All CT scans at this facility use dose modulation, iterative reconstruction, and/or weight based dosing when appropriate to reduce radiation dose to as low as reasonably achievable.        Past Medical History:   Diagnosis Date    BPH (benign prostatic hypertrophy)     CAD (coronary artery disease)     sees Dr. Kateryna King in [facility]    Cardiomyopathy [facility])     CHF (congestive heart failure) ([facility]     LVEF 40% on echo 6/2014    COPD (chronic obstructive pulmonary disease) ([facility]     has O2 rarely uses, has nebulizer    Hypertension     On home oxygen therapy     Type II or unspecified type diabetes mellitus without mention of complication, not stated as uncontrolled        Assessment//Plan           Hospital Problems           Last Modified POA Cardiac arrest ([facility] [address] 1/24/2022 Yes      r/o seizures  Twitching  vfib  ESRD  COPD  MINAL    Assessment & Plan  1/25: Continue with hypothermic protocol follow-up cardiology and nephrology evaluation. Follow-up neurology, start [address] for twitching possible seizures, will get EEG done. Ativan as needed for seizures. Monitor labs.  Spoke with nursing about the care, prognosis is guarded, palliative care  Electronically signed by Katiuska Johansen MD on 1/25/22 at 9:21 AM EST no